# Patient Record
Sex: FEMALE | Race: WHITE | NOT HISPANIC OR LATINO | URBAN - METROPOLITAN AREA
[De-identification: names, ages, dates, MRNs, and addresses within clinical notes are randomized per-mention and may not be internally consistent; named-entity substitution may affect disease eponyms.]

---

## 2018-02-09 ENCOUNTER — CONVERSION ENCOUNTER (OUTPATIENT)
Dept: MAMMOGRAPHY | Facility: HOSPITAL | Age: 63
End: 2018-02-09

## 2018-02-13 ENCOUNTER — CONVERSION ENCOUNTER (OUTPATIENT)
Dept: ULTRASOUND IMAGING | Facility: HOSPITAL | Age: 63
End: 2018-02-13

## 2018-02-22 ENCOUNTER — OFFICE VISIT CONVERTED (OUTPATIENT)
Dept: ONCOLOGY | Facility: HOSPITAL | Age: 63
End: 2018-02-22
Attending: INTERNAL MEDICINE

## 2018-02-22 ENCOUNTER — OFFICE VISIT CONVERTED (OUTPATIENT)
Dept: OTHER | Facility: HOSPITAL | Age: 63
End: 2018-02-22
Attending: SURGERY

## 2018-03-09 ENCOUNTER — OFFICE VISIT CONVERTED (OUTPATIENT)
Dept: ONCOLOGY | Facility: HOSPITAL | Age: 63
End: 2018-03-09
Attending: INTERNAL MEDICINE

## 2018-03-12 ENCOUNTER — OFFICE VISIT CONVERTED (OUTPATIENT)
Dept: UROLOGY | Facility: CLINIC | Age: 63
End: 2018-03-12
Attending: UROLOGY

## 2018-03-12 ENCOUNTER — CONVERSION ENCOUNTER (OUTPATIENT)
Dept: SURGERY | Facility: CLINIC | Age: 63
End: 2018-03-12

## 2018-03-14 ENCOUNTER — OFFICE VISIT CONVERTED (OUTPATIENT)
Dept: ONCOLOGY | Facility: HOSPITAL | Age: 63
End: 2018-03-14
Attending: INTERNAL MEDICINE

## 2018-03-19 ENCOUNTER — OFFICE VISIT CONVERTED (OUTPATIENT)
Dept: SURGERY | Facility: CLINIC | Age: 63
End: 2018-03-19
Attending: SURGERY

## 2018-03-20 ENCOUNTER — OFFICE VISIT CONVERTED (OUTPATIENT)
Dept: ONCOLOGY | Facility: HOSPITAL | Age: 63
End: 2018-03-20
Attending: INTERNAL MEDICINE

## 2018-03-26 ENCOUNTER — TELEPHONE CONVERTED (OUTPATIENT)
Dept: ONCOLOGY | Facility: HOSPITAL | Age: 63
End: 2018-03-26

## 2018-04-03 ENCOUNTER — OFFICE VISIT CONVERTED (OUTPATIENT)
Dept: ONCOLOGY | Facility: HOSPITAL | Age: 63
End: 2018-04-03
Attending: INTERNAL MEDICINE

## 2018-04-10 ENCOUNTER — OFFICE VISIT CONVERTED (OUTPATIENT)
Dept: ONCOLOGY | Facility: HOSPITAL | Age: 63
End: 2018-04-10
Attending: NURSE PRACTITIONER

## 2018-04-13 ENCOUNTER — TELEPHONE CONVERTED (OUTPATIENT)
Dept: ONCOLOGY | Facility: HOSPITAL | Age: 63
End: 2018-04-13

## 2018-04-16 ENCOUNTER — CONVERSION ENCOUNTER (OUTPATIENT)
Dept: SURGERY | Facility: CLINIC | Age: 63
End: 2018-04-16

## 2018-04-16 ENCOUNTER — OFFICE VISIT CONVERTED (OUTPATIENT)
Dept: UROLOGY | Facility: CLINIC | Age: 63
End: 2018-04-16
Attending: UROLOGY

## 2018-04-19 ENCOUNTER — TELEPHONE CONVERTED (OUTPATIENT)
Dept: ONCOLOGY | Facility: HOSPITAL | Age: 63
End: 2018-04-19

## 2018-04-24 ENCOUNTER — OFFICE VISIT CONVERTED (OUTPATIENT)
Dept: ONCOLOGY | Facility: HOSPITAL | Age: 63
End: 2018-04-24
Attending: INTERNAL MEDICINE

## 2018-05-01 ENCOUNTER — OFFICE VISIT CONVERTED (OUTPATIENT)
Dept: ONCOLOGY | Facility: HOSPITAL | Age: 63
End: 2018-05-01
Attending: INTERNAL MEDICINE

## 2018-05-15 ENCOUNTER — OFFICE VISIT CONVERTED (OUTPATIENT)
Dept: ONCOLOGY | Facility: HOSPITAL | Age: 63
End: 2018-05-15
Attending: NURSE PRACTITIONER

## 2018-05-29 ENCOUNTER — TELEPHONE CONVERTED (OUTPATIENT)
Dept: ONCOLOGY | Facility: HOSPITAL | Age: 63
End: 2018-05-29

## 2018-06-07 ENCOUNTER — OFFICE VISIT CONVERTED (OUTPATIENT)
Dept: SURGERY | Facility: CLINIC | Age: 63
End: 2018-06-07
Attending: SURGERY

## 2018-06-22 ENCOUNTER — TELEPHONE CONVERTED (OUTPATIENT)
Dept: ONCOLOGY | Facility: HOSPITAL | Age: 63
End: 2018-06-22

## 2018-07-17 ENCOUNTER — CONVERSION ENCOUNTER (OUTPATIENT)
Dept: PERIOP | Facility: HOSPITAL | Age: 63
End: 2018-07-17

## 2018-07-17 ENCOUNTER — PROCEDURE VISIT CONVERTED (OUTPATIENT)
Dept: OTHER | Facility: HOSPITAL | Age: 63
End: 2018-07-17
Attending: SURGERY

## 2018-07-23 ENCOUNTER — OFFICE VISIT CONVERTED (OUTPATIENT)
Dept: UROLOGY | Facility: CLINIC | Age: 63
End: 2018-07-23
Attending: UROLOGY

## 2018-07-30 ENCOUNTER — OFFICE VISIT CONVERTED (OUTPATIENT)
Dept: SURGERY | Facility: CLINIC | Age: 63
End: 2018-07-30
Attending: SURGERY

## 2018-08-01 ENCOUNTER — OFFICE VISIT CONVERTED (OUTPATIENT)
Dept: ONCOLOGY | Facility: HOSPITAL | Age: 63
End: 2018-08-01
Attending: INTERNAL MEDICINE

## 2018-08-13 ENCOUNTER — OFFICE VISIT CONVERTED (OUTPATIENT)
Dept: SURGERY | Facility: CLINIC | Age: 63
End: 2018-08-13
Attending: SURGERY

## 2018-09-11 ENCOUNTER — OFFICE VISIT CONVERTED (OUTPATIENT)
Dept: ORTHOPEDIC SURGERY | Facility: CLINIC | Age: 63
End: 2018-09-11
Attending: PHYSICIAN ASSISTANT

## 2018-09-13 ENCOUNTER — OFFICE VISIT CONVERTED (OUTPATIENT)
Dept: ONCOLOGY | Facility: HOSPITAL | Age: 63
End: 2018-09-13
Attending: NURSE PRACTITIONER

## 2018-10-02 ENCOUNTER — OFFICE VISIT CONVERTED (OUTPATIENT)
Dept: ORTHOPEDIC SURGERY | Facility: CLINIC | Age: 63
End: 2018-10-02
Attending: PHYSICIAN ASSISTANT

## 2018-10-02 ENCOUNTER — CONVERSION ENCOUNTER (OUTPATIENT)
Dept: ORTHOPEDIC SURGERY | Facility: CLINIC | Age: 63
End: 2018-10-02

## 2018-10-18 ENCOUNTER — OFFICE VISIT CONVERTED (OUTPATIENT)
Dept: ONCOLOGY | Facility: HOSPITAL | Age: 63
End: 2018-10-18
Attending: INTERNAL MEDICINE

## 2018-10-23 ENCOUNTER — OFFICE VISIT CONVERTED (OUTPATIENT)
Dept: ORTHOPEDIC SURGERY | Facility: CLINIC | Age: 63
End: 2018-10-23
Attending: PHYSICIAN ASSISTANT

## 2018-11-07 ENCOUNTER — CONVERSION ENCOUNTER (OUTPATIENT)
Dept: MAMMOGRAPHY | Facility: HOSPITAL | Age: 63
End: 2018-11-07

## 2018-11-21 ENCOUNTER — OFFICE VISIT CONVERTED (OUTPATIENT)
Dept: ONCOLOGY | Facility: HOSPITAL | Age: 63
End: 2018-11-21
Attending: NURSE PRACTITIONER

## 2019-01-03 ENCOUNTER — HOSPITAL ENCOUNTER (OUTPATIENT)
Dept: OTHER | Facility: HOSPITAL | Age: 64
Discharge: HOME | End: 2019-01-03
Attending: INTERNAL MEDICINE

## 2019-01-03 ENCOUNTER — OFFICE VISIT CONVERTED (OUTPATIENT)
Dept: ONCOLOGY | Facility: HOSPITAL | Age: 64
End: 2019-01-03
Attending: INTERNAL MEDICINE

## 2019-01-03 LAB
BASOPHILS # BLD AUTO: 0.01 10*3/UL (ref 0–0.2)
BASOPHILS NFR BLD AUTO: 0.11 % (ref 0–3)
EOSINOPHIL # BLD AUTO: 0.18 10*3/UL (ref 0–0.7)
EOSINOPHIL # BLD AUTO: 2.66 % (ref 0–7)
ERYTHROCYTE [DISTWIDTH] IN BLOOD BY AUTOMATED COUNT: 14.5 % (ref 11.5–14.5)
FERRITIN SERPL-MCNC: 13 NG/ML (ref 10–200)
HBA1C MFR BLD: 11.9 G/DL (ref 12–16)
HCT VFR BLD AUTO: 36.8 % (ref 37–47)
IRON SATN MFR SERPL: 11 % (ref 20–55)
IRON SERPL-MCNC: 46 UG/DL (ref 60–170)
LYMPHOCYTES # BLD AUTO: 0.84 10*3/UL (ref 1–5)
MCH RBC QN AUTO: 26.8 PG (ref 27–31)
MCHC RBC AUTO-ENTMCNC: 32.3 G/DL (ref 33–37)
MCV RBC AUTO: 82.8 FL (ref 81–99)
MONOCYTES # BLD AUTO: 0.72 10*3/UL (ref 0.2–1.2)
MONOCYTES NFR BLD AUTO: 10.6 % (ref 3–10)
NEUTROPHILS # BLD AUTO: 5.01 10*3/UL (ref 2–8)
NEUTROPHILS NFR BLD AUTO: 74.2 % (ref 30–85)
NRBC BLD AUTO-RTO: 0 % (ref 0–0.01)
PLATELET # BLD AUTO: 268 10*3/UL (ref 130–400)
PMV BLD AUTO: 8 FL (ref 7.4–10.4)
RBC # BLD AUTO: 4.45 10*6/UL (ref 4.2–5.4)
TIBC SERPL-MCNC: 416 UG/DL (ref 245–450)
TRANSFERRIN SERPL-MCNC: 291 MG/DL (ref 250–380)
VARIANT LYMPHS NFR BLD MANUAL: 12.5 % (ref 20–45)
WBC # BLD AUTO: 6.75 10*3/UL (ref 4.8–10.8)

## 2019-01-11 ENCOUNTER — HOSPITAL ENCOUNTER (OUTPATIENT)
Dept: RADIATION ONCOLOGY | Facility: HOSPITAL | Age: 64
Discharge: HOME | End: 2019-01-11
Attending: RADIOLOGY

## 2019-01-15 ENCOUNTER — HOSPITAL ENCOUNTER (OUTPATIENT)
Dept: OTHER | Facility: HOSPITAL | Age: 64
Setting detail: RECURRING SERIES
Discharge: HOME | End: 2019-01-31
Attending: INTERNAL MEDICINE

## 2019-02-13 ENCOUNTER — HOSPITAL ENCOUNTER (OUTPATIENT)
Dept: OTHER | Facility: HOSPITAL | Age: 64
Discharge: HOME | End: 2019-02-13
Attending: NURSE PRACTITIONER

## 2019-02-13 ENCOUNTER — OFFICE VISIT CONVERTED (OUTPATIENT)
Dept: ONCOLOGY | Facility: HOSPITAL | Age: 64
End: 2019-02-13
Attending: NURSE PRACTITIONER

## 2019-02-13 LAB
ALBUMIN SERPL-MCNC: 3.8 G/DL (ref 3.5–5)
ALBUMIN/GLOB SERPL: 1.4 {RATIO} (ref 1.4–2.6)
ALP SERPL-CCNC: 60 U/L (ref 43–160)
ALT SERPL-CCNC: 16 U/L (ref 10–40)
ANION GAP SERPL CALC-SCNC: 15 MMOL/L (ref 8–19)
AST SERPL-CCNC: 16 U/L (ref 15–50)
BASOPHILS # BLD AUTO: 0 10*3/UL (ref 0–0.2)
BASOPHILS NFR BLD AUTO: 0.03 % (ref 0–3)
BILIRUB SERPL-MCNC: <0.15 MG/DL (ref 0.2–1.3)
BUN SERPL-MCNC: 17 MG/DL (ref 5–25)
BUN/CREAT SERPL: 20 {RATIO} (ref 6–20)
CALCIUM SERPL-MCNC: 10.8 MG/DL (ref 8.7–10.4)
CHLORIDE SERPL-SCNC: 102 MMOL/L (ref 99–111)
CONV CO2: 26 MMOL/L (ref 22–32)
CONV TOTAL PROTEIN: 6.5 G/DL (ref 6.3–8.2)
CREAT UR-MCNC: 0.87 MG/DL (ref 0.5–0.9)
EOSINOPHIL # BLD AUTO: 0.14 10*3/UL (ref 0–0.7)
EOSINOPHIL # BLD AUTO: 2.26 % (ref 0–7)
ERYTHROCYTE [DISTWIDTH] IN BLOOD BY AUTOMATED COUNT: 15.1 % (ref 11.5–14.5)
EST. AVERAGE GLUCOSE BLD GHB EST-MCNC: 157 MG/DL
FERRITIN SERPL-MCNC: 274 NG/ML (ref 10–200)
GFR SERPLBLD BASED ON 1.73 SQ M-ARVRAT: >60 ML/MIN/{1.73_M2}
GLOBULIN UR ELPH-MCNC: 2.7 G/DL (ref 2–3.5)
GLUCOSE SERPL-MCNC: 119 MG/DL (ref 65–99)
HBA1C MFR BLD: 12.3 G/DL (ref 12–16)
HBA1C MFR BLD: 7.1 % (ref 3.5–5.7)
HCT VFR BLD AUTO: 36.1 % (ref 37–47)
IRON SERPL-MCNC: 81 UG/DL (ref 60–170)
LYMPHOCYTES # BLD AUTO: 0.86 10*3/UL (ref 1–5)
MCH RBC QN AUTO: 28.6 PG (ref 27–31)
MCHC RBC AUTO-ENTMCNC: 34.1 G/DL (ref 33–37)
MCV RBC AUTO: 84 FL (ref 81–99)
MONOCYTES # BLD AUTO: 0.53 10*3/UL (ref 0.2–1.2)
MONOCYTES NFR BLD AUTO: 8.46 % (ref 3–10)
NEUTROPHILS # BLD AUTO: 4.69 10*3/UL (ref 2–8)
NEUTROPHILS NFR BLD AUTO: 75.4 % (ref 30–85)
NRBC BLD AUTO-RTO: 0 % (ref 0–0.01)
OSMOLALITY SERPL CALC.SUM OF ELEC: 289 MOSM/KG (ref 273–304)
PLATELET # BLD AUTO: 265 10*3/UL (ref 130–400)
PMV BLD AUTO: 7.7 FL (ref 7.4–10.4)
POTASSIUM SERPL-SCNC: 4.5 MMOL/L (ref 3.5–5.3)
RBC # BLD AUTO: 4.3 10*6/UL (ref 4.2–5.4)
SODIUM SERPL-SCNC: 138 MMOL/L (ref 135–147)
VARIANT LYMPHS NFR BLD MANUAL: 13.9 % (ref 20–45)
WBC # BLD AUTO: 6.22 10*3/UL (ref 4.8–10.8)

## 2019-03-12 ENCOUNTER — OFFICE VISIT CONVERTED (OUTPATIENT)
Dept: ONCOLOGY | Facility: HOSPITAL | Age: 64
End: 2019-03-12
Attending: INTERNAL MEDICINE

## 2019-03-12 ENCOUNTER — HOSPITAL ENCOUNTER (OUTPATIENT)
Dept: OTHER | Facility: HOSPITAL | Age: 64
Discharge: HOME | End: 2019-03-12
Attending: INTERNAL MEDICINE

## 2019-03-12 LAB
ALBUMIN SERPL-MCNC: 4.1 G/DL (ref 3.5–5)
ALBUMIN/GLOB SERPL: 1.7 {RATIO} (ref 1.4–2.6)
ALP SERPL-CCNC: 53 U/L (ref 43–160)
ALT SERPL-CCNC: 15 U/L (ref 10–40)
ANION GAP SERPL CALC-SCNC: 12 MMOL/L (ref 8–19)
AST SERPL-CCNC: 15 U/L (ref 15–50)
BASOPHILS # BLD AUTO: 0.03 10*3/UL (ref 0–0.2)
BASOPHILS NFR BLD AUTO: 0.5 % (ref 0–3)
BILIRUB SERPL-MCNC: <0.15 MG/DL (ref 0.2–1.3)
BUN SERPL-MCNC: 16 MG/DL (ref 5–25)
BUN/CREAT SERPL: 18 {RATIO} (ref 6–20)
CALCIUM SERPL-MCNC: 9.8 MG/DL (ref 8.7–10.4)
CHLORIDE SERPL-SCNC: 103 MMOL/L (ref 99–111)
CONV ABS IMM GRAN: 0.03 10*3/UL (ref 0–0.2)
CONV CO2: 26 MMOL/L (ref 22–32)
CONV IMMATURE GRAN: 0.5 % (ref 0–1.8)
CONV TOTAL PROTEIN: 6.5 G/DL (ref 6.3–8.2)
CREAT UR-MCNC: 0.88 MG/DL (ref 0.5–0.9)
DEPRECATED RDW RBC AUTO: 50.7 FL (ref 36.4–46.3)
EOSINOPHIL # BLD AUTO: 0.12 10*3/UL (ref 0–0.7)
EOSINOPHIL # BLD AUTO: 2 % (ref 0–7)
ERYTHROCYTE [DISTWIDTH] IN BLOOD BY AUTOMATED COUNT: 15.5 % (ref 11.7–14.4)
FERRITIN SERPL-MCNC: 206 NG/ML (ref 10–200)
GFR SERPLBLD BASED ON 1.73 SQ M-ARVRAT: >60 ML/MIN/{1.73_M2}
GLOBULIN UR ELPH-MCNC: 2.4 G/DL (ref 2–3.5)
GLUCOSE SERPL-MCNC: 190 MG/DL (ref 65–99)
HBA1C MFR BLD: 12 G/DL (ref 12–16)
HCT VFR BLD AUTO: 37.9 % (ref 37–47)
IRON SERPL-MCNC: 74 UG/DL (ref 60–170)
LYMPHOCYTES # BLD AUTO: 0.75 10*3/UL (ref 1–5)
MCH RBC QN AUTO: 28.1 PG (ref 27–31)
MCHC RBC AUTO-ENTMCNC: 31.7 G/DL (ref 33–37)
MCV RBC AUTO: 88.8 FL (ref 81–99)
MONOCYTES # BLD AUTO: 0.58 10*3/UL (ref 0.2–1.2)
MONOCYTES NFR BLD AUTO: 9.6 % (ref 3–10)
NEUTROPHILS # BLD AUTO: 4.53 10*3/UL (ref 2–8)
NEUTROPHILS NFR BLD AUTO: 75 % (ref 30–85)
NRBC CBCN: 0 % (ref 0–0.7)
OSMOLALITY SERPL CALC.SUM OF ELEC: 290 MOSM/KG (ref 273–304)
PLATELET # BLD AUTO: 256 10*3/UL (ref 130–400)
PMV BLD AUTO: 10.6 FL (ref 9.4–12.3)
POTASSIUM SERPL-SCNC: 4.1 MMOL/L (ref 3.5–5.3)
RBC # BLD AUTO: 4.27 10*6/UL (ref 4.2–5.4)
SODIUM SERPL-SCNC: 137 MMOL/L (ref 135–147)
VARIANT LYMPHS NFR BLD MANUAL: 12.4 % (ref 20–45)
WBC # BLD AUTO: 6.04 10*3/UL (ref 4.8–10.8)

## 2019-06-12 ENCOUNTER — OFFICE VISIT CONVERTED (OUTPATIENT)
Dept: ONCOLOGY | Facility: HOSPITAL | Age: 64
End: 2019-06-12
Attending: INTERNAL MEDICINE

## 2019-06-12 ENCOUNTER — HOSPITAL ENCOUNTER (OUTPATIENT)
Dept: RADIATION ONCOLOGY | Facility: HOSPITAL | Age: 64
Discharge: HOME | End: 2019-06-12
Attending: RADIOLOGY

## 2019-06-12 ENCOUNTER — HOSPITAL ENCOUNTER (OUTPATIENT)
Dept: OTHER | Facility: HOSPITAL | Age: 64
Discharge: HOME | End: 2019-06-12
Attending: INTERNAL MEDICINE

## 2019-06-20 ENCOUNTER — HOSPITAL ENCOUNTER (OUTPATIENT)
Dept: MAMMOGRAPHY | Facility: HOSPITAL | Age: 64
Discharge: HOME | End: 2019-06-20
Attending: RADIOLOGY

## 2019-06-24 ENCOUNTER — OFFICE VISIT CONVERTED (OUTPATIENT)
Dept: FAMILY MEDICINE CLINIC | Facility: CLINIC | Age: 64
End: 2019-06-24
Attending: NURSE PRACTITIONER

## 2019-06-26 ENCOUNTER — HOSPITAL ENCOUNTER (OUTPATIENT)
Dept: RADIATION ONCOLOGY | Facility: HOSPITAL | Age: 64
Discharge: HOME | End: 2019-06-26
Attending: RADIOLOGY

## 2019-08-31 ENCOUNTER — HOSPITAL ENCOUNTER (OUTPATIENT)
Dept: URGENT CARE | Facility: CLINIC | Age: 64
Discharge: HOME | End: 2019-08-31
Attending: FAMILY MEDICINE

## 2019-12-06 ENCOUNTER — HOSPITAL ENCOUNTER (OUTPATIENT)
Dept: URGENT CARE | Facility: CLINIC | Age: 64
Discharge: HOME | End: 2019-12-06

## 2019-12-09 ENCOUNTER — OFFICE VISIT CONVERTED (OUTPATIENT)
Dept: FAMILY MEDICINE CLINIC | Facility: CLINIC | Age: 64
End: 2019-12-09
Attending: NURSE PRACTITIONER

## 2019-12-30 ENCOUNTER — HOSPITAL ENCOUNTER (OUTPATIENT)
Dept: URGENT CARE | Facility: CLINIC | Age: 64
Discharge: HOME | End: 2019-12-30
Attending: EMERGENCY MEDICINE

## 2020-01-01 LAB — BACTERIA SPEC AEROBE CULT: NORMAL

## 2020-01-08 ENCOUNTER — HOSPITAL ENCOUNTER (OUTPATIENT)
Dept: RADIATION ONCOLOGY | Facility: HOSPITAL | Age: 65
Discharge: HOME | End: 2020-01-08
Attending: RADIOLOGY

## 2020-02-10 ENCOUNTER — OFFICE VISIT CONVERTED (OUTPATIENT)
Dept: FAMILY MEDICINE CLINIC | Facility: CLINIC | Age: 65
End: 2020-02-10
Attending: NURSE PRACTITIONER

## 2020-02-10 ENCOUNTER — HOSPITAL ENCOUNTER (OUTPATIENT)
Dept: GENERAL RADIOLOGY | Facility: HOSPITAL | Age: 65
Discharge: HOME | End: 2020-02-10
Attending: NURSE PRACTITIONER

## 2020-02-10 LAB
25(OH)D3 SERPL-MCNC: 54.2 NG/ML (ref 30–100)
ALBUMIN SERPL-MCNC: 4.3 G/DL (ref 3.5–5)
ALBUMIN/GLOB SERPL: 1.5 {RATIO} (ref 1.4–2.6)
ALP SERPL-CCNC: 63 U/L (ref 43–160)
ALT SERPL-CCNC: 28 U/L (ref 10–40)
ANION GAP SERPL CALC-SCNC: 20 MMOL/L (ref 8–19)
AST SERPL-CCNC: 23 U/L (ref 15–50)
BASOPHILS # BLD AUTO: 0.05 10*3/UL (ref 0–0.2)
BASOPHILS NFR BLD AUTO: 0.5 % (ref 0–3)
BILIRUB SERPL-MCNC: 0.25 MG/DL (ref 0.2–1.3)
BUN SERPL-MCNC: 16 MG/DL (ref 5–25)
BUN/CREAT SERPL: 16 {RATIO} (ref 6–20)
CALCIUM SERPL-MCNC: 10.4 MG/DL (ref 8.7–10.4)
CHLORIDE SERPL-SCNC: 100 MMOL/L (ref 99–111)
CHOLEST SERPL-MCNC: 184 MG/DL (ref 107–200)
CHOLEST/HDLC SERPL: 3.1 {RATIO} (ref 3–6)
CONV ABS IMM GRAN: 0.06 10*3/UL (ref 0–0.2)
CONV CO2: 24 MMOL/L (ref 22–32)
CONV CREATININE URINE, RANDOM: 38.4 MG/DL (ref 10–300)
CONV IMMATURE GRAN: 0.7 % (ref 0–1.8)
CONV MICROALBUM.,U,RANDOM: <12 MG/L (ref 0–20)
CONV TOTAL PROTEIN: 7.1 G/DL (ref 6.3–8.2)
CREAT UR-MCNC: 0.99 MG/DL (ref 0.5–0.9)
DEPRECATED RDW RBC AUTO: 43.3 FL (ref 36.4–46.3)
EOSINOPHIL # BLD AUTO: 0.16 10*3/UL (ref 0–0.7)
EOSINOPHIL # BLD AUTO: 1.7 % (ref 0–7)
ERYTHROCYTE [DISTWIDTH] IN BLOOD BY AUTOMATED COUNT: 13.4 % (ref 11.7–14.4)
EST. AVERAGE GLUCOSE BLD GHB EST-MCNC: 163 MG/DL
FOLATE SERPL-MCNC: >20 NG/ML (ref 4.8–20)
GFR SERPLBLD BASED ON 1.73 SQ M-ARVRAT: 60 ML/MIN/{1.73_M2}
GLOBULIN UR ELPH-MCNC: 2.8 G/DL (ref 2–3.5)
GLUCOSE SERPL-MCNC: 113 MG/DL (ref 65–99)
HBA1C MFR BLD: 7.3 % (ref 3.5–5.7)
HCT VFR BLD AUTO: 42.6 % (ref 37–47)
HDLC SERPL-MCNC: 59 MG/DL (ref 40–60)
HGB BLD-MCNC: 13.7 G/DL (ref 12–16)
IRON SATN MFR SERPL: 18 % (ref 20–55)
IRON SERPL-MCNC: 66 UG/DL (ref 60–170)
LDLC SERPL CALC-MCNC: 96 MG/DL (ref 70–100)
LYMPHOCYTES # BLD AUTO: 1.39 10*3/UL (ref 1–5)
LYMPHOCYTES NFR BLD AUTO: 15.1 % (ref 20–45)
MCH RBC QN AUTO: 28.2 PG (ref 27–31)
MCHC RBC AUTO-ENTMCNC: 32.2 G/DL (ref 33–37)
MCV RBC AUTO: 87.8 FL (ref 81–99)
MICROALBUMIN/CREAT UR: 31.2 MG/G{CRE} (ref 0–35)
MONOCYTES # BLD AUTO: 0.84 10*3/UL (ref 0.2–1.2)
MONOCYTES NFR BLD AUTO: 9.2 % (ref 3–10)
NEUTROPHILS # BLD AUTO: 6.68 10*3/UL (ref 2–8)
NEUTROPHILS NFR BLD AUTO: 72.8 % (ref 30–85)
NRBC CBCN: 0 % (ref 0–0.7)
OSMOLALITY SERPL CALC.SUM OF ELEC: 290 MOSM/KG (ref 273–304)
PLATELET # BLD AUTO: 326 10*3/UL (ref 130–400)
PMV BLD AUTO: 10.5 FL (ref 9.4–12.3)
POTASSIUM SERPL-SCNC: 4.5 MMOL/L (ref 3.5–5.3)
RBC # BLD AUTO: 4.85 10*6/UL (ref 4.2–5.4)
SODIUM SERPL-SCNC: 139 MMOL/L (ref 135–147)
T4 FREE SERPL-MCNC: 1.2 NG/DL (ref 0.9–1.8)
TIBC SERPL-MCNC: 358 UG/DL (ref 245–450)
TRANSFERRIN SERPL-MCNC: 250 MG/DL (ref 250–380)
TRIGL SERPL-MCNC: 147 MG/DL (ref 40–150)
TSH SERPL-ACNC: 1.35 M[IU]/L (ref 0.27–4.2)
VIT B12 SERPL-MCNC: 663 PG/ML (ref 211–911)
VLDLC SERPL-MCNC: 29 MG/DL (ref 5–37)
WBC # BLD AUTO: 9.18 10*3/UL (ref 4.8–10.8)

## 2020-02-28 ENCOUNTER — HOSPITAL ENCOUNTER (OUTPATIENT)
Dept: GENERAL RADIOLOGY | Facility: HOSPITAL | Age: 65
Discharge: HOME | End: 2020-02-28
Attending: NURSE PRACTITIONER

## 2020-02-28 ENCOUNTER — OFFICE VISIT CONVERTED (OUTPATIENT)
Dept: FAMILY MEDICINE CLINIC | Facility: CLINIC | Age: 65
End: 2020-02-28
Attending: NURSE PRACTITIONER

## 2020-03-04 ENCOUNTER — HOSPITAL ENCOUNTER (OUTPATIENT)
Dept: CT IMAGING | Facility: HOSPITAL | Age: 65
Discharge: HOME | End: 2020-03-04
Attending: NURSE PRACTITIONER

## 2020-07-07 ENCOUNTER — OFFICE VISIT CONVERTED (OUTPATIENT)
Dept: FAMILY MEDICINE CLINIC | Facility: CLINIC | Age: 65
End: 2020-07-07
Attending: NURSE PRACTITIONER

## 2020-08-21 ENCOUNTER — CONVERSION ENCOUNTER (OUTPATIENT)
Dept: FAMILY MEDICINE CLINIC | Facility: CLINIC | Age: 65
End: 2020-08-21

## 2020-08-21 ENCOUNTER — OFFICE VISIT CONVERTED (OUTPATIENT)
Dept: FAMILY MEDICINE CLINIC | Facility: CLINIC | Age: 65
End: 2020-08-21
Attending: NURSE PRACTITIONER

## 2020-08-26 ENCOUNTER — TELEPHONE CONVERTED (OUTPATIENT)
Dept: FAMILY MEDICINE CLINIC | Facility: CLINIC | Age: 65
End: 2020-08-26
Attending: NURSE PRACTITIONER

## 2020-10-07 ENCOUNTER — TELEPHONE CONVERTED (OUTPATIENT)
Dept: FAMILY MEDICINE CLINIC | Facility: CLINIC | Age: 65
End: 2020-10-07
Attending: NURSE PRACTITIONER

## 2020-11-03 ENCOUNTER — HOSPITAL ENCOUNTER (OUTPATIENT)
Dept: GENERAL RADIOLOGY | Facility: HOSPITAL | Age: 65
Discharge: HOME | End: 2020-11-03
Attending: NURSE PRACTITIONER

## 2020-11-03 ENCOUNTER — OFFICE VISIT CONVERTED (OUTPATIENT)
Dept: FAMILY MEDICINE CLINIC | Facility: CLINIC | Age: 65
End: 2020-11-03
Attending: NURSE PRACTITIONER

## 2020-11-03 ENCOUNTER — CONVERSION ENCOUNTER (OUTPATIENT)
Dept: FAMILY MEDICINE CLINIC | Facility: CLINIC | Age: 65
End: 2020-11-03

## 2021-01-22 ENCOUNTER — CONVERSION ENCOUNTER (OUTPATIENT)
Dept: FAMILY MEDICINE CLINIC | Facility: CLINIC | Age: 66
End: 2021-01-22

## 2021-01-22 ENCOUNTER — TELEPHONE CONVERTED (OUTPATIENT)
Dept: FAMILY MEDICINE CLINIC | Facility: CLINIC | Age: 66
End: 2021-01-22
Attending: FAMILY MEDICINE

## 2021-02-04 ENCOUNTER — HOSPITAL ENCOUNTER (OUTPATIENT)
Dept: OTHER | Facility: HOSPITAL | Age: 66
Discharge: HOME | End: 2021-02-04
Attending: INTERNAL MEDICINE

## 2021-02-04 ENCOUNTER — OFFICE VISIT CONVERTED (OUTPATIENT)
Dept: ONCOLOGY | Facility: HOSPITAL | Age: 66
End: 2021-02-04
Attending: INTERNAL MEDICINE

## 2021-02-04 LAB
ALBUMIN SERPL-MCNC: 3.3 G/DL (ref 3.5–5)
ALBUMIN/GLOB SERPL: 1.3 {RATIO} (ref 1.4–2.6)
ALP SERPL-CCNC: 602 U/L (ref 43–160)
ALT SERPL-CCNC: 144 U/L (ref 10–40)
ANION GAP SERPL CALC-SCNC: 12 MMOL/L (ref 8–19)
AST SERPL-CCNC: 186 U/L (ref 15–50)
BASOPHILS # BLD AUTO: 0.03 10*3/UL (ref 0–0.2)
BASOPHILS NFR BLD AUTO: 0.4 % (ref 0–3)
BILIRUB SERPL-MCNC: 6.64 MG/DL (ref 0.2–1.3)
BUN SERPL-MCNC: 10 MG/DL (ref 5–25)
BUN/CREAT SERPL: 14 {RATIO} (ref 6–20)
CALCIUM SERPL-MCNC: 10.6 MG/DL (ref 8.7–10.4)
CHLORIDE SERPL-SCNC: 100 MMOL/L (ref 99–111)
CONV ABS IMM GRAN: 0.01 10*3/UL (ref 0–0.54)
CONV CO2: 22 MMOL/L (ref 22–32)
CONV EOSINOPHILS PERCENT BY MANUAL COUNT: 1 % (ref 0–7)
CONV IMMATURE GRAN: 0.1 % (ref 0–0.4)
CONV TOTAL PROTEIN: 5.9 G/DL (ref 6.3–8.2)
CREAT UR-MCNC: 0.74 MG/DL (ref 0.5–0.9)
EOSINOPHIL # BLD MANUAL: 0.07 10*3/UL (ref 0–0.7)
ERYTHROCYTE [DISTWIDTH] IN BLOOD BY AUTOMATED COUNT: 17.7 % (ref 11.5–14.5)
ERYTHROCYTE [DISTWIDTH] IN BLOOD BY AUTOMATED COUNT: 55.6 FL
GFR SERPLBLD BASED ON 1.73 SQ M-ARVRAT: >60 ML/MIN/{1.73_M2}
GLOBULIN UR ELPH-MCNC: 2.6 G/DL (ref 2–3.5)
GLUCOSE SERPL-MCNC: 262 MG/DL (ref 65–99)
HBA1C MFR BLD: 12.4 G/DL (ref 12–16)
HCT VFR BLD AUTO: 37.8 % (ref 37–47)
LYMPHOCYTES # BLD AUTO: 0.75 10*3/UL (ref 1–5)
LYMPHOCYTES NFR BLD AUTO: 11.1 % (ref 20–45)
MCH RBC QN AUTO: 28.6 PG (ref 27–31)
MCHC RBC AUTO-ENTMCNC: 32.8 G/DL (ref 33–37)
MCV RBC AUTO: 87.3 FL (ref 81–99)
MONOCYTES # BLD AUTO: 0.7 10*3/UL (ref 0.2–1.2)
MONOCYTES NFR BLD MANUAL: 10.4 % (ref 3–10)
NEUTROPHILS # BLD AUTO: 5.2 10*3/UL (ref 2–8)
NEUTROPHILS NFR BLD MANUAL: 77 % (ref 30–85)
OSMOLALITY SERPL CALC.SUM OF ELEC: 278 MOSM/KG (ref 273–304)
PLATELET # BLD AUTO: 206 10*3/UL (ref 130–400)
PMV BLD AUTO: 11.8 FL (ref 7.4–10.4)
POTASSIUM SERPL-SCNC: 4.2 MMOL/L (ref 3.5–5.3)
RBC MORPH BLD: 4.33 10*6/UL (ref 4.2–5.4)
SODIUM SERPL-SCNC: 130 MMOL/L (ref 135–147)
WBC # BLD AUTO: 6.76 10*3/UL (ref 4.8–10.8)

## 2021-03-01 ENCOUNTER — CONVERSION ENCOUNTER (OUTPATIENT)
Dept: FAMILY MEDICINE CLINIC | Facility: CLINIC | Age: 66
End: 2021-03-01

## 2021-03-01 ENCOUNTER — OFFICE VISIT CONVERTED (OUTPATIENT)
Dept: FAMILY MEDICINE CLINIC | Facility: CLINIC | Age: 66
End: 2021-03-01
Attending: NURSE PRACTITIONER

## 2021-03-08 ENCOUNTER — HOSPITAL ENCOUNTER (OUTPATIENT)
Dept: PET IMAGING | Facility: HOSPITAL | Age: 66
Discharge: HOME | End: 2021-03-08
Attending: INTERNAL MEDICINE

## 2021-03-10 ENCOUNTER — OFFICE VISIT CONVERTED (OUTPATIENT)
Dept: ONCOLOGY | Facility: HOSPITAL | Age: 66
End: 2021-03-10
Attending: INTERNAL MEDICINE

## 2021-03-10 ENCOUNTER — HOSPITAL ENCOUNTER (OUTPATIENT)
Dept: OTHER | Facility: HOSPITAL | Age: 66
Discharge: HOME | End: 2021-03-10
Attending: INTERNAL MEDICINE

## 2021-03-10 LAB
ALBUMIN SERPL-MCNC: 3.4 G/DL (ref 3.5–5)
ALBUMIN/GLOB SERPL: 1.3 {RATIO} (ref 1.4–2.6)
ALP SERPL-CCNC: 392 U/L (ref 43–160)
ALT SERPL-CCNC: 69 U/L (ref 10–40)
ANION GAP SERPL CALC-SCNC: 18 MMOL/L (ref 8–19)
AST SERPL-CCNC: 161 U/L (ref 15–50)
BASOPHILS # BLD AUTO: 0.04 10*3/UL (ref 0–0.2)
BASOPHILS NFR BLD AUTO: 0.4 % (ref 0–3)
BILIRUB SERPL-MCNC: 24.78 MG/DL (ref 0.2–1.3)
BUN SERPL-MCNC: 44 MG/DL (ref 5–25)
BUN/CREAT SERPL: 31 {RATIO} (ref 6–20)
CALCIUM SERPL-MCNC: 11.5 MG/DL (ref 8.7–10.4)
CHLORIDE SERPL-SCNC: 92 MMOL/L (ref 99–111)
CONV ABS IMM GRAN: 0.02 10*3/UL (ref 0–0.54)
CONV CO2: 21 MMOL/L (ref 22–32)
CONV EOSINOPHILS PERCENT BY MANUAL COUNT: 0.7 % (ref 0–7)
CONV IMMATURE GRAN: 0.2 % (ref 0–0.4)
CONV TOTAL PROTEIN: 6 G/DL (ref 6.3–8.2)
CREAT UR-MCNC: 1.41 MG/DL (ref 0.5–0.9)
EOSINOPHIL # BLD MANUAL: 0.07 10*3/UL (ref 0–0.7)
ERYTHROCYTE [DISTWIDTH] IN BLOOD BY AUTOMATED COUNT: 22.4 % (ref 11.5–14.5)
ERYTHROCYTE [DISTWIDTH] IN BLOOD BY AUTOMATED COUNT: 72.1 FL
GFR SERPLBLD BASED ON 1.73 SQ M-ARVRAT: 39 ML/MIN/{1.73_M2}
GLOBULIN UR ELPH-MCNC: 2.6 G/DL (ref 2–3.5)
GLUCOSE SERPL-MCNC: 214 MG/DL (ref 65–99)
HBA1C MFR BLD: 13.2 G/DL (ref 12–16)
HCT VFR BLD AUTO: 37.7 % (ref 37–47)
LYMPHOCYTES # BLD AUTO: 0.9 10*3/UL (ref 1–5)
LYMPHOCYTES NFR BLD AUTO: 9.2 % (ref 20–45)
MCH RBC QN AUTO: 30.7 PG (ref 27–31)
MCHC RBC AUTO-ENTMCNC: 35 G/DL (ref 33–37)
MCV RBC AUTO: 87.7 FL (ref 81–99)
MONOCYTES # BLD AUTO: 1.05 10*3/UL (ref 0.2–1.2)
MONOCYTES NFR BLD MANUAL: 10.8 % (ref 3–10)
NEUTROPHILS # BLD AUTO: 7.67 10*3/UL (ref 2–8)
NEUTROPHILS NFR BLD MANUAL: 78.7 % (ref 30–85)
OSMOLALITY SERPL CALC.SUM OF ELEC: 280 MOSM/KG (ref 273–304)
PLATELET # BLD AUTO: 273 10*3/UL (ref 130–400)
PMV BLD AUTO: 11.1 FL (ref 7.4–10.4)
POTASSIUM SERPL-SCNC: 4.6 MMOL/L (ref 3.5–5.3)
RBC MORPH BLD: 4.3 10*6/UL (ref 4.2–5.4)
SODIUM SERPL-SCNC: 126 MMOL/L (ref 135–147)
WBC # BLD AUTO: 9.75 10*3/UL (ref 4.8–10.8)

## 2021-05-13 NOTE — PROGRESS NOTES
"   Progress Note      Patient Name: Monica Patel   Patient ID: [id number]   Sex: Female   YOB: 1955    Primary Care Provider: Regina QUIROZ   Referring Provider: Regina QUIROZ    Visit Date: July 7, 2020    Provider: RICHIE Corral   Location: [facility]   Location Address: [address] [place] [address] [address] [place] [address]  [id number]   Location Phone: [phone]          Chief Complaint  · Middle finger joint pain bilateral.  · Lab work for diabetes.      History Of Present Illness  Monica Patel is a 65 year old /White female who presents for evaluation and treatment of:      Pt c/o bilat middle finger pain x 2 months. Pt has been sewing masks and noticed finger swelling  since beginning sewing work. Denies any injury. Takes tyl for relief.     Pt wishes to DC insulin and start Januvia if possible. States when she took Januvia in the past she felt better and had \"less brain fog\". Pt takes BS reg at home. Last A1c 2/10/20 7.3%.            Past Medical History  Disease Name Date Onset Notes   Allergic rhinitis --  --    Bladder Disorder --  --    Breast cancer in female --  --    Diabetes --  --    Diabetes, unspecified --  --    Interstitial cystitis --  --    Oncocytoma --  --    Overactiver Bladder --  --    Renal mass, right --  --    Thyroid disorder --  --          Past Surgical History  Procedure Name Date Notes   Breast biopsy --  --    Hand surgery, right --  --    Hysterectomy --  --    Lumpectomy of right breast --  --    Metal implants --  --    Robotic-assisted partial right nephrectomy 7-20-18 --          Medication List  Name Date Started Instructions   Armidex 1 mg oral  take one tab once daily   Benadryl 25 mg oral capsule  --    biotin oral  --    citalopram 40 mg oral tablet 05/04/2020 TAKE ONE TABLET BY MOUTH DAILY   ferrous sulfate 325 mg (65 mg iron) oral tablet 02/11/2020 take 1 tablet (325 mg) by oral route 2 times per day for " 30 days   glipizide 5 mg oral tablet 03/11/2020 TAKE THREE TABLETS BY MOUTH TWICE A DAY   Humulin 70/30 U-100 Insulin 100 unit/mL (70-30) subcutaneous suspension  inject by subcutaneous route as per insulin protocol   levothyroxine 50 mcg oral tablet 01/17/2020 take 1 tablet (50 mcg) by oral route once daily for 90 days   lisinopril 5 mg oral tablet 05/04/2020 TAKE ONE TABLET BY MOUTH DAILY   magnesium oral  --    metformin 1,000 mg oral tablet 12/18/2019 take 1 tablet (1,000 mg) by oral route 2 times per day with morning and evening meals for 30 days   potassium 99 mg oral tablet  --    relion  70/30 take 20 units twice daily   Tylenol 325 mg oral capsule  --    Vitamin c  take one tab 500mg once daily   Vitamin D3 oral  --    vitamin E oral  --          Allergy List  Allergen Name Date Reaction Notes   erythromycin --  --  --        Allergies Reconciled  Family Medical History  Disease Name Relative/Age Notes   Heart Disease Father/  Mother/   Father; Mother   Cancer, Unspecified Mother/   Mother   Diabetes, unspecified type Father/  Mother/  Son/   Mother; Father; Son   Hypertension  --    Leukemia  --    Family history of certain chronic disabling diseases; arthritis Father/  Mother/   Mother; Father         Social History  Finding Status Start/Stop Quantity Notes   Alcohol Use Never --/-- --  does not drink   lives with spouse --  --/-- --  --    . --  --/-- --  --    Recreational Drug Use Never --/-- --  no   Retired. --  --/-- --  --    Tobacco Never --/-- --  never smoker         Immunizations  NameDate Admin Mfg Trade Name Lot Number Route Inj VIS Given VIS Publication   InfluenzaRefused 06/24/2019 NE Not Entered  NE NE     Comments: Doesn't ever get vaccine         Review of Systems  · Constitutional  o Denies  o : fever, fatigue, weight loss, weight gain  · HENT  o Denies  o : headaches, sore throat  · Cardiovascular  o Denies  o : lower extremity edema, claudication, chest pressure,  "palpitations  · Respiratory  o Denies  o : shortness of breath, wheezing, cough, hemoptysis, dyspnea on exertion  · Gastrointestinal  o Denies  o : nausea, vomiting, diarrhea, constipation, abdominal pain  · Musculoskeletal  o Admits  o : joint pain, joint swelling, bilat hand pain  · Psychiatric  o Denies  o : anxiety, depression, suicidal ideation, homicidal ideation      Vitals  Date Time BP Position Site L\R Cuff Size HR RR TEMP (F) WT  HT  BMI kg/m2 BSA m2 O2 Sat HC       07/07/2020 01:16 /72 Sitting    80 - R 20 98.8 215lbs 2oz 5'  2\" 39.35 2.07 97 %          Physical Examination  · Constitutional  o Appearance  o : no acute distress, well-nourished  · Head and Face  o Head  o :   § Inspection  § : atraumatic, normocephalic  o Face  o :   § Inspection  § : no facial lesions  · Eyes  o Eyes  o : extraocular movements intact, no scleral icterus, no conjunctival injection  · Ears, Nose, Mouth and Throat  o Ears  o :   § External Ears  § : normal  o Nose  o :   § Intranasal Exam  § : nares patent  o Oral Cavity  o :   § Oral Mucosa  § : moist mucous membranes  · Neck  o Thyroid  o : gland size normal, nontender, no nodules or masses present on palpation, symmetric  · Respiratory  o Respiratory Effort  o : breathing comfortably, symmetric chest rise  o Auscultation of Lungs  o : clear to asculatation bilaterally, no wheezes, rales, or rhonchii  · Cardiovascular  o Heart  o :   § Auscultation of Heart  § : regular rate and rhythm, no murmurs, rubs, or gallops  o Peripheral Vascular System  o :   § Extremities  § : no edema  · Lymphatic  o Neck  o : no lymphadenopathy present  o Supraclavicular Nodes  o : no supraclavicular nodes  · Skin and Subcutaneous Tissue  o General Inspection  o : no lesions present, no areas of discoloration, skin turgor normal  · Neurologic  o Mental Status Examination  o :   § Orientation  § : grossly oriented to person, place and time  o Gait and Station  o :   § Gait Screening  § : " normal gait  · Psychiatric  o General  o : normal mood and affect  · Right Hand  o Inspection  o : no redness, no abrasions, no lacerations, mild knuckle swelling present  o Palpation  o : tender to palpation   o Range of Motion  o : PIP 0-90, DIP 0-90, MCP 0-90  · Left Hand  o Inspection  o : capillary refill less than 5 seconds in all five nailbeds  o Palpation  o : no tenderness to palpation  o Range of Motion  o : PIP 0-90, DIP 0-90, MCP 0-90              Assessment  · Diabetes mellitus, type 2     250.00/E11.9  · Osteoarthritis     715.90/M19.90  · Hand pain     729.5/M79.643       bilat middle finger pain/OA:  Voltaren gel qid  Pt is going to check on medication interaction with Arimidex; she believes she was told not to take NSAIDs while on medication, she is is going to double-check and let us know.  If able, will start on meloxicam 15mg PO qd  Continue tyl UAD prn  Increase rest  Ice 20 min tid PRN  Consider imaging if S/S worsen or persist    T2DM:  Controlled  Start Januvia 100mg PO qd  Monitor closely to DC Humulin  Continue meformin and glipizide as prescribed  T2DM diet  Take blood glucose reg, keep log, bring to next appt  FU 1 month     Problems Reconciled  Plan  · Orders  o ACO-39: Current medications updated and reviewed () - - 07/07/2020  o ACO-14: Influenza immunization was not administered for reasons documented () - - 07/07/2020   Declined  o ACO-13: Fall Risk Screening with no falls in past year or only one fall without injury in the past year (1101F) - - 07/07/2020   No falls.  o ACO - Pt declines to or was not able to provide an Advance Care Plan or name a Surrogate Decision Maker (1124F) - - 07/07/2020   Declined for now.  · Medications  o Voltaren 1 % topical gel   SIG: apply 2 grams to the affected area(s) by topical route 4 times per day   DISP: (1) 100 gm tube with 2 refills  Prescribed on 07/07/2020     o Januvia 100 mg oral tablet   SIG: take 1 tablet (100 mg) by oral route  once daily for 30 days   DISP: (30) tablets with 5 refills  Prescribed on 07/07/2020     o Medications have been Reconciled  o Transition of Care or Provider Policy  · Instructions  o Continue blood sugar monitoring daily and record. Bring your log to office visits. Call the office for readings below 70 and above 250 or any complications.  o Daily foot care. Avoid walking barefoot. Annual Dilated Eye Exam.  o Discussed with patient blood pressure monitoring, hemoglobin A1C levels need to be below 7.0, and LDL (Lipid) goals below 70.  o Tylenol may be used as needed every 4-6 hours for pain.  o Take all medications as prescribed/directed.  o Patient was educated/instructed on their diagnosis, treatment and medications prior to discharge from the clinic today.  o Patient was instructed to exercise regularly.  o Patient instructed to seek medical attention urgently for new or worsening symptoms.  o Call the office with any concerns or questions.  o Bring all medicines with their bottles to each office visit.  o Risks, benefits, and alternatives were discussed with the patient. The patient is aware of risks associated with: all meds and conditions.   o Chronic conditions reviewed and taken into consideration for today's treatment plan.  o Flu vaccine declined.  o Discussed Covid-19 precautions including, but not limited to, social distancing, avoid touching your face, and hand washing.   · Disposition  o Call or Return if symptoms worsen or persist.  o Follow Up PRN.  o Follow Up in 1 month.  o Proceed to ED for all medical emergencies.            Electronically Signed by: RICHIE Corral -Author on July 7, 2020 01:55:16 PM

## 2021-05-13 NOTE — PROGRESS NOTES
Progress Note      Patient Name: Monica Patel   Patient ID: [id number]   Sex: Female   YOB: 1955    Primary Care Provider: Regina QUIROZ   Referring Provider: Regina QUIROZ    Visit Date: November 3, 2020    Provider: RICHIE Corral   Location: [facility]   Location Address: [address] [place] [address]  [id number]   Location Phone: [phone]          Chief Complaint  · Neck pain x3 weeks.      History Of Present Illness  Monica Patel is a 65 year old /White female who presents for evaluation and treatment of:      She is having pain starting mid back, the pain radiates upward and into her right neck. The pain has been going on for 3 weeks, but has became unbearable Sunday this week. She has taken 2 Tylenols and alternating with Motrin and this does not help. She has also applied biofreeze which she says help minimally. She has also used a heating pad a this helps somewhat. The pain is worse in the evening. She denies stiffness. Pain is worsened with movement. She denies any recent injury. She feels worried over the COVID 19 virus.       Past Medical History  Disease Name Date Onset Notes   Allergic rhinitis --  --    Bladder Disorder --  --    Breast cancer in female --  --    Diabetes --  --    Diabetes, unspecified --  --    Interstitial cystitis --  --    Oncocytoma --  --    Overactiver Bladder --  --    Renal mass, right --  --    Thyroid disorder --  --          Past Surgical History  Procedure Name Date Notes   Breast biopsy --  --    Hand surgery, right --  --    Hysterectomy --  --    Lumpectomy of right breast --  --    Metal implants --  --    Robotic-assisted partial right nephrectomy 7-20-18 --          Medication List  Name Date Started Instructions   Armidex 1 mg oral  take one tab once daily   atorvastatin 10 mg oral tablet 12/01/2020 take 1 tablet (10 mg) by oral route once daily at bedtime for 30 days   Benadryl 25  mg oral capsule  --    biotin oral  --    citalopram 40 mg oral tablet 07/31/2020 TAKE ONE TABLET BY MOUTH DAILY   DICLOFENAC SODIUM 1% GEL 11/03/2020 APPLY TWO GRAMS TO THE AFFECTED AREA(S) FOUR TIMES A DAY   ferrous sulfate 325 mg (65 mg iron) oral tablet 02/11/2020 take 1 tablet (325 mg) by oral route 2 times per day for 30 days   glipizide 5 mg oral tablet 12/02/2020 TAKE THREE TABLETS BY MOUTH TWICE A DAY   Humulin 70/30 U-100 Insulin 100 unit/mL (70-30) subcutaneous suspension  inject by subcutaneous route as per insulin protocol   Januvia 100 mg oral tablet 07/07/2020 take 1 tablet (100 mg) by oral route once daily for 30 days   levothyroxine 50 mcg oral tablet 10/12/2020 TAKE ONE TABLET BY MOUTH DAILY   lisinopril 5 mg oral tablet 07/31/2020 TAKE ONE TABLET BY MOUTH DAILY   magnesium oral  --    meloxicam 15 mg oral tablet 11/03/2020 take 1 tablet (15 mg) by oral route once daily for 30 days   metformin 1,000 mg oral tablet 07/24/2020 TAKE ONE TABLET BY MOUTH TWICE A DAY WITH MORNING AND EVENING MEALS   methocarbamol 500 mg oral tablet 11/18/2020 TAKE TWO TABLETS BY MOUTH FOUR TIMES A DAY AS NEEDED   potassium 99 mg oral tablet  --    Protonix 40 mg oral tablet,delayed release (DR/EC) 10/07/2020 take 1 tablet (40 mg) by oral route once daily for 30 days   relion  70/30 take 20 units twice daily   Tylenol 325 mg oral capsule  --    Vitamin c  take one tab 500mg once daily   Vitamin D3 oral  --    vitamin E oral  --          Allergy List  Allergen Name Date Reaction Notes   erythromycin --  --  --        Allergies Reconciled  Family Medical History  Disease Name Relative/Age Notes   Heart Disease Father/  Mother/   Father; Mother   Cancer, Unspecified Mother/   Mother   Diabetes, unspecified type Father/  Mother/  Son/   Mother; Father; Son   Hypertension  --    Leukemia  --    Family history of certain chronic disabling diseases; arthritis Father/  Mother/   Mother; Father         Social History  Finding  "Status Start/Stop Quantity Notes   Alcohol Use Never --/-- --  does not drink   lives with spouse --  --/-- --  --    . --  --/-- --  --    Recreational Drug Use Never --/-- --  no   Retired. --  --/-- --  --    Tobacco Never --/-- --  never smoker         Immunizations  NameDate Admin Mfg Trade Name Lot Number Route Inj VIS Given VIS Publication   InfluenzaRefused 06/24/2019 NE Not Entered  NE NE     Comments: Doesn't ever get vaccine         Review of Systems  · Constitutional  o Denies  o : fever, fatigue, weight loss, weight gain  · Cardiovascular  o Denies  o : lower extremity edema, claudication, chest pressure, palpitations  · Respiratory  o Denies  o : shortness of breath, wheezing, cough, hemoptysis, dyspnea on exertion  · Gastrointestinal  o Denies  o : nausea, vomiting, diarrhea, constipation, abdominal pain  · Musculoskeletal  o Admits  o : muscle pain, limitation of motion, neck pain  · Psychiatric  o Denies  o : anxiety, depression, suicidal ideation      Vitals  Date Time BP Position Site L\R Cuff Size HR RR TEMP (F) WT  HT  BMI kg/m2 BSA m2 O2 Sat FR L/min FiO2 HC       11/03/2020 03:26 /62 Sitting    80 - R 20 97 220lbs 4oz 5'  2\" 40.28 2.09 98 %  21%          Physical Examination  · Constitutional  o Appearance  o : no acute distress, well-nourished  · Head and Face  o Head  o :   § Inspection  § : atraumatic, normocephalic  · Eyes  o Eyes  o : extraocular movements intact, no scleral icterus, no conjunctival injection  · Ears, Nose, Mouth and Throat  o Ears  o :   § External Ears  § : normal  o Nose  o :   § Intranasal Exam  § : nares patent  o Oral Cavity  o :   § Oral Mucosa  § : moist mucous membranes  · Neck  o Range of Motion  o : range of motion reduced, pain with movement and palpation to right side of neck and upper back, bilateral shoulder ROM WNL  · Respiratory  o Respiratory Effort  o : breathing comfortably, symmetric chest rise  o Auscultation of Lungs  o : clear to " asculatation bilaterally, no wheezes, rales, or rhonchii  · Cardiovascular  o Heart  o :   § Auscultation of Heart  § : regular rate and rhythm, no murmurs, rubs, or gallops  o Peripheral Vascular System  o :   § Extremities  § : no edema  · Skin and Subcutaneous Tissue  o General Inspection  o : no lesions present, no areas of discoloration, skin turgor normal, texture normal  · Neurologic  o Mental Status Examination  o :   § Orientation  § : grossly oriented to person, place and time  o Cranial Nerves  o : crainial nerves 2-12 grossly intact  o Gait and Station  o :   § Gait Screening  § : normal gait  · Psychiatric  o General  o : normal mood and affect              Assessment  · Cervical pain (neck)     723.1/M54.2  · Visit for screening mammogram     V76.12/Z12.31  · Neck pain     723.1/M54.2  · Muscle ache     729.1/M79.10  · Muscle strain     848.9/T14.8XXA       neckpain/muscle strain:  start Robaxin 1000mg PO qid PRN   use Volatren gel to be used as directed, a refill was given  refilled Meloxicam 15mg qday; do not take with other NSAIDs   increase rest  use heat application  cervical xray in office today  Consider PT/MRI if needed       Plan  · Orders  o Cervical Spine Complete [facility] (31467) - 723.1/M54.2 - 11/03/2020  o Screening Mammography; Bilateral 3D (44350, 18038, ) - V76.12/Z12.31 - 11/03/2020   Diagnostic US  o ACO-39: Current medications updated and reviewed (, 1159F) - - 11/03/2020  o ACO-14: Influenza immunization was not administered for reasons documented [facility] () - - 11/03/2020   Declined  o ACO-13: Fall Risk Screening with no falls in past year or only one fall without injury in the past year (1101F) - - 11/03/2020   No falls.  · Medications  o methocarbamol 500 mg oral tablet   SIG: take 2 tablet by oral route 4 times a day as needed   DISP: (40) Tablet with 0 refills  Adjusted on 11/03/2020     o meloxicam 15 mg oral tablet   SIG: take 1 tablet (15 mg) by oral route once daily  for 30 days   DISP: (30) Tablet with 5 refills  Refilled on 11/03/2020     o Medications have been Reconciled  o Transition of Care or Provider Policy  · Instructions  o Take all medications as prescribed/directed.  o Rest. Increase Fluids.  o Patient was educated/instructed on their diagnosis, treatment and medications prior to discharge from the clinic today.  o Patient instructed to seek medical attention urgently for new or worsening symptoms.  o Call the office with any concerns or questions.  o Bring all medicines with their bottles to each office visit.  o Risks, benefits, and alternatives were discussed with the patient. The patient is aware of risks associated with: all meds and conditions.   o Chronic conditions reviewed and taken into consideration for today's treatment plan.  o Flu vaccine declined.  o Discussed Covid-19 precautions including, but not limited to, social distancing, avoid touching your face, and hand washing.   · Disposition  o Call or Return if symptoms worsen or persist.  o Follow Up PRN.  o Proceed to ED for all medical emergencies.            Electronically Signed by: RICHIE Corral -Author on December 2, 2020 05:42:44 PM

## 2021-05-13 NOTE — PROGRESS NOTES
Progress Note      Patient Name: Monica Patel   Patient ID: [id number]   Sex: Female   YOB: 1955    Primary Care Provider: Regina QUIROZ   Referring Provider: Regina QUIROZ    Visit Date: August 26, 2020    Provider: RICHIE Corral   Location: [facility]   Location Address: [address] [place] [address] [address]  [place], [place]  [id number]   Location Phone: [phone]          Chief Complaint  · Red stripes in throat      History Of Present Illness  Monica Patel is a 65 year old /White female who presents for evaluation and treatment of:   TELEHEALTH TELEPHONE VISIT  Monica Patel is a 65 year old /White female who is presenting for evaluation via telehealth telephone visit. Verbal consent obtained before beginning visit.   Provider spent 11 minutes with patient during telehealth visit.   The following staff were present during this visit: RICHIE Corral   Past Medical History/Overview of Patient Symptoms     Pt c/o sore throat, throat redness, nasal congestion/congestion, sinus pain/pressure x 3 days. Has taken Tylenol, Benadryl, and Zyrtec to treat without success. States faces is hurting above eyebrows. Denies fever, chills, HA, SOA, cough, sudden loss of taste or smell. Denies any recent travel or sick contacts.       Past Medical History  Disease Name Date Onset Notes   Allergic rhinitis --  --    Bladder Disorder --  --    Breast cancer in female --  --    Diabetes --  --    Diabetes, unspecified --  --    Interstitial cystitis --  --    Oncocytoma --  --    Overactiver Bladder --  --    Renal mass, right --  --    Thyroid disorder --  --          Past Surgical History  Procedure Name Date Notes   Breast biopsy --  --    Hand surgery, right --  --    Hysterectomy --  --    Lumpectomy of right breast --  --    Metal implants --  --    Robotic-assisted partial right nephrectomy 7-20-18 --          Medication List  Name Date  Started Instructions   Armidex 1 mg oral  take one tab once daily   Benadryl 25 mg oral capsule  --    biotin oral  --    citalopram 40 mg oral tablet 07/31/2020 TAKE ONE TABLET BY MOUTH DAILY   ferrous sulfate 325 mg (65 mg iron) oral tablet 02/11/2020 take 1 tablet (325 mg) by oral route 2 times per day for 30 days   glipizide 5 mg oral tablet 07/17/2020 TAKE THREE TABLETS BY MOUTH TWICE A DAY   Humulin 70/30 U-100 Insulin 100 unit/mL (70-30) subcutaneous suspension  inject by subcutaneous route as per insulin protocol   Januvia 100 mg oral tablet 07/07/2020 take 1 tablet (100 mg) by oral route once daily for 30 days   levothyroxine 50 mcg oral tablet 01/17/2020 take 1 tablet (50 mcg) by oral route once daily for 90 days   lisinopril 5 mg oral tablet 07/31/2020 TAKE ONE TABLET BY MOUTH DAILY   magnesium oral  --    meloxicam 15 mg oral tablet 08/21/2020 take 1 tablet (15 mg) by oral route once daily for 30 days   metformin 1,000 mg oral tablet 07/24/2020 TAKE ONE TABLET BY MOUTH TWICE A DAY WITH MORNING AND EVENING MEALS   potassium 99 mg oral tablet  --    relion  70/30 take 20 units twice daily   Tylenol 325 mg oral capsule  --    Vitamin c  take one tab 500mg once daily   Vitamin D3 oral  --    vitamin E oral  --    Voltaren 1 % topical gel 07/07/2020 apply 2 grams to the affected area(s) by topical route 4 times per day         Allergy List  Allergen Name Date Reaction Notes   erythromycin --  --  --          Family Medical History  Disease Name Relative/Age Notes   Heart Disease Father/  Mother/   Father; Mother   Cancer, Unspecified Mother/   Mother   Diabetes, unspecified type Father/  Mother/  Son/   Mother; Father; Son   Hypertension  --    Leukemia  --    Family history of certain chronic disabling diseases; arthritis Father/  Mother/   Mother; Father         Social History  Finding Status Start/Stop Quantity Notes   Alcohol Use Never --/-- --  does not drink   lives with spouse --  --/-- --  --     . --  --/-- --  --    Recreational Drug Use Never --/-- --  no   Retired. --  --/-- --  --    Tobacco Never --/-- --  never smoker         Immunizations  NameDate Admin Mfg Trade Name Lot Number Route Inj VIS Given VIS Publication   InfluenzaRefused 06/24/2019 NE Not Entered  NE NE     Comments: Doesn't ever get vaccine         Review of Systems  · Constitutional  o Denies  o : fever, fatigue, weight loss, weight gain  · HENT  o Admits  o : sinus pain, nasal congestion, nasal discharge, sore throat  o Denies  o : headaches  · Cardiovascular  o Denies  o : lower extremity edema, claudication, chest pressure, palpitations  · Respiratory  o Denies  o : shortness of breath, wheezing, cough, hemoptysis, dyspnea on exertion  · Gastrointestinal  o Denies  o : nausea, vomiting, diarrhea, constipation, abdominal pain  · Musculoskeletal  o Denies  o : muscle pain  · Psychiatric  o Denies  o : anxiety, depression, suicidal ideation, homicidal ideation  · Allergic-Immunologic  o Denies  o : frequent illnesses          Assessment  · Allergic rhinitis due to allergen     477.9/J30.9  · Acute non-recurrent frontal sinusitis     461.1/J01.10  · Sore throat     462/J02.9  · Nasal congestion     478.19/R09.81       Frontal sinusitis:  Augmentin 875mg PO bid x 7 days  Increase rest  Increase clear fluids  Tyl q4-6hrs PRN pain  Freq hand washing  Avoid allergy/sinus triggers         Plan  · Orders  o ACO-39: Current medications updated and reviewed () - - 08/26/2020  o Physician Telephone Evaluation, 11-20 minutes (43905) - - 08/26/2020  · Medications  o Augmentin 875-125 mg oral tablet   SIG: take 1 tablet by oral route every 12 hours for 7 days   DISP: (14) tablets with 0 refills  Prescribed on 08/26/2020     · Instructions  o Take all medications as prescribed/directed.  o Rest. Increase Fluids.  o Patient was educated/instructed on their diagnosis, treatment and medications prior to discharge from the clinic  today.  o Patient instructed to seek medical attention urgently for new or worsening symptoms.  o Call the office with any concerns or questions.  o Bring all medicines with their bottles to each office visit.  o Risks, benefits, and alternatives were discussed with the patient. The patient is aware of risks associated with: all meds and conditions.   o Chronic conditions reviewed and taken into consideration for today's treatment plan.  o Discussed Covid-19 precautions including, but not limited to, social distancing, avoid touching your face, and hand washing.   o Plan Of Care: Sinusitis  · Disposition  o Call or Return if symptoms worsen or persist.  o Follow Up PRN.  o Proceed to ED for all medical emergencies.            Electronically Signed by: RICHIE Corral -Author on August 26, 2020 10:05:28 AM

## 2021-05-13 NOTE — PROGRESS NOTES
Progress Note      Patient Name: Monica Patel   Patient ID: [id number]   Sex: Female   YOB: 1955    Primary Care Provider: Regina QUIROZ   Referring Provider: Regina QUIROZ    Visit Date: October 7, 2020    Provider: RICHIE Corral   Location: [facility]   Location Address: [address]  [place], [place]  [id number]          Chief Complaint  · sinus pain/drainage and acid reflux      History Of Present Illness  TELEHEALTH TELEPHONE VISIT  Monica Patel is a 65 year old /White female who is presenting for evaluation via telehealth telephone visit. Verbal consent obtained before beginning visit.   Provider spent 16 minutes with patient during telehealth visit.   The following staff were present during this visit: RICHIE Corral   Past Medical History/Overview of Patient Symptoms  Monica Patel is a 65 year old /White female who presents for evaluation and treatment of:      Pt presents with nasal congestion, sinus pain/pressure, cough x 1 week. She has not taken anything to treat. Denies fever, chills, SOB, myalgia, sore throat, sudden loss of taste or smell. Denies any sick contacts or recent travel.     Pt c/o acid reflux. States this has been an ongoing issues, but has worsened over the last few weeks. Reflux has gotten so bad it caused her to feel nauseous and vomit. Pain is worse when laying down. She has taken Rolaids to treat which has worked in the past, but now does not.       Past Medical History  Disease Name Date Onset Notes   Allergic rhinitis --  --    Bladder Disorder --  --    Breast cancer in female --  --    Diabetes --  --    Diabetes, unspecified --  --    Interstitial cystitis --  --    Oncocytoma --  --    Overactiver Bladder --  --    Renal mass, right --  --    Thyroid disorder --  --          Past Surgical History  Procedure Name Date Notes   Breast biopsy --  --    Hand surgery, right --  --    Hysterectomy  --  --    Lumpectomy of right breast --  --    Metal implants --  --    Robotic-assisted partial right nephrectomy 7-20-18 --          Medication List  Name Date Started Instructions   Armidex 1 mg oral  take one tab once daily   Augmentin 875-125 mg oral tablet 08/26/2020 take 1 tablet by oral route every 12 hours for 7 days   Benadryl 25 mg oral capsule  --    biotin oral  --    citalopram 40 mg oral tablet 07/31/2020 TAKE ONE TABLET BY MOUTH DAILY   ferrous sulfate 325 mg (65 mg iron) oral tablet 02/11/2020 take 1 tablet (325 mg) by oral route 2 times per day for 30 days   glipizide 5 mg oral tablet 07/17/2020 TAKE THREE TABLETS BY MOUTH TWICE A DAY   Humulin 70/30 U-100 Insulin 100 unit/mL (70-30) subcutaneous suspension  inject by subcutaneous route as per insulin protocol   Januvia 100 mg oral tablet 07/07/2020 take 1 tablet (100 mg) by oral route once daily for 30 days   levothyroxine 50 mcg oral tablet 01/17/2020 take 1 tablet (50 mcg) by oral route once daily for 90 days   lisinopril 5 mg oral tablet 07/31/2020 TAKE ONE TABLET BY MOUTH DAILY   magnesium oral  --    meloxicam 15 mg oral tablet 08/21/2020 take 1 tablet (15 mg) by oral route once daily for 30 days   metformin 1,000 mg oral tablet 07/24/2020 TAKE ONE TABLET BY MOUTH TWICE A DAY WITH MORNING AND EVENING MEALS   potassium 99 mg oral tablet  --    relion  70/30 take 20 units twice daily   Tylenol 325 mg oral capsule  --    Vitamin c  take one tab 500mg once daily   Vitamin D3 oral  --    vitamin E oral  --    Voltaren 1 % topical gel 07/07/2020 apply 2 grams to the affected area(s) by topical route 4 times per day         Allergy List  Allergen Name Date Reaction Notes   erythromycin --  --  --          Family Medical History  Disease Name Relative/Age Notes   Heart Disease Father/  Mother/   Father; Mother   Cancer, Unspecified Mother/   Mother   Diabetes, unspecified type Father/  Mother/  Son/   Mother; Father; Son   Hypertension  --     Leukemia  --    Family history of certain chronic disabling diseases; arthritis Father/  Mother/   Mother; Father         Social History  Finding Status Start/Stop Quantity Notes   Alcohol Use Never --/-- --  does not drink   lives with spouse --  --/-- --  --    . --  --/-- --  --    Recreational Drug Use Never --/-- --  no   Retired. --  --/-- --  --    Tobacco Never --/-- --  never smoker         Immunizations  NameDate Admin Mfg Trade Name Lot Number Route Inj VIS Given VIS Publication   InfluenzaRefused 06/24/2019 NE Not Entered  NE NE     Comments: Doesn't ever get vaccine         Review of Systems  · Constitutional  o Denies  o : fever, fatigue, weight loss, weight gain  · HENT  o Admits  o : headaches, sinus pain, nasal congestion  · Cardiovascular  o Denies  o : lower extremity edema, claudication, chest pressure, palpitations  · Respiratory  o Denies  o : shortness of breath, wheezing, cough, hemoptysis, dyspnea on exertion  · Gastrointestinal  o Admits  o : nausea, vomiting, GERD  o Denies  o : diarrhea, constipation, abdominal pain  · Psychiatric  o Denies  o : anxiety, depression, suicidal ideation, homicidal ideation          Assessment  · Allergic rhinitis due to allergen     477.9/J30.9  · Cough     786.2/R05  · Diabetes mellitus, type 2     250.00/E11.9  · GERD (gastroesophageal reflux disease)     530.81/K21.9  · Headache     784.0/R51  · Sinusitis, acute     461.9/J01.90  · Nausea & vomiting     787.01/R11.2       Sinusitis:  Augmentin 875mg PO bid x 7 day  Increase rest  increase clear fluids  Tyl q4-6hrs PRN pain/fever  Freq hand washing  Avoid allergy/sinus triggers    GERD:  Protonix 40mg PO qd  Discussed lifestyle modifications   Referral to gastroenterology  Proceed directly to ED if S/S worsen or if patient has difficulty swallowing, dizziness, syncope, chest pain, N/V, fever/chills, SOB.     Problems Reconciled  Plan  · Orders  o Gastroenterology Consultation (GASTR) -  530.81/K21.9 - 10/07/2020  o ACO-39: Current medications updated and reviewed (, 1159F) - - 10/07/2020  o Physician Telephone Evaluation, 11-20 minutes (06561) - - 10/07/2020  · Medications  o Protonix 40 mg oral tablet,delayed release (DR/EC)   SIG: take 1 tablet (40 mg) by oral route once daily for 30 days   DISP: (30) Tablet with 2 refills  Prescribed on 10/07/2020     o Augmentin 875-125 mg oral tablet   SIG: take 1 tablet by oral route every 12 hours for 7 days   DISP: (14) Tablet with 0 refills  Refilled on 10/07/2020     o Medications have been Reconciled  o Transition of Care or Provider Policy  · Instructions  o Continue blood sugar monitoring daily and record. Bring your log to office visits. Call the office for readings below 70 and above 250 or any complications.  o Daily foot care. Avoid walking barefoot. Annual Dilated Eye Exam.  o Discussed with patient blood pressure monitoring, hemoglobin A1C levels need to be below 7.0, and LDL (Lipid) goals below 70.  o Maintain a healthy weight. Avoid tight fitting clothes. Avoid fried, fatty foods, tomato sauce, chocolate, mint, garlic, onion, alcohol. caffeine. Eat smaller meals, dont lie down after a meal, dont smoke. Elevate the head of your bed 6-9 inches.  o Plan Of Care: GERD  o Chronic conditions reviewed and taken into consideration for today's treatment plan.  o Patient instructed to seek medical attention urgently for new or worsening symptoms.  o Patient was educated/instructed on their diagnosis, treatment and medications prior to discharge from the clinic today.  o Take all medications as prescribed/directed.  o Rest. Increase Fluids.  o Patient was instructed to exercise regularly.  o Call the office with any concerns or questions.  o Risks, benefits, and alternatives were discussed with the patient. The patient is aware of risks associated with: all meds and conditions.   o Discussed Covid-19 precautions including, but not limited to, social  distancing, avoid touching your face, and hand washing.   o Bring all medicines with their bottles to each office visit.  · Disposition  o Call or Return if symptoms worsen or persist.  o Follow Up PRN.  o Proceed to ED for all medical emergencies.            Electronically Signed by: RICHIE Corral -Author on October 7, 2020 03:19:59 PM

## 2021-05-13 NOTE — PROGRESS NOTES
Progress Note      Patient Name: Monica Patel   Patient ID: [id number]   Sex: Female   YOB: 1955    Primary Care Provider: Regina QUIROZ   Referring Provider: Regina QUIROZ    Visit Date: August 21, 2020    Provider: RICHIE Corral   Location: [facility]   Location Address: [address] [place] [address]  [address]  [place], [place]  [id number]   Location Phone: [phone]          Chief Complaint  · Annual Wellness Exam      History Of Present Illness  The patient is a 65 year old /White female who has come to this office for her Annual Wellness Visit.   Her Primary Care Provider is Regina QUIROZ. Her comprehensive Care Team list, including suppliers, has been updated on the Facesheet. Her medical/family history, height, weight, BMI, and blood pressure have been reviewed and are in the chart. The Health Risk Assessment has been completed and scanned in the chart.   Medications are listed in the medication list.   The active problem list includes: Allergic rhinitis, Breast cancer in female, Diabetes, unspecified, Interstitial cystitis, Oncocytoma, Overactiver Bladder, and Renal mass, right   The patient does not have a history of substance use.   Patient reports her diet is adequate.   The Mini-Cog has been administered and is scanned in chart. The results are negative. Her cognitive function is without limitation.   A hearing loss screen was completed today and the result is negative.   Patient has the following risk factors for depression: past experience with depression and family history of depression. Patient completed the PHQ-9 today and it has been scanned in the chart. The total score is 5-9.   The Timed Up and Go screen was administered today and the result is negative.   The Escalera Index of [place] in ADLs indicated full function (score of 6).   A Falls Risk Assessment has been completed, including a review of home fall hazards and  medication review.   Overall, the patient's functional ability is noted by this provider to be within normal limits. Her level of safety is noted to be within normal limits. Her balance/gait is within normal limits. There have been no falls in the past year. Patient-specific home safety recommendations have been reviewed and a copy has been given to patient.   She denies issues with leaking urine.   There are no additional risk factors identified.   Living Will/Advanced Directive has not previously been completed.   Personalized health advice was given to the patient and a written health screening schedule was established; see Plan for details.       Past Medical History  Disease Name Date Onset Notes   Allergic rhinitis --  --    Bladder Disorder --  --    Breast cancer in female --  --    Diabetes --  --    Diabetes, unspecified --  --    Interstitial cystitis --  --    Oncocytoma --  --    Overactiver Bladder --  --    Renal mass, right --  --    Thyroid disorder --  --          Past Surgical History  Procedure Name Date Notes   Breast biopsy --  --    Hand surgery, right --  --    Hysterectomy --  --    Lumpectomy of right breast --  --    Metal implants --  --    Robotic-assisted partial right nephrectomy 7-20-18 --          Medication List  Name Date Started Instructions   Armidex 1 mg oral  take one tab once daily   Augmentin 875-125 mg oral tablet 08/26/2020 take 1 tablet by oral route every 12 hours for 7 days   Benadryl 25 mg oral capsule  --    biotin oral  --    citalopram 40 mg oral tablet 07/31/2020 TAKE ONE TABLET BY MOUTH DAILY   ferrous sulfate 325 mg (65 mg iron) oral tablet 02/11/2020 take 1 tablet (325 mg) by oral route 2 times per day for 30 days   glipizide 5 mg oral tablet 07/17/2020 TAKE THREE TABLETS BY MOUTH TWICE A DAY   Humulin 70/30 U-100 Insulin 100 unit/mL (70-30) subcutaneous suspension  inject by subcutaneous route as per insulin protocol   Januvia 100 mg oral tablet 07/07/2020 take  "1 tablet (100 mg) by oral route once daily for 30 days   levothyroxine 50 mcg oral tablet 01/17/2020 take 1 tablet (50 mcg) by oral route once daily for 90 days   lisinopril 5 mg oral tablet 07/31/2020 TAKE ONE TABLET BY MOUTH DAILY   magnesium oral  --    meloxicam 15 mg oral tablet 08/21/2020 take 1 tablet (15 mg) by oral route once daily for 30 days   metformin 1,000 mg oral tablet 07/24/2020 TAKE ONE TABLET BY MOUTH TWICE A DAY WITH MORNING AND EVENING MEALS   potassium 99 mg oral tablet  --    relion  70/30 take 20 units twice daily   Tylenol 325 mg oral capsule  --    Vitamin c  take one tab 500mg once daily   Vitamin D3 oral  --    vitamin E oral  --    Voltaren 1 % topical gel 07/07/2020 apply 2 grams to the affected area(s) by topical route 4 times per day         Allergy List  Allergen Name Date Reaction Notes   erythromycin --  --  --          Family Medical History  Disease Name Relative/Age Notes   Heart Disease Father/  Mother/   Father; Mother   Cancer, Unspecified Mother/   Mother   Diabetes, unspecified type Father/  Mother/  Son/   Mother; Father; Son   Hypertension  --    Leukemia  --    Family history of certain chronic disabling diseases; arthritis Father/  Mother/   Mother; Father         Social History  Finding Status Start/Stop Quantity Notes   Alcohol Use Never --/-- --  does not drink   lives with spouse --  --/-- --  --    . --  --/-- --  --    Recreational Drug Use Never --/-- --  no   Retired. --  --/-- --  --    Tobacco Never --/-- --  never smoker         Immunizations  NameDate Admin Mfg Trade Name Lot Number Route Inj VIS Given VIS Publication   InfluenzaRefused 06/24/2019 NE Not Entered  NE NE     Comments: Doesn't ever get vaccine         Vitals  Date Time BP Position Site L\R Cuff Size HR RR TEMP (F) WT  HT  BMI kg/m2 BSA m2 O2 Sat HC       08/21/2020 03:27 /72 Sitting    80 - R 20 98 215lbs 9oz 5'  2\" 39.43 2.07 98 %          Physical " Examination  · Constitutional  o Appearance  o : well-nourished, well developed  · Head and Face  o Head  o :   § Inspection  § : atraumatic, normocephalic  o Face  o :   § Inspection  § : no facial lesions  · Eyes  o Eyes  o : extraocular movements intact, no scleral icterus, no conjunctival injection  · Ears, Nose, Mouth and Throat  o Ears  o :   § External Ears  § : normal  o Nose  o :   § Intranasal Exam  § : nares patent  o Oral Cavity  o :   § Oral Mucosa  § : moist mucous membranes  · Respiratory  o Respiratory Effort  o : breathing comfortably, symmetric chest rise  o Auscultation of Lungs  o : clear to asculatation bilaterally, no wheezes, rales, or rhonchii  · Cardiovascular  o Heart  o :   § Auscultation of Heart  § : regular rate and rhythm, no murmurs, rubs, or gallops  o Peripheral Vascular System  o :   § Extremities  § : no edema  · Lymphatic  o Neck  o : no lymphadenopathy present  o Supraclavicular Nodes  o : no supraclavicular nodes  · Skin and Subcutaneous Tissue  o General Inspection  o : no lesions present, no areas of discoloration, skin turgor normal  · Neurologic  o Mental Status Examination  o :   § Orientation  § : grossly oriented to person, place and time  o Gait and Station  o :   § Gait Screening  § : normal gait  · Psychiatric  o General  o : normal mood and affect              Assessment  · Encounter for Medicare annual wellness exam     V70.0/Z00.00  · Screening for depression     V79.0/Z13.89  · Screening for alcoholism     V79.1/Z13.39  · Advanced care planning/counseling discussion     V65.49/Z71.89  · Need for hepatitis C screening test     V73.89/Z11.59  · Sedentary lifestyle     V15.89/Z91.89  · Screening for HIV without presence of risk factors     V73.89/Z11.4      Plan  · Orders  o Falls Risk Assessment Completed (3288F) - V70.0/Z00.00 - 08/21/2020  o Brief hearing screening (written) [facility] () - V70.0/Z00.00 - 08/21/2020  o Annual wellness visit; includes a personalized  prevention plan of service (pps), initial visit () - V70.0/Z00.00 - 09/28/2020  o ACO-13: Fall Risk Screening with no falls in past year or only one fall without injury in the past year (1101F) - V70.0/Z00.00 - 09/28/2020  o Presence or absence of urinary incontinence assessed (KHOI) (1090F) - V70.0/Z00.00 - 08/21/2020  o ACO-18: Positive screen for clinical depression using a standardized tool and a follow-up plan documented () - V79.0/Z13.89 - 08/21/2020  o Negative alcohol screening () - V79.1/Z13.39 - 09/28/2020  o ACO - Pt declines to or was not able to provide an Advance Care Plan or name a Surrogate Decision Maker (1124F) - V65.49/Z71.89 - 09/28/2020  o Hepatitis C antibody MEDICARE screening [facility] (, [id number]) - V73.89/Z11.59 - 08/21/2020  o HIV Screening MEDICARE [facility] (, [address]) - V73.89/Z11.59 - 08/21/2020  o ACO-39: Current medications updated and reviewed () - - 08/21/2020  · Medications  o Medications have been Reconciled  o Transition of Care or Provider Policy  · Instructions  o Health Risk Assessment has been reviewed with the patient.  o Written health screening schedule for next 5-10 years was established with patient; information scanned in chart and given/mailed to patient.  o Fall prevention methods discussed and a copy of recommendations given/mailed to patient.  o Today's PHQ-9 score is: _8__  o Audit-C score of 0-4 - Negative Screen - Brief Discussion  o Benefits of exercise discussed and patient instructed to exercise regularly.  · Disposition  o Call or Return if symptoms worsen or persist.  o Follow Up PRN.  o Follow Up in 1 year.  o Proceed to ED for all medical emergencies.            Electronically Signed by: Seb Painter -Author on September 28, 2020 02:06:17 PM  Electronically Co-signed by: RICHIE Corral -Reviewer on September 28, 2020 04:14:54 PM

## 2021-05-14 VITALS
HEART RATE: 80 BPM | OXYGEN SATURATION: 100 % | HEIGHT: 62 IN | WEIGHT: 205 LBS | TEMPERATURE: 98 F | DIASTOLIC BLOOD PRESSURE: 72 MMHG | SYSTOLIC BLOOD PRESSURE: 121 MMHG | RESPIRATION RATE: 20 BRPM | BODY MASS INDEX: 37.73 KG/M2

## 2021-05-14 VITALS — WEIGHT: 220 LBS | HEIGHT: 62 IN | BODY MASS INDEX: 40.48 KG/M2

## 2021-05-14 VITALS
HEART RATE: 80 BPM | SYSTOLIC BLOOD PRESSURE: 128 MMHG | BODY MASS INDEX: 39.67 KG/M2 | WEIGHT: 215.56 LBS | DIASTOLIC BLOOD PRESSURE: 72 MMHG | TEMPERATURE: 98 F | HEIGHT: 62 IN | RESPIRATION RATE: 20 BRPM | OXYGEN SATURATION: 98 %

## 2021-05-14 VITALS
HEIGHT: 62 IN | RESPIRATION RATE: 20 BRPM | OXYGEN SATURATION: 98 % | HEART RATE: 80 BPM | TEMPERATURE: 97 F | SYSTOLIC BLOOD PRESSURE: 125 MMHG | WEIGHT: 220.25 LBS | BODY MASS INDEX: 40.53 KG/M2 | DIASTOLIC BLOOD PRESSURE: 62 MMHG

## 2021-05-14 NOTE — PROGRESS NOTES
Progress Note      Patient Name: Monica Patel   Patient ID: [id number]   Sex: Female   YOB: 1955    Primary Care Provider: Regina QUIROZ   Referring Provider: Regina QUIROZ    Visit Date: January 22, 2021    Provider: Aakash Francisco DO   Location: [facility]   Location Address: [address] [place] [address]  [id number]   Location Phone: [phone]          Chief Complaint  · Thinks she has a sinus infection, having pain in her face around her eyes that has gotten worse in the past week.   · Stometimes has a runny nose and then other times nose is stopped up.  · Has been taking Benadryl.       History Of Present Illness  TELEHEALTH TELEPHONE VISIT  Monica Patel is a 65 year old /White female who is presenting for evaluation via telehealth telephone visit. Verbal consent obtained before beginning visit.   Provider spent 12 minutes with patient during telehealth visit.   The following staff were present during this visit: Phyllis Ayala MA & Dr. Aakash Francisco   Past Medical History/Overview of Patient Symptoms  Monica Patel is a 65 year old /White female who presents for evaluation and treatment of:        Patient calls c/o sinus pain pressure for a month, no better with over the counter medicines, doesn't really want to take an antibiotic, pain pressure in head and ears though she is okay with it. No fever, sick contacts loss of smell or taste.     Last seen PCP 11/2020, PMH significant for T2DM last a1c was 2/2020 controlled fairly well at 7.3, on Januvia metformin needs dilated eye exam, foot and repeat labs upcoming.     Last AWV was 8/2020 can repeat in 6-7 months or per PCP           Past Medical History  Disease Name Date Onset Notes   Allergic rhinitis --  --    Bladder Disorder --  --    Breast cancer in female --  --    Diabetes --  --    Diabetes, unspecified --  --    Interstitial cystitis --  --    Oncocytoma --  --     Overactiver Bladder --  --    Renal mass, right --  --    Thyroid disorder --  --          Past Surgical History  Procedure Name Date Notes   Breast biopsy --  --    Hand surgery, right --  --    Hysterectomy --  --    Lumpectomy of right breast --  --    Metal implants --  --    Robotic-assisted partial right nephrectomy 7-20-18 --          Medication List  Name Date Started Instructions   Armidex 1 mg oral  take one tab once daily   atorvastatin 10 mg oral tablet 12/01/2020 take 1 tablet (10 mg) by oral route once daily at bedtime for 30 days   Benadryl 25 mg oral capsule  --    biotin oral  --    citalopram 40 mg oral tablet 07/31/2020 TAKE ONE TABLET BY MOUTH DAILY   DICLOFENAC SODIUM 1% GEL 11/03/2020 APPLY TWO GRAMS TO THE AFFECTED AREA(S) FOUR TIMES A DAY   ferrous sulfate 325 mg (65 mg iron) oral tablet 02/11/2020 take 1 tablet (325 mg) by oral route 2 times per day for 30 days   glipizide 5 mg oral tablet 12/02/2020 TAKE THREE TABLETS BY MOUTH TWICE A DAY   Humulin 70/30 U-100 Insulin 100 unit/mL (70-30) subcutaneous suspension  inject by subcutaneous route as per insulin protocol   Januvia 100 mg oral tablet 07/07/2020 take 1 tablet (100 mg) by oral route once daily for 30 days   levothyroxine 50 mcg oral tablet 10/12/2020 TAKE ONE TABLET BY MOUTH DAILY   lisinopril 5 mg oral tablet 07/31/2020 TAKE ONE TABLET BY MOUTH DAILY   magnesium oral  --    meloxicam 15 mg oral tablet 11/03/2020 take 1 tablet (15 mg) by oral route once daily for 30 days   metformin 1,000 mg oral tablet 01/08/2021 TAKE ONE TABLET BY MOUTH TWICE A DAY WITH MORNING AND EVENING MEALS   methocarbamol 500 mg oral tablet 12/09/2020 TAKE TWO TABLETS BY MOUTH FOUR TIMES A DAY AS NEEDED   PANTOPRAZOLE SOD DR 40 MG TAB 01/04/2021 TAKE ONE TABLET BY MOUTH DAILY   potassium 99 mg oral tablet  --    relion  70/30 take 20 units twice daily   Tylenol 325 mg oral capsule  --    Vitamin c  take one tab 500mg once daily   Vitamin D3 oral  --   "  vitamin E oral  --          Allergy List  Allergen Name Date Reaction Notes   erythromycin --  --  --        Allergies Reconciled  Family Medical History  Disease Name Relative/Age Notes   Heart Disease Father/  Mother/   Father; Mother   Cancer, Unspecified Mother/   Mother   Diabetes, unspecified type Father/  Mother/  Son/   Mother; Father; Son   Hypertension  --    Leukemia  --    Family history of certain chronic disabling diseases; arthritis Father/  Mother/   Mother; Father         Social History  Finding Status Start/Stop Quantity Notes   Alcohol Use Never --/-- --  does not drink   lives with spouse --  --/-- --  --    . --  --/-- --  --    Recreational Drug Use Never --/-- --  no   Retired. --  --/-- --  --    Tobacco Never --/-- --  never smoker         Immunizations  NameDate Admin Mfg Trade Name Lot Number Route Inj VIS Given VIS Publication   InfluenzaRefused 06/24/2019 NE Not Entered  NE NE     Comments: Doesn't ever get vaccine         Review of Systems  · Constitutional  o Denies  o : fever, fatigue, weight loss, weight gain  · HENT  o Admits  o : sinus pain, nasal congestion, nasal discharge, postnasal drip  o Denies  o : headaches, vertigo, lightheadedness, hearing loss, tinnitus  · Cardiovascular  o Denies  o : lower extremity edema, claudication, chest pressure, palpitations  · Respiratory  o Denies  o : shortness of breath, wheezing, cough, hemoptysis, dyspnea on exertion  · Gastrointestinal  o Denies  o : nausea, vomiting, diarrhea, constipation, abdominal pain  · Integument  o Denies  o : rash, itching, pigmentation changes, hair growth change, new skin lesions  · Neurologic  o Denies  o : altered mental status, muscular weakness, incoordination  · Psychiatric  o Denies  o : anxiety, depression      Vitals  Date Time BP Position Site L\R Cuff Size HR RR TEMP (F) WT  HT  BMI kg/m2 BSA m2 O2 Sat FR L/min FiO2 HC       01/22/2021 05:00 PM         220lbs 0oz 5'  2\" 40.24 2.09     "             Assessment  · Depression     311/F32.9  · Allergic rhinitis     477.9/J30.9  · Sinusitis     473.9/J32.9  · T2DM (type 2 diabetes mellitus)     250.00/E11.9  · HLD (hyperlipidemia)     272.4/E78.5         Sinusitis  allergic rhinitis  *acute on chronic  Will send cefdinir to treat sinusitis twice daily 10 days  precautions given    HLD  *CONTROLLED  can continue Lipitor 10 mg   cholesterol check last year stable controlled    T2DM, borderline control  *continue with Januvia and glipizide Metformin for diabetes   last A1c was 7.3 controlled essentially at goal at least 7-8   A1c for 59+-year-old with comorbidities    depression   *stable controlled   CONTINUE on Celexa 40 mg     f/u as above, labs before next f/u as last were 2/2020 and awv around 8/2021 due    advised her to follow-up with PCP for chronic conditions recheck labs will order them today for her PCP including A1c CMP lipid urine microalbumin to follow-up and discuss before next routine chronic care appointment     Problems Reconciled  Plan  · Orders  o Physician Telephone Evaluation, 11-20 minutes ([id number]) - 477.9/J30.9, 473.9/J32.9, 250.00/E11.9, 272.4/E78.5 - 01/22/2021  o ACO-39: Current medications updated and reviewed (, 1159F) - 477.9/J30.9, 473.9/J32.9 - 01/22/2021  o Diabetes 2 Panel (Urine Microalbumin, CMP, Lipid, A1c, ) [facility] ([id number], 38001, 75022, [address]7412) - 477.9/J30.9, 250.00/E11.9, 272.4/E78.5 - 01/22/2021   these labs need to go to kiara pcp can change to her and do before routine fu  · Medications  o cefdinir 300 mg oral capsule   SIG: take 1 capsule (300 mg) by oral route every 12 hours for 10 days   DISP: (20) Capsule with 0 refills  Prescribed on 01/22/2021     o Medications have been Reconciled  o Transition of Care or Provider Policy  · Instructions  o Chronic conditions reviewed and taken into consideration for today's treatment plan.  o Patient instructed to seek medical attention urgently for new or worsening  symptoms.  o Call the office with any concerns or questions.  o Risks, benefits, and alternatives were discussed with the patient. The patient is aware of risks associated with:  o Trusted Web sites were provided.  o Bring all medicines with their bottles to each office visit.  o Electronically Identified Patient Education Materials Provided Electronically  · Disposition  o Call or Return if symptoms worsen or persist.  o Follow up with PCP as scheduled or make as needed  o Labs before follow up ordered  o Appointment Requested ([id number]) and Recall created ([id number])  Careprovider : Regina QUIROZ ([id number])  [facility]  Appointment : Established / Office Visit  Date : 2 months +/- 2 days  Override : No  Comments/Instructions : labs before ordered to cc to regina carranza hld            Electronically Signed by: Aakash Francisco DO -Author on January 24, 2021 11:54:40 PM

## 2021-05-14 NOTE — PROGRESS NOTES
Progress Note      Patient Name: Monica Patel   Patient ID: [id number]   Sex: Female   YOB: 1955    Primary Care Provider: Regina QUIROZ   Referring Provider: Regina QUIROZ    Visit Date: March 1, 2021    Provider: RICHIE Corral   Location: [facility]   Location Address: [address] [place] [address]  [id number]   Location Phone: [phone]          Chief Complaint  · Follow up office visit within 14 calendar days of discharge from inpatient status (moderate complexity).      History Of Present Illness  FOLLOW UP OFFICE VISIT WITHIN 14 CALENDAR DAYS OF INPATIENT STATUS (MODERATE COMPLEXITY)  Monica Patel presents to office for follow up post discharge from inpatient status within 14 calendar days. Patient was contacted within 2 business days via phone conversation. Documentation of that phone contact is present in the patient's electronic chart. Patient was admitted to inpatient facility on 02/15/2021 and discharged 03/18/2021 due to: New liver metastasis..   Admitting MD: Ruben Mcallister MD   Her discharge summary has been reviewed and placed in the patient's electronic chart.   Patient's problem list is: Allergic rhinitis, Breast cancer in female, Diabetes, unspecified, Interstitial cystitis, Oncocytoma, Overactiver Bladder, and Renal mass, right   Patient's outpatient medication list has been reconcilled with the medication list from the discharge summary and has been reviewed with the patient. Current medication list is: Benadryl 25 mg oral capsule, biotin oral, cetirizine 10 mg oral tablet, citalopram 40 mg oral tablet, Culturelle 15 billion cell oral capsule, sprinkle, DICLOFENAC SODIUM 1% GEL, ferrous sulfate 325 mg (65 mg iron) oral tablet, furosemide 20 mg oral tablet, glipizide 5 mg oral tablet, Humulin 70/30 U-100 Insulin 100 unit/mL (70-30) subcutaneous suspension, Januvia 100 mg oral tablet, lactulose 10 gram/15 mL oral solution,  levothyroxine 50 mcg oral tablet, magnesium oral, metformin 1,000 mg oral tablet, methocarbamol 500 mg oral tablet, miconazole (bulk) miscellaneous powder, midodrine 10 mg oral tablet, PANTOPRAZOLE SOD DR 40 MG TAB, potassium 99 mg oral tablet, relion, sulfamethoxazole-trimethoprim 800-160 mg oral tablet, Tylenol 325 mg oral capsule, Vitamin c, Vitamin D3 oral, vitamin E oral, Xifaxan 550 mg oral tablet, zinc gluconate 50 mg oral tablet, and Zofran 4 mg oral tablet   Monica Patel is a 65 year old /White female who presents for evaluation and treatment of:      Pt with R breast cancer 2019, lumpectomy, chem/radiation, now with liver mets, cirrohosis dx on outpatient CT scan. Needs appt with Dr. Gustavo BURRIS.  states he has not been able to get a hold of office. Has appt with Dr. Bashir 3/24/21.     Needs FMLA pwk filled out for intermittent leave for daughter.       Past Medical History  Disease Name Date Onset Notes   Allergic rhinitis --  --    Bladder disorder --  --    Breast cancer in female --  --    Diabetes --  --    Diabetes, unspecified --  --    Interstitial cystitis --  --    Oncocytoma --  --    Overactiver Bladder --  --    Renal mass, right --  --    Thyroid disorder --  --          Past Surgical History  Procedure Name Date Notes   Breast biopsy --  --    Hand surgery, right --  --    Hysterectomy --  --    Lumpectomy of right breast --  --    Metal implants --  --    Robotic-assisted partial right nephrectomy 7-20-18 --          Medication List  Name Date Started Instructions   Benadryl 25 mg oral capsule  --    biotin oral  --    cetirizine 10 mg oral tablet  take 1 tablet (10 mg) by oral route once daily for 30 days   citalopram 40 mg oral tablet 07/31/2020 TAKE ONE TABLET BY MOUTH DAILY   Culturelle 15 billion cell oral capsule, sprinkle  take 1 capsule by oral route or sprinkle on food 2 times a day for 30 days   DICLOFENAC SODIUM 1% GEL 11/03/2020 APPLY TWO GRAMS TO THE  AFFECTED AREA(S) FOUR TIMES A DAY   ferrous sulfate 325 mg (65 mg iron) oral tablet 02/11/2020 take 1 tablet (325 mg) by oral route 2 times per day for 30 days   furosemide 20 mg oral tablet 03/01/2021 take 2 tablets (40 mg) by oral route once daily for 30 days   glipizide 5 mg oral tablet 12/02/2020 TAKE THREE TABLETS BY MOUTH TWICE A DAY   Humulin 70/30 U-100 Insulin 100 unit/mL (70-30) subcutaneous suspension  inject by subcutaneous route as per insulin protocol   Januvia 100 mg oral tablet 07/07/2020 take 1 tablet (100 mg) by oral route once daily for 30 days   lactulose 10 gram/15 mL oral solution 03/01/2021 take 30 milliliters (20 gram) by oral route 3 times per day for 30 days   levothyroxine 50 mcg oral tablet 10/12/2020 TAKE ONE TABLET BY MOUTH DAILY   magnesium oral  --    metformin 1,000 mg oral tablet 01/08/2021 TAKE ONE TABLET BY MOUTH TWICE A DAY WITH MORNING AND EVENING MEALS   methocarbamol 500 mg oral tablet 12/09/2020 TAKE TWO TABLETS BY MOUTH FOUR TIMES A DAY AS NEEDED   miconazole (bulk) miscellaneous powder  use as directed for 30 days   midodrine 10 mg oral tablet  take 1 tablet (10 mg) by oral route 3 times per day for 30 days   PANTOPRAZOLE SOD DR 40 MG TAB 01/04/2021 TAKE ONE TABLET BY MOUTH DAILY   potassium 99 mg oral tablet  --    relion  70/30 take 20 units twice daily   sulfamethoxazole-trimethoprim 800-160 mg oral tablet  take 1 tablet by oral route every 12 hours for 30 days   Tylenol 325 mg oral capsule  --    Vitamin c  take one tab 500mg once daily   Vitamin D3 oral  --    vitamin E oral  --    Xifaxan 550 mg oral tablet  take 1 tablet (550 mg) by oral route 2 times per day for 30 days   zinc gluconate 50 mg oral tablet  take 1 tablet by oral route daily for 30 days   Zofran 4 mg oral tablet  take 1 tablet by oral route every 6 hours for 30 days         Allergy List  Allergen Name Date Reaction Notes   erythromycin --  --  --        Allergies Reconciled  Family [facility]  "History  Disease Name Relative/Age Notes   Heart Disease Father/  Mother/   Father; Mother   Cancer, Unspecified Mother/   Mother   Diabetes, unspecified type Father/  Mother/  Son/   Mother; Father; Son   Hypertension  --    Leukemia  --    Family history of certain chronic disabling diseases; arthritis Father/  Mother/   Mother; Father         Social History  Finding Status Start/Stop Quantity Notes   Alcohol Use Never --/-- --  does not drink   lives with spouse --  --/-- --  --    . --  --/-- --  --    Recreational Drug Use Never --/-- --  no   Retired. --  --/-- --  --    Tobacco Never --/-- --  never smoker         Immunizations  NameDate Admin Mfg Trade Name Lot Number Route Inj VIS Given VIS Publication   InfluenzaRefused 06/24/2019 NE Not Entered  NE NE     Comments: Doesn't ever get vaccine         Review of Systems  · Constitutional  o Denies  o : fever, weight gain, weight loss, fatigue  · HENT  o Denies  o : headaches, nasal congestion, sore throat  · Cardiovascular  o Denies  o : palpitation, chest pain, claudication, lower extremity edema  · Respiratory  o Denies  o : shortness of breath, hemoptysis, wheezing, dry cough, productive cough  · Gastrointestinal  o Admits  o : abdominal pain  o Denies  o : nausea, vomiting, diarrhea, constipation  · Neurologic  o Denies  o : unsteady gait, weakness, dizziness, H/A  · Psychiatric  o Denies  o : anxiety, depression, suicidal ideation, homicidal ideation      Vitals  Date Time BP Position Site L\R Cuff Size HR RR TEMP (F) WT  HT  BMI kg/m2 BSA m2 O2 Sat FR L/min FiO2        03/01/2021 04:04 /72 Sitting    80 - R 20 98 204lbs 16oz 5'  2\" 37.49 2.02 100 %  21%          Physical Examination  · Constitutional  o Appearance  o : no acute distress, well-nourished  · Head and Face  o Head  o :   § Inspection  § : atraumatic, normocephalic  o Face  o :   § Inspection  § : no facial lesions  · Eyes  o Eyes  o : extraocular movements intact, scleral " "icterus  · Ears, Nose, Mouth and Throat  o Ears  o :   § External Ears  § : normal  o Nose  o :   § Intranasal Exam  § : nares patent  o Oral Cavity  o :   § Oral Mucosa  § : moist mucous membranes  · Respiratory  o Respiratory Effort  o : breathing comfortably, symmetric chest rise  o Auscultation of Lungs  o : clear to asculatation bilaterally, no wheezes, rales, or rhonchii  · Cardiovascular  o Heart  o :   § Auscultation of Heart  § : regular rate and rhythm, no murmurs, rubs, or gallops  o Peripheral Vascular System  o :   § Extremities  § : no edema  · Gastrointestinal  o Abdominal Examination  o :   § Abdomen  § : bowel sounds present, distended, non-tender  · Lymphatic  o Neck  o : no lymphadenopathy present  · Skin and Subcutaneous Tissue  o General Inspection  o : jaundice  · Neurologic  o Mental Status Examination  o :   § Orientation  § : grossly oriented to person, place and time  o Gait and Station  o :   § Gait Screening  § : normal gait  · Psychiatric  o General  o : normal mood and affect              Assessment  · Liver metastasis     197.7/C78.7  · Abdominal pain     789.00/R10.9  · Diabetes mellitus, type 2     250.00/E11.9  · Essential hypertension     401.9/I10  · Hyperlipidemia     272.4/E78.5  · Breast cancer     174.9/C50.919  · Jaundice     782.4/R17  · Ascites     789.59/R18.8       Liver mets:  Referral to Dr. Gustavo BURRIS (needed appt within 4 days of discharge)  LOW NA diet discussed with patient, check labels closely     Hyperlipidemia:  DC atorvastatin     Hypertension:  States has been \"dropping low\"  Continue to take BP at home, keep log, bring to next appt  May consider DC/decrease Lisinopril  Continue Lasix and Spironalactone as prescribed         Plan  · Orders  o Discharge medications reconciled with the current medication list (1111F) - - 03/01/2021  o ACO-39: Current medications updated and reviewed (1159F, ) - - 03/01/2021  o ONCOLOGY / HEMATOLOGY CONSULTATION (HEMOC) - " 197.7/C78.7, 789.00/R10.9, 789.59/R18.8, 174.9/C50.919 - 03/01/2021   Dr. Chen (pt was to be seen 4 days after DC by Dr. Chen)  · Medications  o Medications have been Reconciled  o Transition of Care or Provider Policy  · Instructions  o Instructed to seek medical attention urgently for new or worsening symptoms.  o Recommended BRAT diet.  o Clear fluids and avoid dairy for the next 24 hours.  o Continue blood sugar monitoring daily and record. Bring your log to office visits. Call the office for readings below 70 and above 250 or any complications.  o Discussed with patient blood pressure monitoring, hemoglobin A1C levels need to be below 7.0, and LDL (Lipid) goals below 70.  o Patient advised to monitor blood pressure (B/P) at home and journal readings. Patient informed that a B/P reading at home of more than 130/80 is considered hypertension. For readings greater [id number]/90 or higher patient is advised to follow up in the office with readings for management. Patient advised to limit sodium intake.  o Advised that cheeses and other sources of dairy fats, animal fats, fast food, and the extras (candy, pastries, pies, doughnuts and cookies) all contain LDL raising nutrients. Advised to increase fruits, vegetables, whole grains, and to monitor portion sizes.   o Patient discharge summary has been reviewed and placed in patient's electronic medical record.  o Patient received a phone call from my office within 2 business days of discharge from inpatient status, and documented within the patient's chart.  o Also patient was seen (face to face) for follow up evaluation within 14 calendar days of discharge from inpatient status for a severe complexity issue.  o Patient was educated on their diagnosis, treatment and any medication changes while being evaluated today.  o Take all medications as prescribed/directed.  o Patient was educated/instructed on their diagnosis, treatment and medications prior to discharge from  the clinic today.  o Patient instructed to seek medical attention urgently for new or worsening symptoms.  o Call the office with any concerns or questions.  o Bring all medicines with their bottles to each office visit.  o Risks, benefits, and alternatives were discussed with the patient. The patient is aware of risks associated with:  o Chronic conditions reviewed and taken into consideration for today's treatment plan.  o Discussed Covid-19 precautions including, but not limited to, social distancing, avoid touching your face, and hand washing.   · Disposition  o Call or Return if symptoms worsen or persist.  o Follow Up PRN.  o Follow Up in 3 months.  o Proceed to ED for all medical emergencies.            Electronically Signed by: RICHIE Corral -Author on March 1, 2021 04:55:08 PM

## 2021-05-15 VITALS
HEART RATE: 94 BPM | WEIGHT: 215 LBS | OXYGEN SATURATION: 99 % | DIASTOLIC BLOOD PRESSURE: 69 MMHG | HEIGHT: 62 IN | RESPIRATION RATE: 20 BRPM | SYSTOLIC BLOOD PRESSURE: 118 MMHG | TEMPERATURE: 97 F | BODY MASS INDEX: 39.56 KG/M2

## 2021-05-15 VITALS
HEIGHT: 62 IN | OXYGEN SATURATION: 100 % | HEART RATE: 79 BPM | BODY MASS INDEX: 38.9 KG/M2 | WEIGHT: 211.37 LBS | RESPIRATION RATE: 16 BRPM | TEMPERATURE: 97.4 F | DIASTOLIC BLOOD PRESSURE: 63 MMHG | SYSTOLIC BLOOD PRESSURE: 131 MMHG

## 2021-05-15 VITALS
SYSTOLIC BLOOD PRESSURE: 119 MMHG | RESPIRATION RATE: 20 BRPM | HEIGHT: 62 IN | TEMPERATURE: 98.6 F | DIASTOLIC BLOOD PRESSURE: 66 MMHG | OXYGEN SATURATION: 98 % | WEIGHT: 219 LBS | BODY MASS INDEX: 40.3 KG/M2 | HEART RATE: 88 BPM

## 2021-05-15 VITALS
HEART RATE: 80 BPM | TEMPERATURE: 98.8 F | DIASTOLIC BLOOD PRESSURE: 72 MMHG | WEIGHT: 215.12 LBS | HEIGHT: 62 IN | BODY MASS INDEX: 39.59 KG/M2 | SYSTOLIC BLOOD PRESSURE: 132 MMHG | RESPIRATION RATE: 20 BRPM | OXYGEN SATURATION: 97 %

## 2021-05-15 VITALS
SYSTOLIC BLOOD PRESSURE: 115 MMHG | RESPIRATION RATE: 20 BRPM | DIASTOLIC BLOOD PRESSURE: 62 MMHG | WEIGHT: 219 LBS | TEMPERATURE: 97 F | BODY MASS INDEX: 40.3 KG/M2 | HEART RATE: 88 BPM | HEIGHT: 62 IN | OXYGEN SATURATION: 100 %

## 2021-05-16 VITALS — HEIGHT: 62 IN | BODY MASS INDEX: 39.93 KG/M2 | RESPIRATION RATE: 16 BRPM | WEIGHT: 217 LBS

## 2021-05-16 VITALS — HEIGHT: 62 IN | OXYGEN SATURATION: 98 % | HEART RATE: 115 BPM

## 2021-05-16 VITALS — RESPIRATION RATE: 16 BRPM | WEIGHT: 214 LBS | BODY MASS INDEX: 39.38 KG/M2 | HEIGHT: 62 IN

## 2021-05-16 VITALS — WEIGHT: 210.5 LBS | HEIGHT: 62 IN | OXYGEN SATURATION: 98 % | HEART RATE: 118 BPM | BODY MASS INDEX: 38.74 KG/M2

## 2021-05-16 VITALS
DIASTOLIC BLOOD PRESSURE: 72 MMHG | WEIGHT: 217.25 LBS | SYSTOLIC BLOOD PRESSURE: 138 MMHG | HEIGHT: 62 IN | BODY MASS INDEX: 39.98 KG/M2

## 2021-05-16 VITALS — RESPIRATION RATE: 16 BRPM | HEIGHT: 62 IN | BODY MASS INDEX: 39.2 KG/M2 | WEIGHT: 213 LBS

## 2021-05-16 VITALS
SYSTOLIC BLOOD PRESSURE: 126 MMHG | DIASTOLIC BLOOD PRESSURE: 80 MMHG | BODY MASS INDEX: 39.2 KG/M2 | HEIGHT: 62 IN | WEIGHT: 213 LBS

## 2021-05-16 VITALS — OXYGEN SATURATION: 97 % | HEIGHT: 62 IN | HEART RATE: 68 BPM | WEIGHT: 209 LBS | BODY MASS INDEX: 38.46 KG/M2

## 2021-05-16 VITALS — BODY MASS INDEX: 39.2 KG/M2 | RESPIRATION RATE: 16 BRPM | WEIGHT: 213 LBS | HEIGHT: 62 IN

## 2021-05-16 VITALS
SYSTOLIC BLOOD PRESSURE: 138 MMHG | HEIGHT: 62 IN | DIASTOLIC BLOOD PRESSURE: 82 MMHG | WEIGHT: 214.12 LBS | BODY MASS INDEX: 39.4 KG/M2

## 2021-05-16 VITALS — WEIGHT: 211 LBS | RESPIRATION RATE: 16 BRPM | BODY MASS INDEX: 38.83 KG/M2 | HEIGHT: 62 IN

## 2021-05-28 VITALS
TEMPERATURE: 98.1 F | OXYGEN SATURATION: 98 % | TEMPERATURE: 97.8 F | WEIGHT: 210.1 LBS | OXYGEN SATURATION: 100 % | DIASTOLIC BLOOD PRESSURE: 64 MMHG | WEIGHT: 218.03 LBS | BODY MASS INDEX: 38.63 KG/M2 | HEIGHT: 63 IN | DIASTOLIC BLOOD PRESSURE: 52 MMHG | HEIGHT: 63 IN | OXYGEN SATURATION: 100 % | BODY MASS INDEX: 37.23 KG/M2 | DIASTOLIC BLOOD PRESSURE: 69 MMHG | SYSTOLIC BLOOD PRESSURE: 145 MMHG | HEIGHT: 63 IN | HEART RATE: 98 BPM | HEART RATE: 97 BPM | HEIGHT: 63 IN | HEART RATE: 102 BPM | BODY MASS INDEX: 38.44 KG/M2 | SYSTOLIC BLOOD PRESSURE: 116 MMHG | SYSTOLIC BLOOD PRESSURE: 122 MMHG | TEMPERATURE: 97.3 F | SYSTOLIC BLOOD PRESSURE: 127 MMHG | WEIGHT: 206.13 LBS | WEIGHT: 216.93 LBS | DIASTOLIC BLOOD PRESSURE: 67 MMHG | HEART RATE: 93 BPM | TEMPERATURE: 98.3 F | BODY MASS INDEX: 36.52 KG/M2 | OXYGEN SATURATION: 100 %

## 2021-05-28 VITALS
DIASTOLIC BLOOD PRESSURE: 70 MMHG | HEART RATE: 96 BPM | WEIGHT: 204.59 LBS | HEART RATE: 103 BPM | TEMPERATURE: 98.1 F | OXYGEN SATURATION: 99 % | HEART RATE: 88 BPM | OXYGEN SATURATION: 100 % | OXYGEN SATURATION: 100 % | OXYGEN SATURATION: 100 % | TEMPERATURE: 97.9 F | RESPIRATION RATE: 20 BRPM | DIASTOLIC BLOOD PRESSURE: 71 MMHG | HEART RATE: 117 BPM | OXYGEN SATURATION: 99 % | BODY MASS INDEX: 36.72 KG/M2 | TEMPERATURE: 98 F | HEIGHT: 63 IN | HEART RATE: 107 BPM | HEIGHT: 63 IN | HEART RATE: 100 BPM | WEIGHT: 211.42 LBS | HEART RATE: 110 BPM | BODY MASS INDEX: 37.81 KG/M2 | SYSTOLIC BLOOD PRESSURE: 110 MMHG | HEIGHT: 63 IN | RESPIRATION RATE: 16 BRPM | DIASTOLIC BLOOD PRESSURE: 69 MMHG | HEIGHT: 63 IN | HEART RATE: 103 BPM | BODY MASS INDEX: 37.77 KG/M2 | WEIGHT: 214.51 LBS | BODY MASS INDEX: 38.01 KG/M2 | TEMPERATURE: 96.8 F | OXYGEN SATURATION: 99 % | HEART RATE: 89 BPM | BODY MASS INDEX: 37.46 KG/M2 | SYSTOLIC BLOOD PRESSURE: 127 MMHG | DIASTOLIC BLOOD PRESSURE: 74 MMHG | OXYGEN SATURATION: 98 % | TEMPERATURE: 97.5 F | RESPIRATION RATE: 16 BRPM | HEART RATE: 106 BPM | DIASTOLIC BLOOD PRESSURE: 62 MMHG | SYSTOLIC BLOOD PRESSURE: 122 MMHG | BODY MASS INDEX: 38.32 KG/M2 | DIASTOLIC BLOOD PRESSURE: 67 MMHG | HEIGHT: 63 IN | WEIGHT: 215.61 LBS | SYSTOLIC BLOOD PRESSURE: 122 MMHG | OXYGEN SATURATION: 97 % | TEMPERATURE: 98.7 F | TEMPERATURE: 98.1 F | HEIGHT: 63 IN | TEMPERATURE: 97.1 F | BODY MASS INDEX: 38.2 KG/M2 | HEIGHT: 63 IN | WEIGHT: 213.41 LBS | RESPIRATION RATE: 20 BRPM | OXYGEN SATURATION: 100 % | HEIGHT: 63 IN | DIASTOLIC BLOOD PRESSURE: 68 MMHG | SYSTOLIC BLOOD PRESSURE: 116 MMHG | DIASTOLIC BLOOD PRESSURE: 61 MMHG | TEMPERATURE: 97.7 F | SYSTOLIC BLOOD PRESSURE: 134 MMHG | WEIGHT: 213.19 LBS | WEIGHT: 207.23 LBS | SYSTOLIC BLOOD PRESSURE: 142 MMHG | HEIGHT: 63 IN | SYSTOLIC BLOOD PRESSURE: 138 MMHG | DIASTOLIC BLOOD PRESSURE: 69 MMHG | BODY MASS INDEX: 37.42 KG/M2 | BODY MASS INDEX: 37.42 KG/M2 | HEIGHT: 63 IN | BODY MASS INDEX: 36.25 KG/M2 | OXYGEN SATURATION: 100 % | TEMPERATURE: 97.7 F | WEIGHT: 216.27 LBS | SYSTOLIC BLOOD PRESSURE: 136 MMHG | WEIGHT: 211.2 LBS | WEIGHT: 211.2 LBS

## 2021-05-28 VITALS
RESPIRATION RATE: 18 BRPM | DIASTOLIC BLOOD PRESSURE: 66 MMHG | OXYGEN SATURATION: 95 % | BODY MASS INDEX: 36.29 KG/M2 | WEIGHT: 198.41 LBS | RESPIRATION RATE: 20 BRPM | WEIGHT: 209.44 LBS | TEMPERATURE: 98 F | HEART RATE: 102 BPM | DIASTOLIC BLOOD PRESSURE: 62 MMHG | HEART RATE: 96 BPM | SYSTOLIC BLOOD PRESSURE: 151 MMHG | SYSTOLIC BLOOD PRESSURE: 157 MMHG | OXYGEN SATURATION: 100 % | TEMPERATURE: 97 F | BODY MASS INDEX: 38.31 KG/M2

## 2021-05-28 VITALS
SYSTOLIC BLOOD PRESSURE: 120 MMHG | TEMPERATURE: 97.6 F | OXYGEN SATURATION: 99 % | SYSTOLIC BLOOD PRESSURE: 115 MMHG | DIASTOLIC BLOOD PRESSURE: 67 MMHG | WEIGHT: 211.42 LBS | HEIGHT: 63 IN | HEART RATE: 98 BPM | WEIGHT: 210.1 LBS | TEMPERATURE: 97.3 F | BODY MASS INDEX: 37.46 KG/M2 | HEIGHT: 63 IN | DIASTOLIC BLOOD PRESSURE: 60 MMHG | OXYGEN SATURATION: 100 % | BODY MASS INDEX: 37.23 KG/M2 | HEART RATE: 100 BPM

## 2021-05-28 VITALS
HEIGHT: 63 IN | RESPIRATION RATE: 16 BRPM | TEMPERATURE: 98 F | HEART RATE: 94 BPM | WEIGHT: 217.15 LBS | BODY MASS INDEX: 38.48 KG/M2 | OXYGEN SATURATION: 100 %

## 2021-05-28 NOTE — PROGRESS NOTES
Patient: KONG FORBES     Acct: [id number]     Report: #[id number]  UNIT #: [id number]     : 1955    Encounter Date:10/18/2018  PRIMARY CARE: VILMA ZIEGLER DO  ***Signed***  --------------------------------------------------------------------------------------------------------------------  ADDENDUM: 10/22/18 1054          Addendum: Herbert Moody on 10/22/18 @ 10:54      Addendum      Chart Notes      HPI interim:  Patient presents for worsening fatigue.  She states that her     fatigue has worsened since completing surgery and chemotherapy.  We discussed     increasing exercise.            Physical Exam      General Appearance:  Alert, Cooperative, Oriented X3      Eyes:  Anicteric Sclerae, Moist Conjunctiva, PERRLA      HEENT:  Orophraynx clear, No Erythema, No Exudates      Respiratory:  CTAB, No Diminished Breath, No Rales      Breast\Chest:  Symmetrical, No Nipple Discharge, No Masses      Gastrointestinal:  Soft, No NABS, No Masses      Cardio:  RRR, No Murmur, No, Peripheral Edema      Skin:  Normal Temperature, Normal Tone, Normal Texture and Turgor      Psychiatric:  Appropriate Effect, Intact Judgement, AAO x3      Neuro:  Cranial Nerve II-XII Inta, No Focal Sensory Deficit      Muscularskeletal:  Normal Gait and Station, Full ROM of extremeties, Full muscle    strength\tone      Extremities:  No Digital Cyanosis, No Digital Ischemia, Pedal Pulses Intact      Lymphatic:  No Axillary, No Cervical, No Infraclavicular, No Supraclavicular            Assessment and plan      -Lobular breast cancer      -[id number].  Stage IIB. Right, upper, outer quadrant with 2 axillary nodes.        -Post lumpectomy and sentinel lymph node biopsy showed 3 cm residual cancer and     2 positive lymph nodes.       -Completed radiation treatment with Dr. Aguiar for radiation treatment.  Ki-67     is 2%.  ER 60% and VT 80%.      -Continue Arimidex.             -Oncocytoma      -There is a 1.8 x 2 cm renal mass  noted on CT abdomen pelvis on March 7-2018.      -Orders placed to follow-up with Dr. Mcintosh for further evaluation.        -negative margins.       -Follow up with Dr. Mcintosh.             -Notes      -Patient's imaging exams, blood tests, physicians' notes, and any new findings     since our last visit were reviewed today to reassess patient's medical treatment    plan. .       -Old medical records were reviewed and summarized in chronological order in the     HPI today to maintain an updated medical record.       -Patient's radiology imaging tests from our last visit were reviewed     independently by me by direct visualization of the images.        -Patients current lab tests and medications were carefully reviewed to evaluate     patient's current treatment plan today.       -Patient was advised to call us right away if there are any new symptoms for an     urgent visit for further evaluation because this may be due to cancer     recurrence. Patient voiced understanding and agreed to do so.      -Plan today.  Continue Arimidex.            Visit Type      Established Patient Visit            Chief Complaint      BREAST CANCER            Referring Provider/Copies To      Referring Provider:  Pita Mcintosh      Provider Not Found in Lookup:  ANTOINETTE ARCE            Allergies      Coded Allergies:             ERYTHROMYCIN BASE (Verified  Adverse Reaction, Unknown, Severe Headache,     10/18/18)            Medications      Last Reconciled on 9/13/18 12:35 by ERIC PARIS      ARIPiprazole (ABILIFY) 15 Mg Tablet      15 MG PO QDAY for 30 Days, #30 TAB 2 Refills         Prov: ERIC PARIS onc         9/13/18       Insulin Asp/Prt/Insulin Aspart (NovoLOG Mix 70/30 FLEXPEN*) 100 Unit/1 Ml     Insuln.pen      25 UNITS SUBQ BID INSULIN for 30 Days, #1 BOX 2 Refills         Prov: ERIC PARIS onc         9/13/18       glipiZIDE (glipiZIDE*) 5 Mg Tablet      15 MG PO BID, #180 TAB 2 Refills         Prov:  MIMISCERIC PACHECO onc         9/13/18       Citalopram HBr (CeleXA) 40 Mg Tablet      40 MG PO QDAY, #30 TAB 2 Refills         Prov: ERIC PARIS onc         9/13/18       Lisinopril* (Lisinopril*) 5 Mg Tablet      5 MG PO QDAY, #30 TAB 2 Refills         Prov: ERIC PARIS onc         9/13/18       Levothyroxine (Levothyroxine) 0.25 Mg Tablet      50 MCG PO QDAY@07, #30 TAB 2 Refills         Prov: ERIC PARIS onc         9/13/18       Metformin HCl (Glucophage) 1,000 Mg Tablet      1 TAB PO BID for 30 Days, #60 TAB 2 Refills         Prov: JULIET PARISYN onc         9/13/18       ARIPiprazole (ABILIFY) Unknown Strength Tab      PO QDAY, #30 TAB         Reported         9/7/18       Acetaminophen Es (Tylenol Extra Strength) 500 Mg Tablet      500 MG PO Q4-6H PRN for PAIN OR FEVER, #100 TAB 0 Refills         Reported         4/3/18       Cholecalciferol (Vitamin D3*) 2,000 U Tablet      2000 UNITS PO QDAY, #30 TAB 0 Refills         Reported         3/14/18       Biotin (Biotin) 2,500 Mcg Cap      1000 MCG PO QDAY, #30 CAP         Reported         3/14/18       diphenhydrAMINE HCl (BENADRYL) 25 Mg Capsule      25 MG PO Q4-6H PRN for ALLERGIES, #100 TAB         Reported         3/12/18       Potassium (Potassium) 99 Mg Tablet      99 MG PO TID, TAB         Reported         3/9/18       Magnesium Amino Acid Chelate (Magnesium*) 100 Mg Tablet      250 MCG PO QDAY, TAB         Reported         3/9/18       Insulin Asp/Prt/Insulin Aspart (NovoLOG Mix 70/30 VIAL*) 100 Units/Ml Vial      25 UNITS SUBQ BID INSULIN, #1 VIAL 0 Refills         Reported         3/30/17       Cetirizine Hcl (ZyrTEC) 10 Mg Tablet      10 MG PO QDAY, TAB         Reported         5/6/14      Medications Reviewed:  No Changes made to meds            History and Present Illness      Past Oncology Illness History      This is a very pleasant 62-year-old female with newly diagnosed breast cancer     and a kidney mass.             -February 9-2018.  Mammogram shows a suspicious 1.5 cm spiculated mass at 10:00     in the right breast.  Irregular abnormal right axillary lymph node.      -February .  Ultrasound core needle biopsy returned as invasive lobular     carcinoma.  [place] histologic score 1.  Size of largest focus 13 mm.      Ancillary studies ER +80%.  MN +70%.  Ki-67 40% HER-2/rhea negative.  Metastatic     lobular carcinoma involves lymph node.      -March 7-2018.  CT chest, abdomen, and pelvis showed a 1.8 cm x 2 sent a meter     heterogeneously enhancing exophytic lower pole of the right kidney.  There is     also a 0.35 cm noncalcified nodule in the left upper lobe.      -March 7-2018.  Bone scan showed no abnormal skeletal uptake.  Multifocal focal     degenerative uptake      -March 8-2018.  There is a 2.6 x 4 x 3.4 cm enhancing mass was spiculated     worsened compatible with no invasive lobular carcinoma.  There are 2 right     axillary lymph nodes are enlarged.      -March -May 2018.  Completed 4 cycles of TC      -July .  Patient had a right sentinel node and right lumpectomy.      Pathology revealed invasive lobular carcinoma.  Negative margins.  Number of     lymph nodes with macro metastases 2.  ER 70%.  MN 80%.  Ki-67 2%.  Tumor size 3     cm.      -September 13, 2018. Presents for follow up for breast cancer. Patient has had 2    radiation consults and has not been to either consult  secondary to poor     transportation to see Dr. Aguiar. Discussed with patient regarding the     importance of possible radiation to the breast. Breast exam completed and did     palpate a lump in the right upper breast about the 10:00 to 12:00 position.     Patient reports she has felt an area to same location. She is looking for new     PCP since hers has left [facility].Requests home meds be filled until she     finds new PCP. Did have surgery for right tumor to the right kidney which she     states was  benign. Has follow up appointment with Dr. Mcintosh today.            HPI - Oncology Interim      Presents for follow up for breast cancer.  Patient currently taking radiation     treatments, currently #10 of 30.  Tolerating well, but patient reports increased    heart rate and decreased appetite since starting radiation.            ECOG Performance Status      1            PAST, FAMILY   Past Medical History      Past Medical History:  No Diabetes Type 1; Diabetes Type 2, Thyroid Disease; No     COPD, No Emphysema, No Hypertension, No Stroke, No High Cholesterol, No Heart     Attack, No Bleeding Condition, No Low or High RBC Count, No Low or High WBC     Count, No Low or High Platelet Coun, No Hepatitis, No Kidney Disease, No     Depression, No Alzheimer's Disease, No Mental Disease, No Seizures, No     Arthritis, No Osteoporosis, No Osteopenia, No Short of Air, No Sleep apnea, No     Liver Disease, No STD, No Enlarged Prostate; Other (INTERSISTAL CYSTITIS/HIATAL     HERNIA)      Hematology/oncology:  DENIES HX OF: Previous Treatment for CA, Anemia, Bladder C    ancer, Blood cancer, Brain cancer, Breast cancer, Cervical cancer, Coagulopathy,    Colorectal cancer, Endocrine cancer, Eye cancer, GI cancer,  cancer, Kidney     cancer, Leukemia, Leukocytosis, Leukopenia, Liver cancer, Lung cancer, Lymphoma,    Musculoskeletal cancer, Myeloma, Neurologic cancer, Oral cancer, Ovarian cancer,    Skin cancer, Stomach cancer, Thrombocytopenia, Thyroid cancer, Uterine cancer,     Other cancer history, Other hematologic history      Genetic/metabolic:  DENIES HX OF: Cystic fibrosis, Down syndrome, Other genetic     history, Other metabolic history            Past Surgical History      DENIES HX OF: Cataract extraction, Thyroid surgery, Lung biopsy, CABG surgery,     Coronary stent, Valve replacement, Appendectomy, Cholecystectomy, Splenectomy,     Bladder surgery, Nephrectomy, Joint replacement, Frature repair, Skin cancer      removal, Melanoma excision, Spinal surgery, Breast biopsy, Lumpectomy,     Mastectomy, bilateral, Mastectomy, right, Mastectomy, left, Hysterectomy, Peg     Tube Placement, VAD Placement, Biopsy, Other Past Surgical Hx            Family History      REPORTS HX OF: Colorectal cancer (MATERNAL GRANDMOTHER), Leukemia (MOTHER);     DENIES HX OF: Anemia, Blood disorders, Blood Cancer, Breast cancer, Cervical     cancer, Coagulopathy, Endocrine Cancer, Eye Cancer, GI Cancer,  Cancer, Kidney    Cancer, Leukocytosis, Leukopenia, Liver Cancer, Lung cancer, Lymphoma, Melanoma,    Musculoskeletal Cancer, Myeloma, Neurologic Cancer, Oral Cancer, Ovarian cancer,    Prostate cancer, Skin Cancer, Stomach Cancer, Testicular Cancer,     Thrombocytopenia, Thyroid cancer, Uterine cancer, Other Cancer History, Other     Hematology History            Social History      Lives independently:  Yes            Tobacco Use      Tobacco status:  Never smoker            Alcohol Use      Alcohol intake:  None            Substance Use      Substance use:  Denies use            REVIEW OF SYSTEMS      General:  Denies: Appetite change, Excessive sweating, Fatigue, Fever, Night     sweats, Weight gain, Weight loss, Other      Eyes:  Denies: Blurred vision, Corrective lenses, Diplopia, Eye irritation, Eye     pain, Eye redness, Spots in vision, Vision loss, Other      Ears, nose, mouth, throat:  Denies: Headache, Seizures, Visual Changes, Hearing     loss, Sinus Congestion, Hoarseness, Sore throat, Other      Cardiovascular:  Denies: Chest pain, Irregular heartbeat, Palpitations, Swollen     ankles/legs, Other      Respiratory:  Denies: Chest pain, Shortness of Air, Productive cough, Coughing     blood, Other      Gastrointestinal:  Denies: Nausea, Vomiting, Problem swallowing, Frequent     heartburn, Constipation, Diarrhea, Tarry stools, Bloody stools, Unable to     control bowels, Other      Kidney/Bladder:  Denies: Painful Urination, Change  in urinary stream, Blood in     urine, Incontinence, Frequent Urination, Decreased urine stream, Other      Musculoskeletal:  Denies: New Back pain, Leg Cramps, Painful Joints, Swollen     Joints, Muscle Pain, Muscle weakness, Other      Skin:  DENIES: Jaundice, Easy Bleeding, Lesions/changes in moles, Nail changes,     Skin Discoloration, Rash, Other      Neurological:  Denies: Dizziness, Fainting, Numbness\Tingling, Paralysis,     Seizures, Other      Psychiatric:  Complains of: AAO X 3; Denies: Anxiety, Panic attacks, Depression,    Memory loss, Other      Endocrine:  DiabetesThyroid DisorderOsteoporosisEndocrine Other      Hematologic/lymphatic:  Denies: Bruising, Bleeding, Enlarged Lymph Nodes,     Recurrent infections, Other      Reproductive:  Denies Pregnant, Denies Menopause, Denies Still Menstruating,     Denies Heavy Periods, Denies Other            VITAL SIGNS,PAIN/FATIGUE SCORE      Vitals      Height 5 ft 2.56 in / 158.9 cm      Weight 204 lbs 9.390 oz / 92.8 kg      BSA 1.94 m2      BMI 36.8 kg/m2      Temperature 98.1 F / 36.72 C - Temporal      Pulse 110      Blood Pressure 127/67 Sitting, Left Arm      Pulse Oximetry 100%, ROOM AIR            Pain Score      Experiencing any pain?:  No      Pain Scale Used:  Numerical      Pain Intensity:  0            Fatigue Score      Experiencing any fatigue?:  Yes      Fatigue (0-10 scale):  6            PREVENTION      Hx Influenza Vaccination:  No      Influenza Vaccine Declined:  Yes      2 or More Falls Past Year?:  No      Fall Past Year with Injury?:  No      Hx Pneumococcal Vaccination:  No      Encouraged to follow-up with:  PCP regarding preventative exams.      Chart initiated by      KIZZY NORMAN CMA            IMPRESSION/PLAN      Diagnosis      Breast cancer - C50.919            Notes      New Diagnostics      * Bone Densitometry DEXA, Month         Dx: Breast cancer - C50.919      * Hemoglobin A1c, Routine         Dx: Breast cancer - C50.919       * CBC, Routine         Dx: Breast cancer - C50.919            Plan      Patient to continue radiation treatments.  Encouraged to increase fluid intake,     and high protein diet.  Will follow up in 1 month.  Plan to start Arimidex after    completion of radiation.            Patient Education      Patient Education Provided:  Yes                 Disclaimer: Converted document may not contain table formatting or lab diagrams. Please see [employer] for the authenticated document.

## 2021-05-28 NOTE — PROGRESS NOTES
Patient: KONG FORBES     Acct: [id number]     Report: #[id number]  UNIT #: [id number]     : 1955    Encounter Date:2018  PRIMARY CARE: VILMA ZIEGLER DO  ***Signed***  --------------------------------------------------------------------------------------------------------------------  Chief Complaint      May 1, 2018      Breast Cancer      Intent of Therapy:  Curative            Vitals      Height 5 ft 2.56 in / 158.9 cm      Weight 216 lbs 4.340 oz / 98.1 kg      BSA 2.13 m2      BMI 38.9 kg/m2      Temperature 98.1 F / 36.72 C - Temporal      Pulse 100      Respirations 16      Blood Pressure 116/71 Sitting, Left Arm      Pulse Oximetry 98%, RM AIR            General Information      Provider Not Found in Lookup:  ANTOINETTE ARCE      [facility] Provider 2:  Antoinette Baig      [facility] Provider 3:  Pita Mcintosh            Allergies      Coded Allergies:             ERYTHROMYCIN BASE (Verified  Adverse Reaction, Unknown, Severe Headache, 18)           SULFA (SULFONAMIDE ANTIBIOTICS) (Verified  Adverse Reaction, Unknown, )       it does not work on her            Medications      Last Reconciled on 18 19:22 by ANNE NARVAEZ MD      Phenazopyridine Hcl (Azo Standard*) 97.5 Mg Tablet      97.5 MG PO TID, #90 TAB         Reported         18       Meth/Meblue/Sod Phos/Psal/Hyos (Uribel Capsule) 1 Each Capsule      1 EACH PO, CAP         Reported         18       OLANZapine (ZyPREXA*) 10 Mg Tablet      10 MG PO QDAY for 30 Days, #30 TAB         Prov: Anne Narvaez         18       Cranberry Ext/C/L. Sporogenes (Azo Cranberry) 1 Each Tablet      100 TAB PO Q4-6H Y for PAIN/ABDOMINAL CRAMPS, #30 TAB         Reported         18       Meth/Meblue/Sod Phos/Psal/Hyos (Uribel Capsule) 1 Each Capsule      1 EACH PO BID, CAP         Reported         18       Citalopram HBr (CeleXA) 40 Mg Tablet      40 MG PO QDAY, #30 TAB 0 Refills         Prov: Anne Narvaez          4/3/18       Lisinopril* (Lisinopril*) 5 Mg Tablet      5 MG PO QDAY, #30 TAB 0 Refills         Prov: Herbert Moody         4/3/18       Levothyroxine (Levothyroxine) 0.25 Mg Tablet      50 MCG PO QDAY@07, #30 TAB 0 Refills         Prov: Herbert Moody         4/3/18       glipiZIDE (glipiZIDE*) 5 Mg Tablet      15 MG PO BID, #90 TAB 0 Refills         Prov: Herbert Moody         4/3/18       metFORMIN HCl (Glucophage*) 1,000 Mg Tablet      1 TAB PO BID for 30 Days, #60 TAB 0 Refills         Prov: Herbert Moody         4/3/18       Mouthwash (Magic Mouthwash) 1 Each Bottle      15 ML PO Q4H Y for SORENESS, ML         Reported         4/3/18       Acetaminophen* (Tylenol Extra Strength*) 500 Mg Tablet      500 MG PO Q4-6H Y for PAIN OR FEVER, #100 TAB 0 Refills         Reported         4/3/18       Miconazole Nitrate (Miconazole 7 Vag Cream) 45 Gm Cream.appl      1 APL VAG HS, #1 TUBE         Prov: ERIC PARIS onc         3/27/18       Cholecalciferol (Vitamin D3*) 2,000 U Tablet      2000 UNITS PO QDAY, #30 TAB 0 Refills         Reported         3/14/18       Biotin (Biotin) 2,500 Mcg Cap      1000 MCG PO QDAY, #30 CAP         Reported         3/14/18       Prochlorperazine (Compazine*) 10 Mg/2 Ml Vial      10 MG IM Q6H Y for NAUSEA AND/OR VOMITING, VIAL         Reported         3/14/18       Dexamethasone (Dexamethasone) 2 Mg Tab      4 MG PO BID, TAB         Reported         3/14/18       Hydrocodone/Acetaminophen 5/325 MG (Norco 5/325 Mg) 1 Tab Tab      2 TAB PO Q4-6H Y for pain, #15 TAB 0 Refills         Prov: Melissa Baig         3/13/18       Ondansetron (Ondansetron ODT*) 8 Mg Tab.rapdis      8 MG PO Q8H Y for NAUSEA, #24 TAB.RAPDIS 3 Refills         Prov: Herbert Moody         3/13/18       diphenhydrAMINE HCl (Benadryl*) 25 Mg Capsule      25 MG PO Q4-6H Y for ALLERGIES, #100 TAB         Reported         3/12/18       Potassium (Potassium) 99 Mg Tablet      3 PO TID, TAB         Reported          3/9/18       Magnesium Amino Acid Chelate (Magnesium*) 100 Mg Tablet      250 MCG PO QDAY, TAB         Reported         3/9/18       Insulin Asp/Prt/Insulin Aspart (NovoLOG Mix 70/30 VIAL*) 100 Units/Ml Vial      20 UNITS SUBQ BID INSULIN, #1 VIAL 0 Refills         Reported         3/30/17       Cetirizine Hcl (ZyrTEC*) 10 Mg Tablet      1 TAB PO QDAY, TAB         Reported         5/6/14      Current Medications      Current Medications Reviewed 5/1/18            Pain and Fatigue Scales      Pain Assessment:           Experiencing any pain?:  Yes         Pain Scale Used:  Numerical         Pain Intensity:  3      Fatigue:           Experiencing any fatigue?:  Yes         Fatigue (0-10 scale):  4            Chemo Status      On Oral Chemotherapy?:  No            PAST, FAMILY   Past Medical History      No Diabetes Type 1, Yes Diabetes Type 2, Yes Thyroid Disease, No COPD, No     Emphysema, No Hypertension, No Stroke, No High Cholesterol, No Heart Attack, No     Bleeding Condition, No Low or High RBC Count, No Low or High WBC Count, No Low     or High Platelet Coun, No Hepatitis, No Kidney Disease, No Depression, No     Alzheimer's Disease, No Mental Disease, No Seizures, No Arthritis, No     Osteoporosis, No Osteopenia, No Short of Air, No Sleep apnea, No Liver Disease,     No STD, No Enlarged Prostate, Yes Other (INTERSISTAL CYSTITIS/HIATAL HERNIA)      Hematology/oncology:  DENIES HX OF: Previous Treatment for CA, Anemia, Bladder     Cancer, Blood cancer, Brain cancer, Breast cancer, Cervical cancer, Coagulopathy    , Colorectal cancer, Endocrine cancer, Eye cancer, GI cancer,  cancer, Kidney     cancer, Leukemia, Leukocytosis, Leukopenia, Liver cancer, Lung cancer, Lymphoma    , Musculoskeletal cancer, Myeloma, Neurologic cancer, Oral cancer, Ovarian     cancer, Skin cancer, Stomach cancer, Thrombocytopenia, Thyroid cancer, Uterine     cancer, Other cancer history, Other hematologic history       Genetic/metabolic:  DENIES HX OF: Cystic fibrosis, Down syndrome, Other genetic     history, Other metabolic history            Past Surgical History      DENIES HX OF: Cataract extraction, Thyroid surgery, Lung biopsy, CABG surgery,     Coronary stent, Valve replacement, Appendectomy, Cholecystectomy, Splenectomy,     Bladder surgery, Nephrectomy, Joint replacement, Frature repair, Skin cancer     removal, Melanoma excision, Spinal surgery, Breast biopsy, Lumpectomy,     Mastectomy, bilateral, Mastectomy, right, Mastectomy, left, Hysterectomy, Peg     Tube Placement, VAD Placement, Biopsy, Other Past Surgical Hx            Family History      REPORTS HX OF: Colorectal cancer (MATERNAL GRANDMOTHER), Leukemia (MOTHER),     DENIES HX OF: Anemia, Blood disorders, Blood Cancer, Breast cancer, Cervical     cancer, Coagulopathy, Endocrine Cancer, Eye Cancer, GI Cancer,  Cancer,     Kidney Cancer, Leukocytosis, Leukopenia, Liver Cancer, Lung cancer, Lymphoma,     Melanoma, Musculoskeletal Cancer, Myeloma, Neurologic Cancer, Oral Cancer,     Ovarian cancer, Prostate cancer, Skin Cancer, Stomach Cancer, Testicular Cancer    , Thrombocytopenia, Thyroid cancer, Uterine cancer, Other Cancer History, Other     Hematology History            Social History      Yes            Tobacco Use      Tobacco status:  Never smoker            Alcohol Use      Alcohol intake:  None            Substance Use      Substance use:  Denies use            Past Oncology Illness History      This is a very pleasant 62-year-old female with newly diagnosed breast cancer     and a kidney mass.            -February 9-2018.  Mammogram shows a suspicious 1.57 m spiculated mass at 10:00     in the right breast.  Irregular abnormal right axillary lymph node.      -February .  Ultrasound core needle biopsy returned as invasive lobular     carcinoma.  [place] histologic score 1.  Size of largest focus 13 mm.      Ancillary studies ER +80%.  OR  "+70%.  Ki-67 40% HER-2/rhea negative.  Metastatic     lobular carcinoma involves lymph node.      -March 7-2018.  CT chest, abdomen, and pelvis showed a 1.8 cm x 2 sent a meter     heterogeneously enhancing exophytic lower pole of the right kidney.  There is     also a 0.35 cm noncalcified nodule in the left upper lobe.      -March 7-2018.  Bone scan showed no abnormal skeletal uptake.  Multifocal focal     degenerative uptake      -March 8-2018.  There is a 2.6 x 4 x 3.4 cm enhancing mass was spiculated     worsened compatible with no invasive lobular carcinoma.  There are 2 right     axillary lymph nodes are enlarged.      -March .  Cycle 1 day 1 Docetaxel Cytoxan      -April 10, 2018-Cycle 2 day 1 Docetaxel         REVIEW OF SYSTEMS      General:  Denies: Appetite change, Excessive sweating, Fatigue, Fever, Night     sweats, Weight gain, Weight loss, Other      Eyes:  Denies: Blurred vision, Corrective lenses, Diplopia, Eye irritation, Eye     pain, Eye redness, Spots in vision, Vision loss, Other      Ears, nose, mouth, throat:  Denies: Headache, Seizures, Visual Changes, Hearing     loss, Sinus Congestion, Hoarseness, Sore throat, Other      Cardiovascular:  Denies: Chest pain, Irregular heartbeat, Palpitations, Swollen     ankles/legs, Other      Respiratory:  Denies: Chest pain, Shortness of Air, Productive cough, Coughing     blood, Other      Gastrointestinal:  Denies: Nausea, Vomiting, Problem swallowing, Frequent     heartburn, Constipation, Diarrhea, Tarry stools, Bloody stools, Unable to     control bowels, Other      Kidney/Bladder:  Denies: Painful Urination, Change in urinary stream, Blood in     urine, Incontinence, Frequent Urination, Decreased urine stream, Other      Musculoskeletal:  Denies: New Back pain, Leg Cramps, Painful Joints, Swollen     Joints, Muscle Pain, Muscle weakness, Other      Skin:  COMPLAINS OF: Other (\"DRY SKIN\"), DENIES: Jaundice, Easy Bleeding,     Lesions/changes in " moles, Nail changes, Skin Discoloration, Rash      Neurological:  Denies: Dizziness, Fainting, Numbness\Tingling, Paralysis,     Seizures, Other      Psychiatric:  Complains of: AAO X 3, Denies: Anxiety, Panic attacks, Depression    , Memory loss, Other      Endocrine:  DiabetesThyroid DisorderOsteoporosisEndocrine Other      Hematologic/lymphatic:  Denies: Bruising, Bleeding, Enlarged Lymph Nodes,     Recurrent infections, Other      Reproductive:  Denies Pregnant, Denies Menopause, Denies Still Menstruating,     Denies Heavy Periods, Denies Other            EXAM      Vitals            Vital Signs              Date Time  Temp Pulse Resp B/P (MAP) Pulse Ox O2 Delivery O2 Flow Rate FiO2             4/24/18 10:29 98.7 103  110/68 99 ROOM AIR               3/20/18 09:15   20                 Lab Results      Laboratory Tests      4/24/18 11:11            Notes      New Medications      * Meth/Meblue/Sod Phos/Psal/Hyos (Uribel Capsule) 1 EACH CAPSULE: 1 EACH PO      * PHENAZOPYRIDINE HCL (Azo Standard*) 97.5 MG TABLET: 97.5 MG PO TID #90      Intensive Monitor for Toxicity:  Labs Reviewed, Chemo Orders Reviewd, Meds\    Narcotics Reviewed      Labs Reviewed During Visit?:  Yes      Time Spent:  > 50% /[facility]      Patient Education Provided:  Yes      ECOG Score:  0            PREVENTION      Hx Influenza Vaccination:  No      Influenza Vaccine Declined:  Yes      2 or More Falls Past Year?:  No      Fall Past Year with Injury?:  No      Hx Pneumococcal Vaccination:  No      Encouraged to follow-up with:  PCP regarding preventative exams.      Chart initiated by      SYDNEE CHANDLER MA                 Disclaimer: Converted document may not contain table formatting or lab diagrams. Please see [employer] for the authenticated document.

## 2021-05-28 NOTE — PROGRESS NOTES
Patient: KONG FORBES     Acct: [id number]     Report: #[id number]  UNIT #: [id number]     : 1955    Encounter Date:05/15/2018  PRIMARY CARE: VILMA ZIEGLER DO  ***Signed***  --------------------------------------------------------------------------------------------------------------------  Chief Complaint      May 15, 2018      BREAST CANCER      Breast Cancer      Intent of Therapy?:  Curative            Current Plan      -Lobular breast cancer      -T2 N1 M0.  Stage IIB. Right, upper, outer quadrant with 2 axillary nodes.        -CT chest, abdomen, and pelvis and bone scan was done.  No metastatic disease.      As an incidental finding of a 1.8 x 2 cm mass in the lower right renal pole.      It is also a 0.35 cm noncalcified nodule.  This is too small to characterize.      Radiologist recommended a CT follow-up in 12 months      -Patient was given a copy of the national cancer center network guidelines.  If     patient remains a locally advanced stage we discussed treatment options     including neoadjuvant versus adjuvant chemotherapy.  After discussion of risk     and benefits we have agreed for neoadjuvant chemotherapy.      -Patient will proceed with neoadjuvant chemotherapy.  Afterwards patient will     have surgery with Dr. Melissa Baig.            -Renal mass      -There is a 1.8 x 2 cm renal mass noted on CT abdomen pelvis on .      -Orders placed to follow-up with Dr. Mcintosh for further evaluation.  Appreciate     recommendations      -Patient will have surgery after chemotherapy.            -Today's Interim Plan      -Orders placed to see a genetic counselor due to likely to cancers.  Renal     cancer and breast cancer      -Proceed with chemotherapy for May 22.       -MRI scheduled for .       -Surgical consult placed for mastectomy with Dr. Baig after MRI complete.       Patient will need to have surgery for renal mass and breast mass consideration     same day  of surgery with Dr. Mcintosh for renal mass.       -Physical therapy for masssage therapy if approved by insurance.       -Miralax, Senokot S and Maxzide sent to pharmacy.       -Patient will follow up next week for last cycle.             Medical Evaluation of Acute and Chronic Medical Conditions today      -Hyperglycemia      -Glucose 189 prior to chemotherapy      -Significant history of insulin-dependent diabetes      -Patient reports blood sugars will spike to 300-400 with pre-treatment steroids     and chemotherapy treatment.        -Patient encouraged to discuss insulin coverage during treatment to manage     elevated blood sugar.      -Encouraged to exercise to promote lower blood sugar; as well as toussaint ongoing     fatigue.            Medical Evaluation of Chemotherapy Associated Toxicities Today      -Chemotherapy associated fatigue      -Patient's weakness has remained stable.      -Recommended Exercise.       -Continue close observation.      -Chemotherapy associated nausea      -Patient's nausea has remained stable.      -Currently on Anti-Nausea medications.       -Continue close observation.      -Chemotherapy associated anemia      -Does not require blood transfusion on today's visit.       -Transfuse blood when hemoglobin is less than 7.      -Continue close observation.      -Chemotherapy associated thrombocytopenia      -Does not require platelet transfusion on today's visit.       -Transfuse platelet when platelet count < 20,000.       -Continue close observation.      -Anxiety due to cancer      -Patient's anxiety is well controlled today.       -Continue current management.       -Today's Assessment      -ECOG 1.       -Patient's imaging exams, blood tests, physicians' notes, and any new findings     since our last visit were reviewed today to reassess patient's medical     treatment plan. .       -Old medical records were reviewed and summarized in chronological order in the     HPI today to  maintain an updated medical record.       -Patient's radiology imaging tests from our last visit were reviewed     independently by me by direct visualization of the images.        -Patients current lab tests and medications were carefully reviewed to evaluate     patient's current treatment plan today. Proceed with chemotherapy plan.       -Patient was advised to call us right away if there are any new symptoms for an     urgent visit for further evaluation. Patient voiced understanding and agreed to     do so.      Notes      New Medications      * Docusate Sod/Senna (Senokot S) 1 EACH TABLET: 1 EA PO BID 30 Days #60      * POLYETHYLENE GLYCOL (Miralax*) 17 GM PACKET: 17 GM PO HS 30 Days #30      * Triamterene/HCTZ (Maxzide 75/50 Mg*) 1 EACH TABLET: 1 TAB PO QDAY PRN     SWELLING 30 Days #30      New Diagnostics      * CBC, As Soon As Possible       Dx: Breast cancer - C50.919      * CMP Comp Metabolic Panel, Stat       Dx: Breast cancer - C50.919      * TIEN Breast w Contrast MRI, 06/05/18       Dx: Breast cancer - C50.919      Intensive Monitor for Toxicity:  Labs Reviewed, Chemo Orders Reviewd, Meds\    Narcotics Reviewed      Labs Reviewed During Visit?:  Yes      Time Spent:  > 50% /Coord Care      Patient Education Provided:  Yes      ECOG Score:  1            ECOG      ECOG Performance Status:  0            History and Present Illness      This is a very pleasant 62-year-old female with newly diagnosed breast cancer     and a kidney mass.            -February 9-2018.  Mammogram shows a suspicious 1.57 m spiculated mass at 10:00     in the right breast.  Irregular abnormal right axillary lymph node.      -February .  Ultrasound core needle biopsy returned as invasive lobular     carcinoma.  Yosvany histologic score 1.  Size of largest focus 13 mm.      Ancillary studies ER +80%.  NE +70%.  Ki-67 40% HER-2/rhea negative.  Metastatic     lobular carcinoma involves lymph node.      -March 7-2018.   CT chest, abdomen, and pelvis showed a 1.8 cm x 2 sent a meter     heterogeneously enhancing exophytic lower pole of the right kidney.  There is     also a 0.35 cm noncalcified nodule in the left upper lobe.      -March 7-2018.  Bone scan showed no abnormal skeletal uptake.  Multifocal focal     degenerative uptake      -March 8-2018.  There is a 2.6 x 4 x 3.4 cm enhancing mass was spiculated     worsened compatible with no invasive lobular carcinoma.  There are 2 right     axillary lymph nodes are enlarged.      -March .  Cycle 1 day 1 Docetaxel Cytoxan      -April 10, 2018-Cycle 2 day 1 Docetaxel   -May 1-2018. Cycle 3 Day 1 Docetaxel   -May 15, 2018. Presents for follow up for toxicity check. Complaints of     bilateral leg swelling. Decreased salt and elevated legs last week. Having     increased constipation.Taking Senokot S. Will add Miralax. Inquiring about     massage. Next treatment scheduled for May 22 with last cycle.            Vitals      Height 5 ft 2.56 in / 158.9 cm      Weight 218 lbs 0.559 oz / 98.9 kg      BSA 2.14 m2      BMI 39.2 kg/m2      Temperature 98.3 F / 36.83 C - Temporal      Pulse 93      Blood Pressure 122/67 Sitting, Left Arm      Pulse Oximetry 98%, ROOM AIR            Allergies      Coded Allergies:             ERYTHROMYCIN BASE (Verified  Adverse Reaction, Unknown, Severe Headache, 5/    15/18)           SULFA (SULFONAMIDE ANTIBIOTICS) (Verified  Adverse Reaction, Unknown, 5/15/    18)       it does not work on her            Medications      Last Reconciled on 5/2/18 18:57 by NANE NARVAEZ MD      Triamterene/HCTZ (Maxzide 75/50 Mg*) 1 Each Tablet      1 TAB PO QDAY Y for SWELLING for 30 Days, #30 TAB 0 Refills         Prov: ERIC PARIS onc         5/15/18       Polyethylene Glycol (Miralax*) 17 Gm Packet      17 GM PO HS for 30 Days, #30 PKT 0 Refills         Prov: ERIC PARIS onc         5/15/18       Docusate Sod/Senna (Senokot S) 1 Each Tablet      1  EA PO BID for 30 Days, #60 TAB 2 Refills         Prov: ERIC PARIS onc         5/15/18       glipiZIDE (glipiZIDE*) 5 Mg Tablet      15 MG PO BID, #180 TAB 1 Refill         Prov: Herbert Moody         5/9/18       Phenazopyridine Hcl (Azo Standard*) 97.5 Mg Tablet      97.5 MG PO TID, #90 TAB         Reported         5/1/18       Meth/Meblue/Sod Phos/Psal/Hyos (Uribel Capsule) 1 Each Capsule      1 EACH PO, CAP         Reported         5/1/18       OLANZapine (ZyPREXA*) 10 Mg Tablet      10 MG PO QDAY for 30 Days, #30 TAB         Prov: Herbert Moody         4/24/18       Cranberry Ext/C/L. Sporogenes (Azo Cranberry) 1 Each Tablet      100 TAB PO Q4-6H Y for PAIN/ABDOMINAL CRAMPS, #30 TAB         Reported         4/24/18       Meth/Meblue/Sod Phos/Psal/Hyos (Uribel Capsule) 1 Each Capsule      1 EACH PO BID, CAP         Reported         4/24/18       Citalopram HBr (CeleXA) 40 Mg Tablet      40 MG PO QDAY, #30 TAB 0 Refills         Prov: Herbert Moody         4/3/18       Lisinopril* (Lisinopril*) 5 Mg Tablet      5 MG PO QDAY, #30 TAB 0 Refills         Prov: Herbert Moody         4/3/18       Levothyroxine (Levothyroxine) 0.25 Mg Tablet      50 MCG PO QDAY@07, #30 TAB 0 Refills         Prov: Herbert Moody         4/3/18       glipiZIDE (glipiZIDE*) 5 Mg Tablet      15 MG PO BID, #90 TAB 0 Refills         Prov: Herbert Moody         4/3/18       metFORMIN HCl (Glucophage*) 1,000 Mg Tablet      1 TAB PO BID for 30 Days, #60 TAB 0 Refills         Prov: Herbert Moody         4/3/18       Mouthwash (Magic Mouthwash) 1 Each Bottle      15 ML PO Q4H Y for SORENESS, ML         Reported         4/3/18       Acetaminophen* (Tylenol Extra Strength*) 500 Mg Tablet      500 MG PO Q4-6H Y for PAIN OR FEVER, #100 TAB 0 Refills         Reported         4/3/18       Miconazole Nitrate (Miconazole 7 Vag Cream) 45 Gm Cream.appl      1 APL VAG HS, #1 TUBE         Prov: ERIC PARIS onc         3/27/18       Cholecalciferol  (Vitamin D3*) 2,000 U Tablet      2000 UNITS PO QDAY, #30 TAB 0 Refills         Reported         3/14/18       Biotin (Biotin) 2,500 Mcg Cap      1000 MCG PO QDAY, #30 CAP         Reported         3/14/18       Prochlorperazine (Compazine*) 10 Mg/2 Ml Vial      10 MG IM Q6H Y for NAUSEA AND/OR VOMITING, VIAL         Reported         3/14/18       Dexamethasone (Dexamethasone) 2 Mg Tab      4 MG PO BID, TAB         Reported         3/14/18       Hydrocodone/Acetaminophen 5/325 MG (Norco 5/325 Mg) 1 Tab Tab      2 TAB PO Q4-6H Y for pain, #15 TAB 0 Refills         Prov: Antoinette Baig         3/13/18       Ondansetron (Ondansetron ODT*) 8 Mg Tab.rapdis      8 MG PO Q8H Y for NAUSEA, #24 TAB.RAPDIS 3 Refills         Prov: Herbert Moody         3/13/18       diphenhydrAMINE HCl (Benadryl*) 25 Mg Capsule      25 MG PO Q4-6H Y for ALLERGIES, #100 TAB         Reported         3/12/18       Potassium (Potassium) 99 Mg Tablet      3 PO TID, TAB         Reported         3/9/18       Magnesium Amino Acid Chelate (Magnesium*) 100 Mg Tablet      250 MCG PO QDAY, TAB         Reported         3/9/18       Insulin Asp/Prt/Insulin Aspart (NovoLOG Mix 70/30 VIAL*) 100 Units/Ml Vial      20 UNITS SUBQ BID INSULIN, #1 VIAL 0 Refills         Reported         3/30/17       Cetirizine Hcl (ZyrTEC*) 10 Mg Tablet      1 TAB PO QDAY, TAB         Reported         5/6/14            General Information      Provider Not Found in Lookup:  ANTOINETTE ARCE      [facility] Provider 2:  Antoinette Baig      [facility] Provider 3:  Pita Mcintosh            Pain and Fatigue Scales      Pain Assessment:           Experiencing any pain?:  Yes         Pain Scale Used:  Numerical         Pain Intensity:  6         Pain Location:  Unknown (ALL OVER)         Description:  Aching         Duration:  Continuous            Chemo Status      On Oral Chemotherapy?:  No            PAST, FAMILY   Past Medical History      Past Medical History:  No Diabetes Type 1, Yes Diabetes Type  2, Yes Thyroid     Disease, No COPD, No Emphysema, No Hypertension, No Stroke, No High Cholesterol    , No Heart Attack, No Bleeding Condition, No Low or High RBC Count, No Low or     High WBC Count, No Low or High Platelet Coun, No Hepatitis, No Kidney Disease,     No Depression, No Alzheimer's Disease, No Mental Disease, No Seizures, No     Arthritis, No Osteoporosis, No Osteopenia, No Short of Air, No Sleep apnea, No     Liver Disease, No STD, No Enlarged Prostate, Yes Other (INTERSISTAL CYSTITIS/    HIATAL HERNIA)      Hematology/oncology:  DENIES HX OF: Previous Treatment for CA, Anemia, Bladder     Cancer, Blood cancer, Brain cancer, Breast cancer, Cervical cancer, Coagulopathy    , Colorectal cancer, Endocrine cancer, Eye cancer, GI cancer,  cancer, Kidney     cancer, Leukemia, Leukocytosis, Leukopenia, Liver cancer, Lung cancer, Lymphoma    , Musculoskeletal cancer, Myeloma, Neurologic cancer, Oral cancer, Ovarian     cancer, Skin cancer, Stomach cancer, Thrombocytopenia, Thyroid cancer, Uterine     cancer, Other cancer history, Other hematologic history      Genetic/metabolic:  DENIES HX OF: Cystic fibrosis, Down syndrome, Other genetic     history, Other metabolic history            Past Surgical History      DENIES HX OF: Cataract extraction, Thyroid surgery, Lung biopsy, CABG surgery,     Coronary stent, Valve replacement, Appendectomy, Cholecystectomy, Splenectomy,     Bladder surgery, Nephrectomy, Joint replacement, Frature repair, Skin cancer     removal, Melanoma excision, Spinal surgery, Breast biopsy, Lumpectomy,     Mastectomy, bilateral, Mastectomy, right, Mastectomy, left, Hysterectomy, Peg     Tube Placement, VAD Placement, Biopsy, Other Past Surgical Hx            Family History      REPORTS HX OF: Colorectal cancer (MATERNAL GRANDMOTHER), Leukemia (MOTHER),     DENIES HX OF: Anemia, Blood disorders, Blood Cancer, Breast cancer, Cervical     cancer, Coagulopathy, Endocrine Cancer, Eye  Cancer, GI Cancer,  Cancer,     Kidney Cancer, Leukocytosis, Leukopenia, Liver Cancer, Lung cancer, Lymphoma,     Melanoma, Musculoskeletal Cancer, Myeloma, Neurologic Cancer, Oral Cancer,     Ovarian cancer, Prostate cancer, Skin Cancer, Stomach Cancer, Testicular Cancer    , Thrombocytopenia, Thyroid cancer, Uterine cancer, Other Cancer History, Other     Hematology History            Social History      Lives independently:  Yes            Tobacco Use      Tobacco status:  Never smoker            Alcohol Use      Alcohol intake:  None            Substance Use      Substance use:  Denies use            REVIEW OF SYSTEMS      General:  Complains of: Fatigue, Denies: Appetite change, Excessive sweating,     Fever, Night sweats, Weight gain, Weight loss, Other      Eyes:  Denies: Blurred vision, Corrective lenses, Diplopia, Eye irritation, Eye     pain, Eye redness, Spots in vision, Vision loss, Other      Ears, nose, mouth, throat:  Denies: Headache, Seizures, Visual Changes, Hearing     loss, Sinus Congestion, Hoarseness, Sore throat, Other      Cardiovascular:  Complains of: Swollen ankles/legs, Denies: Chest pain,     Irregular heartbeat, Palpitations, Other      Respiratory:  Denies: Chest pain, Shortness of Air, Productive cough, Coughing     blood, Other      Gastrointestinal:  Denies: Nausea, Vomiting, Problem swallowing, Frequent     heartburn, Constipation, Diarrhea, Tarry stools, Bloody stools, Unable to     control bowels, Other      Kidney/Bladder:  Denies: Painful Urination, Change in urinary stream, Blood in     urine, Incontinence, Frequent Urination, Decreased urine stream, Other      Musculoskeletal:  Denies: New Back pain, Leg Cramps, Painful Joints, Swollen     Joints, Muscle Pain, Muscle weakness, Other      Skin:  COMPLAINS OF: Nail changes, DENIES: Jaundice, Easy Bleeding, Lesions/    changes in moles, Skin Discoloration, Rash, Other      Neurological:  Complains of: Numbness\Tingling,  Denies: Dizziness, Fainting,     Paralysis, Seizures, Other      Psychiatric:  Complains of: AAO X 3, Denies: Anxiety, Panic attacks, Depression    , Memory loss, Other      Endocrine:  DiabetesThyroid DisorderOsteoporosisEndocrine Other      Hematologic/lymphatic:  Denies: Bruising, Bleeding, Enlarged Lymph Nodes,     Recurrent infections, Other      Reproductive:  Complains of Menopause, Denies Pregnant, Denies Still     Menstruating, Denies Heavy Periods, Denies Other            Vitals            Vital Signs              Date Time  Temp Pulse Resp B/P (MAP) Pulse Ox O2 Delivery O2 Flow Rate FiO2             5/1/18 09:24 98.1 100 16 116/71 98 RM AIR              General Appearance:  Alert, Oriented X3, Cooperative, No acute distress      Eyes:  Anicteric Sclerae, Moist Conjunctiva      HEENT:  Orophraynx clear, No Erythema, No Pallor, No Thrush      Neck:  Supple, Full ROM, No Masses or JVD      Respiratory:  CTAB, No Diminished Breath, No Rales, No Crackels, No Rhonchi      Breast\Chest:  Symmetrical      Abdomen\Gastro:  Soft, No Masses, No Hepatosplenomegaly      Cardio:  RRR, No Murmur, No, Peripheral Edema      Skin:  Normal Temperature, Normal Tone, Normal Texture and Turgor, No Rash,     lesions, ulcers      Psychiatric:  Appropriate Affect, Intact Judgement, AAO x3      Neuro:  Cranial Nerve II-XII Inta, No Focal Sensory Deficit      Genitourinary:  No Mazariegos Catheter      Muscularskeletal:  Normal Gait and Station, Full ROM of extremeties, Full     muscle strength\tone      Extremities:  No Digital Cyanosis, No Digital Ischemia      Lymphatic:  No Cervical, No Supraclavicular, No Infraclavicular, No Axillary,     No Inguinal            Lab Results      Laboratory Tests      5/1/18 09:00            PREVENTION      Hx Influenza Vaccination:  No      Influenza Vaccine Declined:  Yes      2 or More Falls Past Year?:  No      Fall Past Year with Injury?:  No      Hx Pneumococcal Vaccination:  No      Encouraged  to follow-up with:  PCP regarding preventative exams.      Chart initiated by      KIZZY NORMAN CMA                 Disclaimer: Converted document may not contain table formatting or lab diagrams. Please see [employer] for the authenticated document.

## 2021-05-28 NOTE — PROGRESS NOTES
Patient: KONG FORBES     Acct: [id number]     Report: #[id number]  UNIT #: [id number]     : 1955    Encounter Date:03/10/2021  PRIMARY CARE: VILMA ZIEGLER DO  ***Signed***  --------------------------------------------------------------------------------------------------------------------  NURSE INTAKE      Visit Type      Established Patient Visit            Chief Complaint      hospital f/u      Intent of Therapy:  Palliative            History and Present Illness      Past Oncology Illness History      Ms. Forbes is a very pleasant 65-year-old WF with newly diagnosed breast     cancer and a kidney mass.  .  Ultrasound core needle biopsy     returned as invasive lobular carcinoma.  [place] histologic score 1.  Size of    largest focus 13 mm.  Ancillary studies ER +80%.  TN +70%.  Ki-67 40% HER-2/rhea     negative.  Metastatic lobular carcinoma involves lymph node.  .  CT     chest, abdomen, and pelvis showed a 1.8 cm x 2 sent a meter heterogeneously     enhancing exophytic lower pole of the right kidney.  There is also a 0.35 cm     noncalcified nodule in the left upper lobe.  .  There is a 2.6 x 4 x    3.4 cm enhancing mass was spiculated worsened compatible with no invasive     lobular carcinoma.  There are 2 right axillary lymph nodes are enlarged.       2018. Presents for follow up for breast cancer. Patient has had 2     radiation consults and has not been to either consult  secondary to poor     transportation to see Dr. Aguiar. Discussed with patient regarding the     importance of possible radiation to the breast. Breast exam completed and did     palpate a lump in the right upper breast about the 10:00 to 12:00 position.     Patient reports she has felt an area to same location. Did have surgery for     right tumor to the right kidney which she states was benign. 3/10/21 Patient     presents to the clinic in a wheelchair  accompanied by her . She reports     that she was in [facility] and had a biopsy and fluid removed. Liver biopsy    from 2/23/21 revealed metasasis from a breast primary, ER+, AL+, Her2 -, Ki-67     positive in 30% of tumor cells            HPI - Oncology Interim      Patient presents today for follow-up of recent hospitalization here locally as     well as at [facility] in [place].  She is noted to have increasing ascites.      Paracentesis performed showed negative cytology at least initially.  She     continued to have jaundice and increased abdominal swelling.  The liver was     abnormal in appearance on CT and MRI.  Discussion with radiology was unclear     whether this represented nodular cirrhosis versus miliary metastatic disease.      Plan on discharge locally was to perform a PET scan and then biopsy of abnormal.     Before the PET scan happened, she was rehospitalized again at [facility].  She was     seen by the liver service there.  She did undergo biopsy of the liver which     showed adenocarcinoma consistent with breast primary, ER positive, AL positive,     HER-2 negative.  She was then discharged and follows up today.      She states that she has become more jaundiced over the last several days.  Her     abdomen is also more distended.  It feels tight and she has trouble eating as     she fills up quickly.  She denies any other masses or adenopathy.  She denies     pain other than the tight abdomen.  She stopped taking her anastrozole in the     hospital.            Cancer Details            Right breast invasive lobular carcinoma ER/AL+ HER2- Ki 6740%      Right kidney Oncocytoma      2/23/21  Liver biopsy revealed metasasis from a breast primary,ER+ AL+,      Her2      -, Ki-67 positive in 30% of tumor cells            Clinical Staging      Stage IIB ([id number])            Treatments      Chemotherapy      3/20/18 thru 5/2018 completed 4C TC     2/14/19 Arimidex initiated      Radiation  Therapy      4/18 thru 11/16/18 received [id number] XRT rt breast/subclavian nodes      Surgeries       July .  P right sentinel node and right lumpectomy.  Pathology revealed     invasive lobular carcinoma.  Negative margins.  Number of lymph nodes with macro    metastases 2.  ER 70%.  SC 80%.  Ki-67 2%.  Tumor size 3 cm. right robotic     partial nephrectomy per Dr. 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            Clinical Trial Participant      No            ECOG Performance Status      3            Most Recent Lab Findings            Item Value  Date Time             White Blood Count 9.75 10*3/uL 3/10/21 0926             Red Blood Count 4.30 10*6/uL 3/10/21 0926             Hemoglobin 13.2 g/dL 3/10/21 0926             Hematocrit 37.7 % 3/10/21 0926             Mean Corpuscular Volume 87.7 fL 3/10/21 0926             Mean Corpuscular Hemoglobin 30.7 pg 3/10/21 0926             Mean Corpuscular Hemoglobin Concent 35.0 3/10/21 0926             Red Cell Distribution Width 22.4 % H 3/10/21 0926             RDW Standard Deviation 72.1 fL 3/10/21 0926             Platelet Count 273 10*3/uL 3/10/21 0926             Mean Platelet Volume 11.1 fL H 3/10/21 0926             Granulocytes (%) 78.7 % 3/10/21 0926             Sodium Level 126 mmol/L L 3/10/21 0917             Potassium Level 4.6 mmol/L 3/10/21 0917             Chloride Level 92 mmol/L L 3/10/21 0917             Carbon Dioxide Level 21 mmol/L L 3/10/21 0917             Anion Gap 18 mmol/L 3/10/21 0917             Blood Urea Nitrogen 44 mg/dL H 3/10/21 0917             Creatinine 1.41 mg/dL H 3/10/21 0917             Glomerular Filtration Rate Calc 39 mL/min/{1.73_m2} L 3/10/21 0917             BUN/Creatinine Ratio 31 {ratio} H 3/10/21 0917             Glucose Level 214 mg/dL H 3/10/21 0917             Calculated Osmolality 280 3/10/21 0917             Calcium Level 11.5 mg/dL H 3/10/21 0917             Total Bilirubin 24.78 mg/dL H 3/10/21 0917              Aspartate Amino Transf (AST/SGOT) 161 U/L H 3/10/21 0917             Alanine Aminotransferase 69 U/L H 3/10/21 0917             Alkaline Phosphatase 392 U/L H 3/10/21 0917             Total Protein 6.0 g/dL L 3/10/21 0917             Albumin 3.4 g/dL L 3/10/21 0917             Globulin 2.6 g/dL 3/10/21 09             Albumin/Globulin Ratio 1.3 {ratio} L 3/10/21 09            Most Recent Imaging Findings      Patient: KONG FORBES   Acct: #[id number]   Report: #[id number]            UNIT #: [id number]    DOS: 21 1030      INSURANCE:[employer] O   ORDER #:[id number]      LOCATION:PET     : 1955            PROVIDERS      ADMITTING:     ATTENDING: CESILIA GREWAL      FAMILY:  OTHER PHYSICIAN   ORDERING:  CESILIA GREWAL         OTHER: OTHER PHYSICIAN   DICTATING:  Garth Weems MD            REQ #:[id number]   EXAM:ISKBSMIDTH - SKULL BASE-MIDTHIGH INIT fdg      REASON FOR EXAM:  C50.611      REASON FOR VISIT:  C50.611            *******Signed******         PROCEDURE:   PET CT SKULL BASE TO MID THIGH INITIAL             COMPARISON:   [facility], MR, MRI ABD W/WO MRCP WO CONTRAST,     2/15/2021, 9:53.             INDICATIONS:   C50.611             TECHNIQUE:   After obtaining the patient's consent, F-18 FDG was administered     intravenously.  A PET       scan with concurrent CT imaging was performed from the skull to the thigh with     multiplanar imaging.             RADIONUCLIDE:     10.21 MCI   F18 FDG- I.V.      LABS:                          Blood Glucose 104 mg/dl      UPTAKE TIME:        62 mins      BACKGROUND:        Mediastinum SUV maximum 2.40, liver SUV maximum 4.50             FINDINGS:         The evaluation of the head and neck soft tissues demonstrates evidence for     abnormal       radiopharmaceutical accumulation corresponding to the anterior medial aspect of     the left posterior       cranial fossa.  The SUV maximum at this location is  7.22, there appears to be an    underlying area of       abnormal decreased attenuation corresponding to the left cerebellum.  These     findings suggest the       presence of an intracranial metastatic focus.  The lesion measures approximately    1.4 cm.              The evaluation of the thoracic soft tissues demonstrates no significant focal     abnormal       radiopharmaceutical accumulation to indicate malignancy or metastatic disease.             The evaluation of the abdominal and pelvic soft tissues demonstrates evidence     for heterogeneous       radiopharmaceutical accumulation throughout the liver.  The ill-defined     scattered activity       demonstrates an SUV maximum up to 8.18.  The activity corresponds to the     appearance on prior       imaging which demonstrate heterogeneous signal throughout the liver.  The     expected physiologic       activity throughout the spleen, GI tract, and collecting system is noted.             The evaluation of the proximal appendicular and axial skeleton demonstrates no     significant focal       abnormal radiopharmaceutical accumulation to indicate osseous malignancy or     metastatic disease.       Likewise, the evaluation of the peripheral soft tissues demonstrates no     significant focal abnormal       radiopharmaceutical accumulation to indicate peripheral soft tissue malignancy     or metastatic       disease.              The review of the CT images demonstrates a markedly heterogeneous appearance     throughout the liver       which is also small in size.  The findings are consistent with hepatic     cirrhosis.  There is large       abdominal ascites.  A small right pleural effusion is seen which may be reactive    in nature.        Scarring is noted within the right upper lobe likely related to post therapeutic    changes.  A large       hiatal hernia is noted.             CONCLUSION:         1. Evidence for focal abnormal radiopharmaceutical  accumulation corresponding to    the left       cerebellum within the left posterior cranial fossa.  The findings suggest     intracranial malignancy       and may be related to metastatic disease.  Recommend correlation with MRI of the    head with       gadolinium contrast for further characterization.      2. Ill-defined heterogeneous abnormal radiopharmaceutical activity throughout     the liver.  The       activity corresponds to the markedly heterogeneous appearance throughout the abundio    er which may be       related to hepatic cirrhosis and the presence of heterogeneous fibrosis/fatty     infiltration with       areas of normal liver parenchyma and regenerating nodules.  There is an area of     somewhat increased       radiopharmaceutical accumulation in the right lobe of the liver with SUV maximum    of up to 8.18.        Changes secondary to superimposed malignancy may still be considered.  The more     focally increased       radiopharmaceutical accumulation appears to correspond to an area of decreased     attenuation at the       peripheral aspect of the right lobe of the liver seen on images 135 through 144     of the fused       images.  The corresponding area of decreased attenuation is seen roughly on     image number 125 of the       CT images.  Soft tissue sampling is recommended for further pathologic     characterization.        Percutaneous CT guided biopsy may be the most feasible modality for soft tissue     sampling for this       patient.      3. Changes of severe hepatic cirrhosis are noted with associated large abdominal    ascites.              CARMEN WEBER MD             Electronically Signed and Approved By: CARMEN WEBER MD on 3/08/2021 at 14:13                                  Until signed, this is an unconfirmed preliminary report that may contain      errors and is subject to change.                                              JONNATHAN:      D:03/08/21 1413            PAST, FAMILY    Past Medical History      Past Medical History:  Alzheimer's Disease, Depression, Diabetes Type 2,     Hypertension, Thyroid Disease      Hematology/Oncology (F):  Anemia, Breast Cancer            Past Surgical History      Lumpectomy            Family History      Family History:  Colorectal Cancer, Leukemia            Social History      Lives independently:  Yes      Number of Children:  2      Occupation:  RETIRED TEACHER            Tobacco Use      Tobacco status:  Never smoker            Substance Use      Substance use:  Denies use            REVIEW OF SYSTEMS      General:  Admits: Fatigue      Eye:  Admits Corrective Lenses      ENT:  Denies Headache      Cardiovascular:  Denies Chest Pain      Gastrointestinal:  Admits Diarrhea, Admits Nausea/Vomiting      Musculoskeletal:  Admits Muscle Pain, Admits Painful Joints      Neurologic:  Denies Dizziness      Psychiatric:  Admits Depression            VITAL SIGNS AND SCORES      Vitals      Weight 198 lbs 6.624 oz / 90 kg      Temperature 97 F / 36.11 C - Temporal      Pulse 102      Respirations 18      Blood Pressure 157/62 Sitting      Pulse Oximetry 95%, rm air            Pain Score      Pain Scale Used:  Numerical      Pain Intensity:  9            Fatigue Score      Fatigue (0-10 scale):  9            EXAM      General Appearance:  Positive for: Alert, Cooperative;          Negative for: Acute Distress      Eye:  Positive for: Moist Conjunctiva;          Negative for: Anicteric Sclerae (Marked scleral icterus)      Neck:  Positive for: Supple;          Negative for: JVD, Masses      Respiratory:  Positive for: CTAB, Normal Respiratory Effort      Abdomen/Gastro:  Positive for: Normal Active Bowel Sounds, Distention, Fluid     Wave, Soft;          Negative for: Hepatosplenomegaly, Tenderness      Cardiovascular:  Positive for: RRR;          Negative for: Gallop, Murmur, Peripheral Edema, Rub      Psychiatric:  Positive for: Appropriate Affect, Intact  Judgement      Lymphatic:  Negative for: Cervical, Infraclavicular, Supraclavicular            PREVENTION      Hx Influenza Vaccination:  No      Influenza Vaccine Declined:  Yes      2 or More Falls in Past Year?:  No      Fall Past Year with Injury?:  No      Encouraged to follow-up with:  PCP regarding preventative exams.      Chart initiated by      nika monson ma            ALLERGY/MEDS      Allergies      Coded Allergies:             ALBUTEROL (Verified  Allergy, Unknown, 3/10/21)           AZITHROMYCIN (Verified  Allergy, Unknown, 3/10/21)           ERYTHROMYCIN BASE (Verified  Adverse Reaction, Unknown, Severe Headache,     3/10/21)            Medications      Last Reconciled on 3/11/21 07:53 by CESILIA GREWAL      Exemestane (Aromasin) 25 Mg Tab      25 MG PO QDAY, #30 TAB 5 Refills         Prov: CESILIA GREWAL         3/10/21       Lactulose (LACTULOSE) 20 Gm/30 Ml Solution      15 ML PO QDAY PRN for CONSTIPATION for 14 Days, #210 ML         Prov: DARRIAN MATAMOROS         2/18/21       Furosemide (Furosemide) 20 Mg Tablet      20 MG PO QDAY for 30 Days, #30 TAB         Prov: DARRIAN MATAMOROS         2/18/21       Spironolactone (Aldactone) 25 Mg Tablet      25 MG PO QDAY for 30 Days, #30 TAB         Prov: DARRIAN MATAMOROS         2/18/21       Metformin HCl (Metformin HCl) 1,000 Mg Tablet      1000 MG PO BID         Reported         2/15/21       Levothyroxine (Levothyroxine) 0.05 Mg Tablet      0.05 MG PO QAM         Reported         2/15/21       Pantoprazole (Protonix) 40 Mg Tablet.dr      40 MG PO QDAY@07, #30 TAB 0 Refills         Reported         2/15/21       Meloxicam (Meloxicam*) 15 Mg Tablet      15 MG PO QDAY, #30 TAB 0 Refills         Reported         2/15/21       Methocarbamol (Methocarbamol*) 500 Mg Tablet      1000 MG PO QID PRN for ABDOMINAL CRAMPING, TAB         Reported         2/4/21       Insulin NPH Hum/Reg Insulin Hm (novoLIN 70-30 Vial) 100 Unit/1 Ml Vial      20 UNITS SUBQ BID, #1 VIAL          Reported         6/12/19       glipiZIDE (glipiZIDE) 5 Mg Tablet      15 MG PO BID, #180 TAB 2 Refills         Prov: ANNE NARVAEZ         11/21/18       Citalopram HBr (CeleXA) 40 Mg Tablet      40 MG PO QDAY, #30 TAB 2 Refills         Prov: ANNE NARVAEZ         11/21/18       Lisinopril* (Lisinopril*) 5 Mg Tablet      5 MG PO QDAY, #30 TAB 2 Refills         Prov: ANNE NARVAEZ         11/21/18       diphenhydrAMINE HCl (BENADRYL) 25 Mg Capsule      25 MG PO Q4-6H PRN for ALLERGIES, #100 TAB         Reported         3/12/18       Cetirizine Hcl (zyrTEC) 10 Mg Tablet      10 MG PO QDAY, TAB         Reported         5/6/14      Medications Reviewed:  No Changes made to meds            IMPRESSION/PLAN      Diagnosis      Breast cancer         Malignant neoplasm of right breast in female, estrogen receptor positive,        unspecified site of breast         Breast location: unspecified site of breast         Estrogen receptor status: positive         Patient sex: female         Laterality: right      Patient has biopsy-proven metastatic disease in the liver.  I reviewed her PET     scan which also shows possible involvement in the brain.  I discussed the case     with Dr. Callahan, hepatologist at [facility].  The liver is     diffusely involved with cancer.  She is markedly jaundiced and her bilirubin is     25 today.  Dr. Callahan did not feel that there were stenting options to help as     this is a diffuse obstructive issue not a focal one.  ECOG PS 3.  Given the very    abnormal lab work, I do not feel that she would tolerate systemic chemotherapy.     She is hormone receptor positive.  We discussed options at this point which     would include trying hormone based therapy versus hospice/palliative care     focusing on quality of life.  Clinical trials would potentially be an option but    given the elevated bilirubin, she is likely not a candidate.  She is not yet     ready for hospice.  She will need  MRI of the brain to further assess the     abnormalities noted on PET scan.  She does understand that she has very sign    ificant disease.  I will start her on exemestane 25 mg daily.  She will have     repeat lab work in 1 week or based on symptoms.            Ascites - R18.8      Likely malignant although the initial cytology was negative.  She again has     tense ascites today and is symptomatic.  Paracentesis will be arranged for     comfort            Notes      New Medications      * EXEMESTANE (Aromasin) 25 MG TAB: 25 MG PO QDAY #30         Instructions: Take after a meal.      Discontinued Medications      * Anastrozole 1 MG TABLET: 1 MG PO QDAY 90 Days #90      New Diagnostics      * Brain W/WO Cont MRI, As Soon As Possible         Dx: Breast cancer - C50.919      * CCC CBC With Auto Diff, 03/10/21         Dx: Breast cancer - C50.919      * CCC Comp Metabolic Panel, 03/10/21         Dx: Breast cancer - C50.919      * US Guided Paracentesis, As Soon As Possible         Dx: Jaundice - R17      * CCC CBC With Auto Diff, Week         Dx: Jaundice - R17      * CCC Comp Metabolic Panel, Week         Dx: Jaundice - R17            Patient Education      Patient Education Provided:  Yes            Electronically signed by CESILIA GREWAL  03/11/2021 07:54       Disclaimer: Converted document may not contain table formatting or lab diagrams. Please see [employer] for the authenticated document.

## 2021-05-28 NOTE — PROGRESS NOTES
Patient: KONG PATEL     Acct: [id number]     Report: #[id number]  UNIT #: [id number]     : 1955    Encounter Date:2019  PRIMARY CARE: VILMA ZIEGLER DO  ***Signed***  --------------------------------------------------------------------------------------------------------------------  NURSE INTAKE      Visit Type      Established Patient Visit            Chief Complaint      (R) BREAST CA      Intent of Therapy:  Curative            Referring Provider/Copies To      Referring Provider:  Pita Mcintosh      Provider Not Found in Lookup:  ANTOINETTE ARCE      PCP Not Found in Lookup:  ANTOINETTE [facility]            History and Present Illness      Past Oncology Illness History      Ms. Patel is a very pleasant 62-year-old WF with newly diagnosed breast     cancer and a kidney mass.  .  Mammogram shows a suspicious 1.5 cm    spiculated mass at 10:00 in the right breast.  Irregular abnormal right axillary    lymph node.  .  Ultrasound core needle biopsy returned as     invasive lobular carcinoma.  Soper histologic score 1.  Size of largest     focus 13 mm.  Ancillary studies ER +80%.  RI +70%.  Ki-67 40% HER-2/rhea     negative.  Metastatic lobular carcinoma involves lymph node.  .  CT     chest, abdomen, and pelvis showed a 1.8 cm x 2 sent a meter heterogeneously     enhancing exophytic lower pole of the right kidney.  There is also a 0.35 cm     noncalcified nodule in the left upper lobe.  .  Bone scan showed no     abnormal skeletal uptake.  Multifocal focal degenerative uptake. .      There is a 2.6 x 4 x 3.4 cm enhancing mass was spiculated worsened compatible     with no invasive lobular carcinoma.  There are 2 right axillary lymph nodes are     enlarged.  .  Patient had a right sentinel node and right     lumpectomy.  Pathology revealed invasive lobular carcinoma.  Negative margins.      Number  of lymph nodes with macro metastases 2.  ER 70%.  OH 80%.  Ki-67 2%.      Tumor size 3 cm. The patient also underwent right robotic partial nephrectomy     per Dr. 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  September 13, 2018. Presents for follow     up for breast cancer. Patient has had 2 radiation consults and has not been to     either consult  secondary to poor transportation to see Dr. Aguiar. Discussed     with patient regarding the importance of possible radiation to the breast.     Breast exam completed and did palpate a lump in the right upper breast about the    10:00 to 12:00 position. Patient reports she has felt an area to same location.     She is looking for new PCP since hers has left [facility].Requests home     meds be filled until she finds new PCP. Did have surgery for right tumor to the     right kidney which she states was benign. Has follow up appointment with Dr. Mcintosh today. November 7-2018 DEXA scan was normal.            HPI - Oncology Interim      F/u rt breast ca-tolerating Anastrazole daily w/o issue-denies pain rt     breast-only with reaching for high or far objects with rt arm does she get     tightening of axillary and breast area.  Reports Dr. Aguiar has ordered a     diagnostic rt mammogram.  Bone density completed 11/2018.  Appetite is good; wt     stable.  She is not exercising as she should.  Denies changes to left breast at     this time.            Cancer Details            Right breast invasive lobular carcinoma ER/OH+ HER2- Ki 6740%      Right kidney Oncocytoma            Clinical Staging      Stage IIB ([id number])            Treatments      Chemotherapy      3/20/18 thru 5/2018 completed 4C TC     2/14/19 Arimidex initiated      Radiation Therapy      4/18 thru 11/16/18 received [id number] XRT rt breast/subclavian nodes            Clinical Trial Participant      No            ECOG Performance Status      0            PAST, FAMILY   Past Medical History      Past  Medical History:  Alzheimer's Disease, Depression, Diabetes Type 2,     Hypertension, Thyroid Disease      Hematology/Oncology (F):  Anemia, Breast Cancer            Past Surgical History      Lumpectomy            Family History      Family History:  Colorectal Cancer (maternal grandmother, ), Leukemia (mother)            Social History      Marital Status:        Lives independently:  Yes      Number of Children:  2      Occupation:  RETIRED TEACHER            Tobacco Use      Tobacco status:  Never smoker            Alcohol Use      Alcohol intake:  None            Substance Use      Substance use:  Denies use            REVIEW OF SYSTEMS      General:  Admits: Fatigue;          Denies: Appetite Change, Fever, Night Sweats, Weight Gain, Weight Loss      Eye:  Denies: Blurred Vision, Corrective Lenses, Diplopia, Eye Irritation, Eye     Pain, Eye Redness, Spots in Vision, Vision Loss      ENT:  Admits: Sinus Congestion;          Denies: Headache, Hearing Loss, Hoarseness, Seizures, Sore Throat      Cardiovascular:  Denies: Chest Pain, Edema Ankles, Edema Legs, Irregular     Heartbeat, Palpitations      Respiratory:  Denies: Coughing Blood, Productive Cough, Shortness of Air,     Wheezing      Gastrointestinal:  Denies: Bloody Stools, Constipation, Diarrhea, Frequent     Heartburn, Nausea, Problem Swallowing, Tarry Stools, Unable to Control Bowels,     Vomiting      Genitourinary (female):  Denies: Blood in Urine, Decrease Urine Stream, Frequent    Urination, Incontinence, Painful Urination      Musculoskeletal:  Denies: Back Pain, Leg Cramps, Muscle Pain, Muscle Weakness,     Painful Joints, Swollen Joints      Integumentary:  Denies: Bleeds Easily, Bruises Easily, Hair Changes, Jaundice,     Lesions, Mole Changes, Nail Changes, Pigment Changes, Rash, Skin Discoloration      Neurologic:  Denies: Dizziness, Fainting, Numbness\Tingling, Paralysis, Seizures      Psychiatric:  Admits: Depression;           Denies: Anxiety, Confused, Disoriented, Memory Loss      Endocrine:  Admits: Diabetes;          Denies: Cold Intolerance, Excessive Sweating, Excessive Thirst, Excessive     Urination, Heat Intolerance, Flushing, Hyperthyroidism, Hypothyroidism      Hematologic/Lymphatic:  Denies: Bruising, Bleeding, Enlarged Lymph Nodes,     Recurrent Infections, Transfusions            VITAL SIGNS AND SCORES      Vitals      Height 5 ft 2.56 in / 158.9 cm      Weight 210 lbs 1.574 oz / 95.3 kg      BSA 1.96 m2      BMI 37.7 kg/m2      Temperature 97.3 F / 36.28 C - Temporal      Pulse 100      Blood Pressure 115/67 Sitting, Left Arm      Pulse Oximetry 100%, RM AIR            Pain Score      Pain Scale Used:  Numerical      Pain Intensity:  0            Fatigue Score      Fatigue (0-10 scale):  3            EXAM      General Appearance:  Positive for: Alert, Oriented x3, Cooperative;          Negative for: Acute Distress      Neck:  Positive for: Supple;          Negative for: JVD, Masses      Respiratory:  Positive for: CTAB, Normal Respiratory Effort      Breast - Left:  Positive for: Appearance (Normal-appearing female breast);          Negative for: Adenopathy, Discharge, Mass, Skin Changes      Breast - Right:  Positive for: Appearance (Well-healed surgical incision on the     upper outer breast and axilla);          Negative for: Adenopathy, Discharge, Mass, Skin Changes      Chest:  Port in Place      Abdomen/Gastro:  Positive for: Normal Active Bowel Sounds, Soft;          Negative for: Distention, Hepatosplenomegaly, Tenderness      Cardiovascular:  Positive for: RRR;          Negative for: Gallop, Murmur, Peripheral Edema, Rub      Psychiatric:  Positive for: Appropriate Affect, Intact Judgement      Lymphatic:  Negative for: Axillary, Cervical, Infraclavicular, Supraclavicular            PREVENTION      Hx Influenza Vaccination:  No      Influenza Vaccine Declined:  Yes      2 or More Falls Past Year?:  No      Fall  Past Year with Injury?:  No      Encouraged to follow-up with:  PCP regarding preventative exams.      Chart initiated by      LARA VALLES [facility]            ALLERGY/MEDS      Allergies      Coded Allergies:             ERYTHROMYCIN BASE (Verified  Adverse Reaction, Unknown, Severe Headache,     6/12/19)            Medications      Last Reconciled on 6/12/19 15:36 by RICHIE COLON      Anastrozole (Anastrozole) 1 Mg Tablet      1 MG PO QDAY, #90 TAB 3 Refills         Prov: NICK LEDESMA         6/12/19       Insulin NPH Hum/Reg Insulin Hm (Relion Novolin 70-30 Vial) 100 Unit/1 Ml Vial      5 UNITS SUBQ BK INSULIN, #1 VIAL         Reported         6/12/19       Aspirin EC (Asprin EC) 325 Mg Tablet.dr      325 MG PO QDAY, #100 TAB 0 Refills         Reported         3/12/19       Levothyroxine (Levothyroxine) 0.25 Mg Tablet      50 MCG PO QDAY@07, #30 TAB 2 Refills         Prov: ERIC PARIS onc         1/2/19       glipiZIDE (glipiZIDE*) 5 Mg Tablet      15 MG PO BID, #180 TAB 2 Refills         Prov: Herbert Moody         11/21/18       Citalopram HBr (CeleXA) 40 Mg Tablet      40 MG PO QDAY, #30 TAB 2 Refills         Prov: Herbert Moody         11/21/18       Lisinopril* (Lisinopril*) 5 Mg Tablet      5 MG PO QDAY, #30 TAB 2 Refills         Prov: Herbert Moody         11/21/18       Metformin HCl (Glucophage) 1,000 Mg Tablet      1 TAB PO BID for 30 Days, #60 TAB 2 Refills         Prov: Herbert Moody         11/21/18       Acetaminophen Es (Tylenol Extra Strength) 500 Mg Tablet      500 MG PO Q4-6H PRN for PAIN OR FEVER, #100 TAB 0 Refills         Reported         4/3/18       Cholecalciferol (Vitamin D3*) 2,000 U Tablet      2000 UNITS PO QDAY, #30 TAB 0 Refills         Reported         3/14/18       Biotin (Biotin) 2,500 Mcg Cap      1000 MCG PO QDAY, #30 CAP         Reported         3/14/18       diphenhydrAMINE HCl (BENADRYL) 25 Mg Capsule      25 MG PO Q4-6H PRN for ALLERGIES, #100 TAB          Reported         3/12/18       Potassium (Potassium) 99 Mg Tablet      99 MG PO TID, TAB         Reported         3/9/18       Magnesium Amino Acid Chelate (Magnesium*) 100 Mg Tablet      250 MCG PO QDAY, TAB         Reported         3/9/18       Cetirizine Hcl (ZyrTEC) 10 Mg Tablet      10 MG PO QDAY, TAB         Reported         5/6/14      Medications Reviewed:  No Changes made to meds            IMPRESSION/PLAN      Impression      Breast cancer.            Diagnosis      Breast cancer         Malignant neoplasm of upper-outer quadrant of right breast in female,        estrogen receptor positive - C50.411, Z17.0         Breast location: upper outer quadrant of breast         Estrogen receptor status: positive         Patient sex: female         Laterality: right            Notes      Patient is on adjuvant hormone therapy with anastrozole 1 mg daily.  Tolerating     well.  I see no evidence of disease progression or recurrence by her history,     physical examination.  She is up-to-date on mammogram.  She is doing a little     bit of range of motion issues from the arm but not enough that she feels the     need for therapy at this point.  We discussed low-fat low-cholesterol diet and     routine aerobic exercise as lifestyle modifications to decrease the risk of     breast cancer recurrence.  Continue anastrozole for a minimum of 5 years.  RTC 4    months for ongoing surveillance.      New Medications      * Insulin NPH Hum/Reg Insulin Hm (Relion Novolin 70-30 Vial) 100 UNIT/1 ML VIAL:      5 UNITS SUBQ BK INSULIN #1      Renewed Medications      * Anastrozole 1 MG TABLET: 1 MG PO QDAY #90      Discontinued Medications      * Insulin Asp/Prt/Insulin Aspart (NovoLOG Mix 70/30 FLEXPEN*) 100 UNIT/1 ML       INSULN.PEN: 25 UNITS SUBQ BID INSULIN 30 Days #1         Instructions: Dispense 1 box of 100 pen needles also.      New Office Procedures      * Port Flush, Routine         Dx: Breast cancer - C50.919             Patient Education            DI for Lymphedema      Patient Education Provided:  Yes            Topics Patient Counseled on      Lymphedema therapy                 Disclaimer: Converted document may not contain table formatting or lab diagrams. Please see [facility] for the authenticated document.

## 2021-05-28 NOTE — PROGRESS NOTES
Patient: KONG FORBES     Acct: [id number]     Report: #[id number]  UNIT #: [id number]     : 1955    Encounter Date:2019  PRIMARY CARE: VILMA ZIEGLER DO  ***Signed***  --------------------------------------------------------------------------------------------------------------------  NURSE INTAKE      Referring Provider/Copies To      Referring Provider:  Pita Mcintosh      Provider Not Found in Lookup:  ANTOINETTE ARCE      PCP Not Found in Lookup:  [facility]            History and Present Illness      Past Oncology Illness History      This is a very pleasant 62-year-old female with newly diagnosed breast cancer     and a kidney mass.            -.  Mammogram shows a suspicious 1.5 cm spiculated mass at 10:00     in the right breast.  Irregular abnormal right axillary lymph node.      -.  Ultrasound core needle biopsy returned as invasive lobular     carcinoma.  [place] histologic score 1.  Size of largest focus 13 mm.      Ancillary studies ER +80%.  VA +70%.  Ki-67 40% HER-2/rhea negative.  Metastatic     lobular carcinoma involves lymph node.      -.  CT chest, abdomen, and pelvis showed a 1.8 cm x 2 sent a meter     heterogeneously enhancing exophytic lower pole of the right kidney.  There is     also a 0.35 cm noncalcified nodule in the left upper lobe.      -.  Bone scan showed no abnormal skeletal uptake.  Multifocal focal     degenerative uptake      -.  There is a 2.6 x 4 x 3.4 cm enhancing mass was spiculated     worsened compatible with no invasive lobular carcinoma.  There are 2 right     axillary lymph nodes are enlarged.      --May 2018.  Completed 4 cycles of TC      -.  Patient had a right sentinel node and right lumpectomy.      Pathology revealed invasive lobular carcinoma.  Negative margins.  Number of     lymph nodes with macro metastases 2.  ER 70%.  VA  80%.  Ki-67 2%.  Tumor size 3     cm.      -September 13, 2018. Presents for follow up for breast cancer. Patient has had 2    radiation consults and has not been to either consult  secondary to poor     transportation to see Dr. Aguiar. Discussed with patient regarding the     importance of possible radiation to the breast. Breast exam completed and did     palpate a lump in the right upper breast about the 10:00 to 12:00 position.     Patient reports she has felt an area to same location. She is looking for new     PCP since hers has left [facility].Requests home meds be filled until she     finds new PCP. Did have surgery for right tumor to the right kidney which she     states was benign. Has follow up appointment with Dr. Mcintosh today.       -November 7-2018 DEXA scan was normal      -January 3-2019.  WBC 6.75.  Hemoglobin 11.9.  Platelet count 268,000            HPI - Oncology Interim      Patient presents today with left big toe fungal infection.  No fever or chills            ECOG Performance Status      1            PAST, FAMILY   Past Medical History      Past Medical History:  Alzheimer's Disease, Depression, Diabetes Type 2,     Hypertension, Thyroid Disease      Hematology/Oncology (F):  Anemia, Breast Cancer            Past Surgical History      Lumpectomy      BROKE FOOT            Family History      Family History:  Colorectal Cancer (maternal grandmother, ), Leukemia (mother)            Social History      Marital Status:        Lives independently:  Yes            Tobacco Use      Tobacco status:  Never smoker            Alcohol Use      Alcohol intake:  None            Substance Use      Substance use:  Denies use            REVIEW OF SYSTEMS      General:  Denies: Appetite Change, Fatigue, Fever, Night Sweats, Weight Gain,     Weight Loss      Eye:  Denies: Blurred Vision, Corrective Lenses, Diplopia, Eye Irritation, Eye     Pain, Eye Redness, Spots in Vision, Vision Loss      ENT:   Denies: Headache, Hearing Loss, Hoarseness, Seizures, Sinus Congestion,     Sore Throat      Cardiovascular:  Denies: Chest Pain, Edema Ankles, Edema Legs, Irregular     Heartbeat, Palpitations      Respiratory:  Denies: Coughing Blood, Productive Cough, Shortness of Air,     Wheezing      Gastrointestinal:  Denies: Bloody Stools, Constipation, Diarrhea, Frequent     Heartburn, Nausea, Problem Swallowing, Tarry Stools, Unable to Control Bowels,     Vomiting      Genitourinary (female):  Denies: Blood in Urine, Decrease Urine Stream, Frequent    Urination, Incontinence, Painful Urination      Musculoskeletal:  Denies: Back Pain, Leg Cramps, Muscle Pain, Muscle Weakness,     Painful Joints, Swollen Joints      Integumentary:  Denies: Bleeds Easily, Bruises Easily, Hair Changes, Jaundice,     Lesions, Mole Changes, Nail Changes, Pigment Changes, Rash, Skin Discoloration      Neurologic:  Denies: Dizziness, Fainting, Numbness\Tingling, Paralysis, Seizures      Psychiatric:  Denies: Anxiety, Confused, Depression, Disoriented, Memory Loss      Endocrine:  Denies: Cold Intolerance, Diabetes, Excessive Sweating, Excessive     Thirst, Excessive Urination, Heat Intolerance, Flushing, Hyperthyroidism,     Hypothyroidism      Hematologic/Lymphatic:  Denies: Bruising, Bleeding, Enlarged Lymph Nodes,     Recurrent Infections, Transfusions      Reproductive:  Denies: Menopause, Heavy Periods, Pregnant, Still Menstruating            VITAL SIGNS AND SCORES      Vitals      Height 5 ft 2.56 in / 158.9 cm      Weight 211 lbs 3.211 oz / 95.8 kg      BSA 1.97 m2      BMI 37.9 kg/m2      Temperature 97.7 F / 36.5 C - Temporal      Pulse 117      Blood Pressure 122/74 Sitting, Left Arm      Pulse Oximetry 100%, ROOM AIR            Pain Score      Experiencing any pain?:  No      Pain Scale Used:  Numerical      Pain Intensity:  0            Fatigue Score      Experiencing any fatigue?:  No      Fatigue (0-10 scale):  0 (none)             EXAM      General Appearance:  Positive for: Alert, Oriented x3, Cooperative      Eye:  Positive for: Anicteric Sclerae, Moist Conjunctiva, PERRLA      HEENT:  Positive for: Oropharynx clear;          Negative for: Dentition, Erythema      Neck:  Positive for: Full ROM;          Negative for: JVD      Respiratory:  Positive for: CTAB, Normal Respiratory Effort      Abdomen/Gastro:  Positive for: Normal Active Bowel Sounds;          Negative for: Hepatosplenomegaly      Cardiovascular:  Positive for: Normal PMI;          Negative for: Murmur      Skin:  Positive for: Normal Temperature, Normal Texture and Turgor, Normal Tone      Psychiatric:  Positive for: AAO X 3, Appropriate Affect      Neurologic:  Positive for: Cranial Ner II-XII Intact;          Negative for: Deep Tendon Reflexes      Musculoskeletal:  Positive for: Full ROM Lower Extremety, Full ROM Upper     Extremety, Full Muscle Strength      Lower Extremities:  Positive for: Normal Gait and Station;          Negative for: Edema      Lymphatic:  Negative for: Axillary, Cervical, Epitrochlear, Femoral,     Infraclavicular, Inguinal, Occipital, Popliteal, Posterior auricular,     Preauricular, Supraclavicular            PREVENTION      Hx Influenza Vaccination:  No      Influenza Vaccine Declined:  Yes      2 or More Falls Past Year?:  No      Fall Past Year with Injury?:  No      Hx Pneumococcal Vaccination:  No      Encouraged to follow-up with:  PCP regarding preventative exams.      Chart initiated by      KIZZY NORMAN CMA            ALLERGY/MEDS      Allergies      Coded Allergies:             ERYTHROMYCIN BASE (Verified  Adverse Reaction, Unknown, Severe Headache,     1/3/19)            Medications      Last Reconciled on 1/4/19 17:00 by ANNE NARVAEZ MD      Levothyroxine (Levothyroxine) 0.25 Mg Tablet      50 MCG PO QDAY@07, #30 TAB 2 Refills         Prov: ERIC PARIS onc         1/2/19       glipiZIDE (glipiZIDE*) 5 Mg Tablet      15 MG  PO BID, #180 TAB 2 Refills         Prov: Herbert Moody         11/21/18       Citalopram HBr (CeleXA) 40 Mg Tablet      40 MG PO QDAY, #30 TAB 2 Refills         Prov: Herbert Moody         11/21/18       Lisinopril* (Lisinopril*) 5 Mg Tablet      5 MG PO QDAY, #30 TAB 2 Refills         Prov: Herbert Moody         11/21/18       Metformin HCl (Glucophage) 1,000 Mg Tablet      1 TAB PO BID for 30 Days, #60 TAB 2 Refills         Prov: Herbert Moody         11/21/18       Insulin Asp/Prt/Insulin Aspart (NovoLOG Mix 70/30 FLEXPEN*) 100 Unit/1 Ml     Insuln.pen      25 UNITS SUBQ BID INSULIN for 30 Days, #1 BOX 2 Refills         Prov: ERIC PARIS onc         9/13/18       Acetaminophen Es (Tylenol Extra Strength) 500 Mg Tablet      500 MG PO Q4-6H PRN for PAIN OR FEVER, #100 TAB 0 Refills         Reported         4/3/18       Cholecalciferol (Vitamin D3*) 2,000 U Tablet      2000 UNITS PO QDAY, #30 TAB 0 Refills         Reported         3/14/18       Biotin (Biotin) 2,500 Mcg Cap      1000 MCG PO QDAY, #30 CAP         Reported         3/14/18       diphenhydrAMINE HCl (BENADRYL) 25 Mg Capsule      25 MG PO Q4-6H PRN for ALLERGIES, #100 TAB         Reported         3/12/18       Potassium (Potassium) 99 Mg Tablet      99 MG PO TID, TAB         Reported         3/9/18       Magnesium Amino Acid Chelate (Magnesium*) 100 Mg Tablet      250 MCG PO QDAY, TAB         Reported         3/9/18       Cetirizine Hcl (ZyrTEC) 10 Mg Tablet      10 MG PO QDAY, TAB         Reported         5/6/14      Medications Reviewed:  No Changes made to meds            IMPRESSION/PLAN      Impression      -Lobular breast cancer      -[id number].  Stage IIB. Right, upper, outer quadrant with 2 axillary nodes.        -Post lumpectomy and sentinel lymph node biopsy showed 3 cm residual cancer and     2 positive lymph nodes.       -Completed radiation treatment with Dr. Aguiar for radiation treatment.  Ki-67     is 2%.  ER 60% and NH 80%.      -No  signs and symptoms of cancer recurrence.      -Continue Arimidex.       -Mammogram due at our next visit.            -Oncocytoma      -There is a 1.8 x 2 cm renal mass noted on CT abdomen pelvis on March 7-2018.      -Orders placed to follow-up with Dr. Mcintosh for further evaluation.        -negative margins.       -Resolved      -Follow up with Dr. Mcintosh.             -Big toe infection      -Discoloration of the left big toe consistent with possible fungal      -Orders placed to see Dr. David Vuong for further evaluate            -Anemia      -MCV 82.8.  Hemoglobin 11.9       -Labs were done after today's visit.      -Workup revealed iron deficiency            -Iron deficiency      -Patient will be scheduled for iron transfused      -Follow-up after iron infusion            Diagnosis      Type II diabetes mellitus - E11.9            Nail fungus - B35.1            Breast cancer - C50.919            Fatigue - R53.83            Notes      -Today's visit is an encounter for patient's cancer evaluation and treatment.       -Patient's radiology exams, blood tests, physicians' notes, and medications were    reviewed today to assess patient's medical treatment plan. ECOG 1.       -Patient was advised to call us right away if there are any new symptoms after     today's visit for an urgent evaluation since this may be due to cancer     worsening. Patient voiced understanding and agreed to do so.      -Radiology imaging tests from November 2018 to today were reviewed independently    by me by direct visualization of the images.        -Old medical records were reviewed and summarized in chronological order in the     HPI today to maintain an updated medical record.      Discontinued Medications      * ARIPiprazole (ABILIFY) 15 MG TABLET: 15 MG PO QDAY 30 Days #30      New Diagnostics      * Iron Profile, Routine         Dx: Breast cancer - C50.919      * Ferritin Level, Routine         Dx: Breast cancer - C50.919       * CBC With Auto Diff, Routine         Dx: Breast cancer - C50.919      New Referrals      * Podiatry, As Soon As Possible         ARNULFO VUONG         Reason for Referral: rt big toe nail fungus post chemo         Dx: Type II diabetes mellitus - E11.9            Plan      Counseled patient to call us for an urgent visit if needed.  Check blood tests     and/or imaging tests as shown above.  Return to clinic in 3 months.  Orders     placed for iron transfusion.  Follow-up with Dr. Vuong.  Appreciate his     recommendations per            Patient Education      Patient Education Provided:  Yes                 Disclaimer: Converted document may not contain table formatting or lab diagrams. Please see [employer] for the authenticated document.

## 2021-05-28 NOTE — PROGRESS NOTES
Patient: KONG PATEL     Acct: [id number]     Report: #[id number]  UNIT #: [id number]     : 1955    Encounter Date:2019  PRIMARY CARE: VILMA ZIEGLER DO  ***Signed***  --------------------------------------------------------------------------------------------------------------------  ADDENDUM: 19          Addendum: CESILIA GREWAL on 19 @ 08:12      Addendum      Chart Notes      Pt appropriate for clinical trial.            NURSE INTAKE      Visit Type      Established Patient Visit            Chief Complaint      breast ca      Intent of Therapy:  Curative            Referring Provider/Copies To      Referring Provider:  Pita Mcintosh      Provider Not Found in Lookup:  ANTOINETTE ARCE      PCP Not Found in Lookup:  [facility]            History and Present Illness      Past Oncology Illness History      Ms. Patel is a very pleasant 62-year-old WF with newly diagnosed breast     cancer and a kidney mass.  .  Mammogram shows a suspicious 1.5 cm    spiculated mass at 10:00 in the right breast.  Irregular abnormal right axillary    lymph node.  .  Ultrasound core needle biopsy returned as     invasive lobular carcinoma.  [place] histologic score 1.  Size of largest     focus 13 mm.  Ancillary studies ER +80%.  VT +70%.  Ki-67 40% HER-2/rhea neg    ative.  Metastatic lobular carcinoma involves lymph node.  .  CT     chest, abdomen, and pelvis showed a 1.8 cm x 2 sent a meter heterogeneously     enhancing exophytic lower pole of the right kidney.  There is also a 0.35 cm     noncalcified nodule in the left upper lobe.  .  Bone scan showed no     abnormal skeletal uptake.  Multifocal focal degenerative uptake. .      There is a 2.6 x 4 x 3.4 cm enhancing mass was spiculated worsened compatible     with no invasive lobular carcinoma.  There are 2 right axillary lymph nodes are     enlarged.   July .  Patient had a right sentinel node and right     lumpectomy.  Pathology revealed invasive lobular carcinoma.  Negative margins.      Number of lymph nodes with macro metastases 2.  ER 70%.  AR 80%.  Ki-67 2%.      Tumor size 3 cm. The patient also underwent right robotic partial nephrectomy     per Dr. 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  September 13, 2018. Presents for follow     up for breast cancer. Patient has had 2 radiation consults and has not been to     either consult  secondary to poor transportation to see Dr. Aguiar. Discussed     with patient regarding the importance of possible radiation to the breast.     Breast exam completed and did palpate a lump in the right upper breast about the    10:00 to 12:00 position. Patient reports she has felt an area to same location.     She is looking for new PCP since hers has left [facility].Requests home     meds be filled until she finds new PCP. Did have surgery for right tumor to the     right kidney which she states was benign. Has follow up appointment with Dr. Mcintosh today. November 7-2018 DEXA scan was normal.            HPI - Oncology Interim      Pt returns to clinic for f/u right breast cancer.  She is not currently on     anti-estrogen therapy.  Her right breast/axilla area feels tight at times;     denies limitation of ROM.  She denies skin breakdown or nipple drainage at this     time; however there remains skin discoloration from XRT. Unsure when she will     see rad/onc next--initial mammogram since OR due 5/2019.  She has seen her PCP     regarding her right great toe and it is being managed.  She denies fever,     lymphadenopathy or distress at this time.            Cancer Details            Right breast invasive lobular carcinoma ER/AR+ HER2- Ki 6740%      Right kidney Oncocytoma            Clinical Staging      Stage IIB ([id number])            Treatments      Chemotherapy      3/20/18 thru 5/2018 completed 4C TC     2/14/19  Arimidex initiated      Radiation Therapy      4/18 thru 11/16/18 received [id number] XRT rt breast/subclavian nodes            Clinical Trial Participant      No            ECOG Performance Status      0            PAST, FAMILY   Past Medical History      Past Medical History:  Alzheimer's Disease, Depression, Diabetes Type 2,     Hypertension, Thyroid Disease      Hematology/Oncology (F):  Anemia, Breast Cancer            Past Surgical History      Lumpectomy            Family History      Family History:  Colorectal Cancer (maternal grandmother, ), Leukemia (mother)            Social History      Marital Status:        Lives independently:  Yes      Number of Children:  2      Occupation:  RETIRED TEACHER            Tobacco Use      Tobacco status:  Never smoker            Alcohol Use      Alcohol intake:  None            Substance Use      Substance use:  Denies use            REVIEW OF SYSTEMS      General:  Denies: Appetite Change      Eye:  Denies: Eye Pain      ENT:  Denies: Seizures      Cardiovascular:  Denies: Palpitations      Respiratory:  Denies: Shortness of Air      Gastrointestinal:  Denies: Constipation      Genitourinary (female):  Denies: Incontinence      Musculoskeletal:  Denies: Muscle Weakness      Integumentary:  Denies: Skin Discoloration      Neurologic:  Denies: Paralysis            VITAL SIGNS AND SCORES      Vitals      Height 5 ft 2.56 in / 158.9 cm      Weight 217 lbs 2.450 oz / 98.5 kg      BSA 1.99 m2      BMI 39.0 kg/m2      Temperature 98 F / 36.67 C - Temporal      Pulse 94      Respirations 16      Pulse Oximetry 100%, RM AIR            Pain Score      Pain Scale Used:  Numerical      Pain Intensity:  0            Fatigue Score      Fatigue (0-10 scale):  0 (none)            EXAM      General Appearance:  Positive for: Alert, Oriented x3, Cooperative, Obese      Eye:  Positive for: Anicteric Sclerae, Moist Conjunctiva, Reactive to Light      HEENT:  Positive for:  Oropharynx clear;          Negative for: Erythema, Exudates, Pallor, Thrush      Neck:  Positive for: Full ROM, Supple;          Negative for: JVD, Masses      Respiratory:  Positive for: CTAB, Diminished Breath, Normal Respiratory Effort;          Negative for: Crackles, Rales, Rhochi, Stridor      Breast - Left:  Negative for: Adenopathy, Discharge, Mass      Breast - Right:  Positive for: Skin Changes (radiation discolortion);          Negative for: Adenopathy, Discharge, Mass      Abdomen/Gastro:  Positive for: Normal Active Bowel Sounds, Soft;          Negative for: Guarding, Rebound, Tenderness      Cardiovascular:  Positive for: Normal PMI, RRR;          Negative for: JVD, Peripheral Edema      Skin:  Positive for: Normal Temperature, Normal Texture and Turgor, Normal Tone;             Negative for: Lesions, Rash, Ulcers      Psychiatric:  Positive for: AAO X 3, Appropriate Affect, Intact Judgement      Neurologic:  Positive for: Cranial Ner II-XII Intact;          Negative for: Dizziness, Dysphagia, Focal Sensory Deficit, Headache,     Numbness, Weakness      Musculoskeletal:  Positive for: Full ROM Lower Extremety, Full ROM Upper     Extremety, Full Muscle Strength, Full Muscle Tone      Lower Extremities:  Positive for: Normal Gait and Station;          Negative for: Edema, Weakness      Upper Extremities:  Negative for: Clubbing, Deformities, Digital Cyanosis,     Digital Ischemia, Edema, Weakness      Lymphatic:  Negative for: Axillary, Cervical, Epitrochlear, Infraclavicular,     Occipital, Posterior auricular, Preauricular, Supraclavicular            PREVENTION      Hx Influenza Vaccination:  No      Influenza Vaccine Declined:  Yes      2 or More Falls Past Year?:  No      Fall Past Year with Injury?:  No      Encouraged to follow-up with:  PCP regarding preventative exams.      Chart initiated by      SYDNEE CHANDLER MA            ALLERGY/MEDS      Allergies      Coded Allergies:              ERYTHROMYCIN BASE (Verified  Adverse Reaction, Unknown, Severe Headache,     2/13/19)            Medications      Last Reconciled on 2/13/19 14:22 by RICHIE COLON      Anastrozole (Anastrozole) 1 Mg Tablet      1 MG PO QDAY, #30 TAB         Prov: NICK LEDESMA         2/13/19       Levothyroxine (Levothyroxine) 0.25 Mg Tablet      50 MCG PO QDAY@07, #30 TAB 2 Refills         Prov: REIC PARIS onc         1/2/19       glipiZIDE (glipiZIDE*) 5 Mg Tablet      15 MG PO BID, #180 TAB 2 Refills         Prov: Herbert Moody         11/21/18       Citalopram HBr (CeleXA) 40 Mg Tablet      40 MG PO QDAY, #30 TAB 2 Refills         Prov: Herbert Moody         11/21/18       Lisinopril* (Lisinopril*) 5 Mg Tablet      5 MG PO QDAY, #30 TAB 2 Refills         Prov: Herbert Moody         11/21/18       Metformin HCl (Glucophage) 1,000 Mg Tablet      1 TAB PO BID for 30 Days, #60 TAB 2 Refills         Prov: Herbert Moody         11/21/18       Insulin Asp/Prt/Insulin Aspart (NovoLOG Mix 70/30 FLEXPEN*) 100 Unit/1 Ml     Insuln.pen      25 UNITS SUBQ BID INSULIN for 30 Days, #1 BOX 2 Refills         Prov: ERIC PARIS onc         9/13/18       Acetaminophen Es (Tylenol Extra Strength) 500 Mg Tablet      500 MG PO Q4-6H PRN for PAIN OR FEVER, #100 TAB 0 Refills         Reported         4/3/18       Cholecalciferol (Vitamin D3*) 2,000 U Tablet      2000 UNITS PO QDAY, #30 TAB 0 Refills         Reported         3/14/18       Biotin (Biotin) 2,500 Mcg Cap      1000 MCG PO QDAY, #30 CAP         Reported         3/14/18       diphenhydrAMINE HCl (BENADRYL) 25 Mg Capsule      25 MG PO Q4-6H PRN for ALLERGIES, #100 TAB         Reported         3/12/18       Potassium (Potassium) 99 Mg Tablet      99 MG PO TID, TAB         Reported         3/9/18       Magnesium Amino Acid Chelate (Magnesium*) 100 Mg Tablet      250 MCG PO QDAY, TAB         Reported         3/9/18       Cetirizine Hcl (ZyrTEC) 10 Mg Tablet      10 MG PO  QDAY, TAB         Reported         5/6/14      Medications Reviewed:  Changes made to meds            IMPRESSION/PLAN      Impression      64yo right breast cancer--s/p XRT bone density normal--will initiate Arimidex     therapy            Diagnosis      Breast cancer         Malignant neoplasm of upper-outer quadrant of right breast in female,        estrogen receptor positive         Breast location: upper outer quadrant of breast         Estrogen receptor status: positive         Patient sex: female         Laterality: right            Anemia         Anemia, unspecified type         Anemia type: unspecified type            Notes      New Medications      * Anastrozole 1 MG TABLET: 1 MG PO QDAY #30      New Diagnostics      * CMP Comp Metabolic Panel, Routine         Dx: Breast cancer - C50.919      * CBC With Auto Diff, Routine         Dx: Breast cancer - C50.919      * Hemoglobin A1c, Routine         Dx: Breast cancer - C50.919      * Iron Profile, Routine         Dx: Anemia - D64.9      * Ferritin Level, Routine         Dx: Anemia - D64.9            Plan      1. Arimidex PO qd      2. Massage right breast/axilla after warm shower to decrease induration      3. RTC 1mo to f/u on Arimidex therapy            Patient Education            Anastrozole      Patient Education Provided:  Yes                 Disclaimer: Converted document may not contain table formatting or lab diagrams. Please see [employer] for the authenticated document.

## 2021-05-28 NOTE — PROGRESS NOTES
Patient: KONG FORBES     Acct: [id number]     Report: #[id number]  UNIT #: [id number]     : 1955    Encounter Date:2018  PRIMARY CARE: VILMA ZIEGLER DO  ***Signed***  --------------------------------------------------------------------------------------------------------------------  Chief Complaint      May 1, 2018      Breast Cancer      Intent of Therapy?:  Curative            Current Plan      -Lobular breast cancer      -T2 N1 M0.  Stage IIB. Right, upper, outer quadrant with 2 axillary nodes.        -CT chest, abdomen, and pelvis and bone scan was done.  No metastatic disease.      As an incidental finding of a 1.8 x 2 cm mass in the lower right renal pole.      It is also a 0.35 cm noncalcified nodule.  This is too small to characterize.      Radiologist recommended a CT follow-up in 12 months      -Patient was given a copy of the national cancer center network guidelines.  If     patient remains a locally advanced stage we discussed treatment options     including neoadjuvant versus adjuvant chemotherapy.  After discussion of risk     and benefits we have agreed for neoadjuvant chemotherapy.      -Patient will proceed with neoadjuvant chemotherapy.  Afterwards patient will     have surgery with Dr. Melissa Baig.            -Renal mass      -There is a 1.8 x 2 cm renal mass noted on CT abdomen pelvis on .      -Orders placed to follow-up with Dr. Mcintosh for further evaluation.  Appreciate     recommendations      -Patient will have surgery after chemotherapy.            -Today's Interim Plan      -Orders placed to see a genetic counselor due to likely to cancers.  Renal     cancer and breast cancer      -Proceed with chemotherapy            Medical Evaluation of Acute and Chronic Medical Conditions today      -Hyperglycemia      -Glucose 189 prior to chemotherapy      -Significant history of insulin-dependent diabetes      -Patient reports blood sugars will spike to  300-400 with pre-treatment steroids     and chemotherapy treatment.        -Patient encouraged to discuss insulin coverage during treatment to manage     elevated blood sugar.      -Encouraged to exercise to promote lower blood sugar; as well as toussaint ongoing     fatigue.            Medical Evaluation of Chemotherapy Associated Toxicities Today      -Chemotherapy associated fatigue      -Patient's weakness has remained stable.      -Recommended Exercise.       -Continue close observation.      -Chemotherapy associated nausea      -Patient's nausea has remained stable.      -Currently on Anti-Nausea medications.       -Continue close observation.      -Chemotherapy associated anemia      -Does not require blood transfusion on today's visit.       -Transfuse blood when hemoglobin is less than 7.      -Continue close observation.      -Chemotherapy associated thrombocytopenia      -Does not require platelet transfusion on today's visit.       -Transfuse platelet when platelet count < 20,000.       -Continue close observation.      -Anxiety due to cancer      -Patient's anxiety is well controlled today.       -Continue current management.       -Today's Assessment      -ECOG 1.       -Patient's imaging exams, blood tests, physicians' notes, and any new findings     since our last visit were reviewed today to reassess patient's medical     treatment plan. .       -Old medical records were reviewed and summarized in chronological order in the     HPI today to maintain an updated medical record.       -Patient's radiology imaging tests from our last visit were reviewed     independently by me by direct visualization of the images.        -Patients current lab tests and medications were carefully reviewed to evaluate     patient's current treatment plan today. Proceed with chemotherapy plan.       -Patient was advised to call us right away if there are any new symptoms for an     urgent visit for further evaluation.  Patient voiced understanding and agreed to     do so.      Notes      New Medications      * Meth/Meblue/Sod Phos/Psal/Hyos (Uribel Capsule) 1 EACH CAPSULE: 1 EACH PO      * PHENAZOPYRIDINE HCL (Azo Standard*) 97.5 MG TABLET: 97.5 MG PO TID #90      Intensive Monitor for Toxicity:  Labs Reviewed, Chemo Orders Reviewd, Meds\    Narcotics Reviewed      Labs Reviewed During Visit?:  Yes      Time Spent:  > 50% /Coord Care      Patient Education Provided:  Yes            ECOG      ECOG Performance Status:  0            History and Present Illness      This is a very pleasant 62-year-old female with newly diagnosed breast cancer     and a kidney mass.            -February 9-2018.  Mammogram shows a suspicious 1.57 m spiculated mass at 10:00     in the right breast.  Irregular abnormal right axillary lymph node.      -February .  Ultrasound core needle biopsy returned as invasive lobular     carcinoma.  [place] histologic score 1.  Size of largest focus 13 mm.      Ancillary studies ER +80%.  DC +70%.  Ki-67 40% HER-2/rhea negative.  Metastatic     lobular carcinoma involves lymph node.      -March 7-2018.  CT chest, abdomen, and pelvis showed a 1.8 cm x 2 sent a meter     heterogeneously enhancing exophytic lower pole of the right kidney.  There is     also a 0.35 cm noncalcified nodule in the left upper lobe.      -March 7-2018.  Bone scan showed no abnormal skeletal uptake.  Multifocal focal     degenerative uptake      -March 8-2018.  There is a 2.6 x 4 x 3.4 cm enhancing mass was spiculated     worsened compatible with no invasive lobular carcinoma.  There are 2 right     axillary lymph nodes are enlarged.      -March .  Cycle 1 day 1 Docetaxel Cytoxan      -April 10, 2018-Cycle 2 day 1 Docetaxel   -May 1-2018. Cycle 3 Day 1 Docetaxel         Allergies      Coded Allergies:             ERYTHROMYCIN BASE (Verified  Adverse Reaction, Unknown, Severe Headache, 5/ 1/18)           SULFA  (SULFONAMIDE ANTIBIOTICS) (Verified  Adverse Reaction, Unknown, 5/1/ 18)       it does not work on her            Medications      Last Reconciled on 5/2/18 18:57 by ANNE MOODY MD      Phenazopyridine Hcl (Azo Standard*) 97.5 Mg Tablet      97.5 MG PO TID, #90 TAB         Reported         5/1/18       Meth/Meblue/Sod Phos/Psal/Hyos (Uribel Capsule) 1 Each Capsule      1 EACH PO, CAP         Reported         5/1/18       OLANZapine (ZyPREXA*) 10 Mg Tablet      10 MG PO QDAY for 30 Days, #30 TAB         Prov: Anne Moody         4/24/18       Cranberry Ext/C/L. Sporogenes (Azo Cranberry) 1 Each Tablet      100 TAB PO Q4-6H Y for PAIN/ABDOMINAL CRAMPS, #30 TAB         Reported         4/24/18       Meth/Meblue/Sod Phos/Psal/Hyos (Uribel Capsule) 1 Each Capsule      1 EACH PO BID, CAP         Reported         4/24/18       Citalopram HBr (CeleXA) 40 Mg Tablet      40 MG PO QDAY, #30 TAB 0 Refills         Prov: Anne Moody         4/3/18       Lisinopril* (Lisinopril*) 5 Mg Tablet      5 MG PO QDAY, #30 TAB 0 Refills         Prov: Anne Moody         4/3/18       Levothyroxine (Levothyroxine) 0.25 Mg Tablet      50 MCG PO QDAY@07, #30 TAB 0 Refills         Prov: Anne Moody         4/3/18       glipiZIDE (glipiZIDE*) 5 Mg Tablet      15 MG PO BID, #90 TAB 0 Refills         Prov: Anne Moody         4/3/18       metFORMIN HCl (Glucophage*) 1,000 Mg Tablet      1 TAB PO BID for 30 Days, #60 TAB 0 Refills         Prov: Anne Moody         4/3/18       Mouthwash (Magic Mouthwash) 1 Each Bottle      15 ML PO Q4H Y for SORENESS, ML         Reported         4/3/18       Acetaminophen* (Tylenol Extra Strength*) 500 Mg Tablet      500 MG PO Q4-6H Y for PAIN OR FEVER, #100 TAB 0 Refills         Reported         4/3/18       Miconazole Nitrate (Miconazole 7 Vag Cream) 45 Gm Cream.appl      1 APL VAG HS, #1 TUBE         Prov: ERIC PARIS onc         3/27/18       Cholecalciferol (Vitamin D3*) 2,000 U  Tablet      2000 UNITS PO QDAY, #30 TAB 0 Refills         Reported         3/14/18       Biotin (Biotin) 2,500 Mcg Cap      1000 MCG PO QDAY, #30 CAP         Reported         3/14/18       Prochlorperazine (Compazine*) 10 Mg/2 Ml Vial      10 MG IM Q6H Y for NAUSEA AND/OR VOMITING, VIAL         Reported         3/14/18       Dexamethasone (Dexamethasone) 2 Mg Tab      4 MG PO BID, TAB         Reported         3/14/18       Hydrocodone/Acetaminophen 5/325 MG (Norco 5/325 Mg) 1 Tab Tab      2 TAB PO Q4-6H Y for pain, #15 TAB 0 Refills         Prov: Antoinette Baig         3/13/18       Ondansetron (Ondansetron ODT*) 8 Mg Tab.rapdis      8 MG PO Q8H Y for NAUSEA, #24 TAB.RAPDIS 3 Refills         Prov: Herbert Moody         3/13/18       diphenhydrAMINE HCl (Benadryl*) 25 Mg Capsule      25 MG PO Q4-6H Y for ALLERGIES, #100 TAB         Reported         3/12/18       Potassium (Potassium) 99 Mg Tablet      3 PO TID, TAB         Reported         3/9/18       Magnesium Amino Acid Chelate (Magnesium*) 100 Mg Tablet      250 MCG PO QDAY, TAB         Reported         3/9/18       Insulin Asp/Prt/Insulin Aspart (NovoLOG Mix 70/30 VIAL*) 100 Units/Ml Vial      20 UNITS SUBQ BID INSULIN, #1 VIAL 0 Refills         Reported         3/30/17       Cetirizine Hcl (ZyrTEC*) 10 Mg Tablet      1 TAB PO QDAY, TAB         Reported         5/6/14            General Information      Provider Not Found in Lookup:  ANTOINETTE ARCE      [facility] Provider 2:  Antoinette Baig      [facility] Provider 3:  Pita Mcintosh            Chemo Status      On Oral Chemotherapy?:  No            PAST, FAMILY   Past Medical History      Past Medical History:  No Diabetes Type 1, Yes Diabetes Type 2, Yes Thyroid     Disease, No COPD, No Emphysema, No Hypertension, No Stroke, No High Cholesterol    , No Heart Attack, No Bleeding Condition, No Low or High RBC Count, No Low or     High WBC Count, No Low or High Platelet Coun, No Hepatitis, No Kidney Disease,     No Depression, No  Alzheimer's Disease, No Mental Disease, No Seizures, No     Arthritis, No Osteoporosis, No Osteopenia, No Short of Air, No Sleep apnea, No     Liver Disease, No STD, No Enlarged Prostate, Yes Other (INTERSISTAL CYSTITIS/    HIATAL HERNIA)      Hematology/oncology:  DENIES HX OF: Previous Treatment for CA, Anemia, Bladder     Cancer, Blood cancer, Brain cancer, Breast cancer, Cervical cancer, Coagulopathy    , Colorectal cancer, Endocrine cancer, Eye cancer, GI cancer,  cancer, Kidney     cancer, Leukemia, Leukocytosis, Leukopenia, Liver cancer, Lung cancer, Lymphoma    , Musculoskeletal cancer, Myeloma, Neurologic cancer, Oral cancer, Ovarian     cancer, Skin cancer, Stomach cancer, Thrombocytopenia, Thyroid cancer, Uterine     cancer, Other cancer history, Other hematologic history      Genetic/metabolic:  DENIES HX OF: Cystic fibrosis, Down syndrome, Other genetic     history, Other metabolic history            Past Surgical History      DENIES HX OF: Cataract extraction, Thyroid surgery, Lung biopsy, CABG surgery,     Coronary stent, Valve replacement, Appendectomy, Cholecystectomy, Splenectomy,     Bladder surgery, Nephrectomy, Joint replacement, Frature repair, Skin cancer     removal, Melanoma excision, Spinal surgery, Breast biopsy, Lumpectomy,     Mastectomy, bilateral, Mastectomy, right, Mastectomy, left, Hysterectomy, Peg     Tube Placement, VAD Placement, Biopsy, Other Past Surgical Hx            Family History      REPORTS HX OF: Colorectal cancer (MATERNAL GRANDMOTHER), Leukemia (MOTHER),     DENIES HX OF: Anemia, Blood disorders, Blood Cancer, Breast cancer, Cervical     cancer, Coagulopathy, Endocrine Cancer, Eye Cancer, GI Cancer,  Cancer,     Kidney Cancer, Leukocytosis, Leukopenia, Liver Cancer, Lung cancer, Lymphoma,     Melanoma, Musculoskeletal Cancer, Myeloma, Neurologic Cancer, Oral Cancer,     Ovarian cancer, Prostate cancer, Skin Cancer, Stomach Cancer, Testicular Cancer    ,  Thrombocytopenia, Thyroid cancer, Uterine cancer, Other Cancer History, Other     Hematology History            Social History      Lives independently:  Yes            Tobacco Use      Tobacco status:  Never smoker            Alcohol Use      Alcohol intake:  None            Substance Use      Substance use:  Denies use            REVIEW OF SYSTEMS      General:  Denies: Appetite change, Excessive sweating, Fatigue, Fever, Night     sweats, Weight gain, Weight loss, Other      Eyes:  Denies: Blurred vision, Corrective lenses, Diplopia, Eye irritation, Eye     pain, Eye redness, Spots in vision, Vision loss, Other      Ears, nose, mouth, throat:  Denies: Headache, Seizures, Visual Changes, Hearing     loss, Sinus Congestion, Hoarseness, Sore throat, Other      Cardiovascular:  Denies: Chest pain, Irregular heartbeat, Palpitations, Swollen     ankles/legs, Other      Respiratory:  Denies: Chest pain, Shortness of Air, Productive cough, Coughing     blood, Other      Gastrointestinal:  Denies: Nausea, Vomiting, Problem swallowing, Frequent     heartburn, Constipation, Diarrhea, Tarry stools, Bloody stools, Unable to     control bowels, Other      Kidney/Bladder:  Denies: Painful Urination, Change in urinary stream, Blood in     urine, Incontinence, Frequent Urination, Decreased urine stream, Other      Musculoskeletal:  Denies: New Back pain, Leg Cramps, Painful Joints, Swollen     Joints, Muscle Pain, Muscle weakness, Other      Skin:  DENIES: Jaundice, Easy Bleeding, Lesions/changes in moles, Nail changes,     Skin Discoloration, Rash, Other      Neurological:  Denies: Dizziness, Fainting, Numbness\Tingling, Paralysis,     Seizures, Other      Psychiatric:  Complains of: AAO X 3      Hematologic/lymphatic:  Denies: Bruising, Bleeding, Enlarged Lymph Nodes,     Recurrent infections, Other      Reproductive:  Denies Pregnant, Denies Menopause, Denies Still Menstruating,     Denies Heavy Periods, Denies Other             Vitals            Vital Signs              Date Time  Temp Pulse Resp B/P (MAP) Pulse Ox O2 Delivery O2 Flow Rate FiO2             5/1/18 09:24 98.1 100 16 116/71 98 RM AIR              General Appearance:  Alert, Oriented X3, Cooperative, No acute distress      Eyes:  Anicteric Sclerae, Moist Conjunctiva      HEENT:  Orophraynx clear, No Erythema, No Pallor, No Thrush      Neck:  Supple, Full ROM, No Masses or JVD      Respiratory:  CTAB, No Diminished Breath, No Rales, No Crackels, No Rhonchi      Breast\Chest:  Symmetrical      Abdomen\Gastro:  Soft, No Masses, No Hepatosplenomegaly      Cardio:  RRR, No Murmur, No, Peripheral Edema      Skin:  Normal Temperature, Normal Tone, Normal Texture and Turgor, No Rash,     lesions, ulcers      Psychiatric:  Appropriate Affect, Intact Judgement, AAO x3      Neuro:  Cranial Nerve II-XII Inta, No Focal Sensory Deficit      Genitourinary:  No Mazariegos Catheter      Muscularskeletal:  Normal Gait and Station, Full ROM of extremeties, Full     muscle strength\tone      Extremities:  No Digital Cyanosis, No Digital Ischemia      Lymphatic:  No Cervical, No Supraclavicular, No Infraclavicular, No Axillary,     No Inguinal            Lab Results      Laboratory Tests      5/1/18 09:00            PREVENTION      Hx Influenza Vaccination:  No      Influenza Vaccine Declined:  Yes      Hx Pneumococcal Vaccination:  No      Encouraged to follow-up with:  PCP regarding preventative exams.                 Disclaimer: Converted document may not contain table formatting or lab diagrams. Please see [employer] [facility] for the authenticated document.

## 2021-05-28 NOTE — PROGRESS NOTES
Patient: KONG FORBES     Acct: [id number]     Report: #[id number]  UNIT #: [id number]     : 1955    Encounter Date:2018  PRIMARY CARE: VILMA ZIEGLER DO  ***Signed***  --------------------------------------------------------------------------------------------------------------------  Chief Complaint      Mar 20, 2018      Breast Cancer      Intent of Therapy?:  Curative            Current Plan      -Lobular breast cancer      -T2 N1 M0.  Stage IIB      -CT chest, abdomen, and pelvis and bone scan was done.  No metastatic disease.      As an incidental finding of a 1.8 x 2 cm mass in the lower right renal pole.      It is also a 0.35 cm noncalcified nodule.  This is too small to characterize.      Radiologist recommended a CT follow-up in 12 months      -Patient was given a copy of the national cancer center network guidelines.  If     patient remains a locally advanced stage we discussed treatment options     including neoadjuvant versus adjuvant chemotherapy.  After discussion of risk     and benefits we have agreed for neoadjuvant chemotherapy.      -Patient will proceed with neoadjuvant chemotherapy.  Afterwards patient will     have surgery with Dr. Melissa Baig.            -Renal mass      -There is a 1.8 x 2 cm renal mass noted on CT abdomen pelvis on .      -Orders placed to follow-up with Dr. Mcintosh for further evaluation.  Appreciate     recommendations      -Patient will have surgery after chemotherapy.            -Interval Clinic Visit Changes      -Patient received cycle 1 of Cytoxan and Docetaxel            Medical Evaluation of Cancer Associated Symptoms today      -Cancer associated pain      -Patient's pain has remained stable.      -Continue current pain regimen.      -Cancer associated muscle weakness      -Patient's weakness has remained stable.      -Continue close observation.      -Cancer associated fatigue      -Patient's fatigue has remained stable.       -Recommended exercise.      -Anxiety due to cancer      -Patient's anxiety is well controlled today.       -Continue current management.       -Today's Assessment      -ECOG 1.       -Patient's imaging exams, blood tests, physicians' notes, and any new findings     since our last visit were reviewed today to reassess patient's medical     treatment plan. .       -Old medical records were reviewed and summarized in chronological order in the     HPI today to maintain an updated medical record.       -Patient's radiology imaging tests from our last visit were reviewed     independently by me by direct visualization of the images.        -Patients current lab tests and medications were carefully reviewed to evaluate     patient's current treatment plan today.       -Patient was advised to call us right away if there are any new symptoms for an     urgent visit for further evaluation. Patient voiced understanding and agreed to     do so.      Breast cancer - C50.919      Time Spent:  > 50% /Coord [facility]      Patient Education Provided:  Yes            History and Present Illness      This is a very pleasant 62-year-old female with newly diagnosed breast cancer     and a kidney mass.            -February 9-2018.  Mammogram shows a suspicious 1.57 m spiculated mass at 10:00     in the right breast.  Irregular abnormal right axillary lymph node.      -February .  Ultrasound core needle biopsy returned as invasive lobular     carcinoma.  Yosvany histologic score 1.  Size of largest focus 13 mm.      Ancillary studies ER +80%.  ID +70%.  Ki-67 40% HER-2/rhea negative.  Metastatic     lobular carcinoma involves lymph node.      -March 7-2018.  CT chest, abdomen, and pelvis showed a 1.8 cm x 2 sent a meter     heterogeneously enhancing exophytic lower pole of the right kidney.  There is     also a 0.35 cm noncalcified nodule in the left upper lobe.      -March 7-2018.  Bone scan showed no abnormal skeletal  uptake.  Multifocal focal     degenerative uptake      -March 8-2018.  There is a 2.6 x 4 x 3.4 cm enhancing mass was spiculated     worsened compatible with no invasive lobular carcinoma.  There are 2 right     axillary lymph nodes are enlarged.            Vitals      Height 62.56 in / 158.9 cm      Weight 214 lbs 8.121 oz / 97.3 kg      BSA 2.12 m2      BMI 38.5 kg/m2      Temperature 96.8 F / 36 C - Temporal      Pulse 96      Respirations 20      Blood Pressure 142/69      Pulse Oximetry 99%, ROOM AIR            Allergies      Coded Allergies:             ERYTHROMYCIN BASE (Verified  Adverse Reaction, Unknown, Severe Headache, 3/    20/18)           SULFA (SULFONAMIDE ANTIBIOTICS) (Verified  Adverse Reaction, Unknown, 3/20/    18)       it does not work on her            Medications      Last Reconciled on 3/21/18 22:20 by ANNE MOODY MD      Cholecalciferol (Vitamin D3*) 2,000 U Tablet      2000 UNITS PO QDAY, #30 TAB 0 Refills         Reported         3/14/18       Biotin (Biotin) 2,500 Mcg Cap      1000 MCG PO QDAY, #30 CAP         Reported         3/14/18       Prochlorperazine (Compazine*) 10 Mg/2 Ml Vial      10 MG IM Q6H Y for NAUSEA AND/OR VOMITING, VIAL         Reported         3/14/18       Dexamethasone (Dexamethasone) 2 Mg Tab      4 MG PO BID, TAB         Reported         3/14/18       Hydrocodone/Acetaminophen 5/325 MG (Norco 5/325 Mg) 1 Tab Tab      2 TAB PO Q4-6H Y for pain, #15 TAB 0 Refills         Prov: Melissa Baig         3/13/18       Ondansetron (Ondansetron ODT*) 8 Mg Tab.rapdis      8 MG PO Q8H Y for NAUSEA, #24 TAB.RAPDIS 3 Refills         Prov: Anne Moody         3/13/18       diphenhydrAMINE HCl (Benadryl*) 25 Mg Capsule      25 MG PO Q4-6H Y for ALLERGIES, #100 TAB         Reported         3/12/18       Ergocalciferol (Ergocalciferol*) 50,000 Unit Capsule      43313 UNITS PO MO@09, #4 CAP 0 Refills         Reported         3/12/18       Citalopram HBr (CeleXA) 40 Mg Tablet       40 MG PO QDAY, #30 TAB 0 Refills         Reported         3/12/18       Lisinopril* (Lisinopril*) 5 Mg Tablet      5 MG PO QDAY, #30 TAB 0 Refills         Reported         3/12/18       Potassium (Potassium) 99 Mg Tablet      3 PO TID, TAB         Reported         3/9/18       Magnesium Amino Acid Chelate (Magnesium*) 100 Mg Tablet      250 MCG PO QDAY, TAB         Reported         3/9/18       Insulin Asp/Prt/Insulin Aspart (NovoLOG Mix 70/30 VIAL*) 100 Units/Ml Vial      20 UNITS SUBQ BID INSULIN, #1 VIAL 0 Refills         Reported         3/30/17       Levothyroxine (Levothyroxine) 0.25 Mg Tablet      50 MCG PO QDAY@07, #30 TAB 0 Refills         Reported         3/30/17       glipiZIDE (glipiZIDE*) 5 Mg Tablet      15 MG PO BID, #90 TAB 0 Refills         Reported         3/30/17       Cetirizine Hcl (ZyrTEC*) 10 Mg Tablet      1 TAB PO QDAY, TAB         Reported         5/6/14       metFORMIN HCl (Glucophage*) 1,000 Mg Tablet      1 TAB PO BID, TAB 0 Refills         Reported         8/21/09            General Information      Provider Not Found in Lookup:  MELISSA ARCE      [facility] Provider 2:  Melissa Baig      [facility] Provider 3:  Pita Mcintosh            Past Medical History      No Diabetes Type 1, Yes Diabetes Type 2, No Thyroid Disease, No COPD, No     Emphysema, No Hypertension, No Stroke, No High Cholesterol, No Heart Attack, No     Bleeding Condition, No Low or High RBC Count, No Low or High WBC Count, No Low     or High Platelet Coun, No Hepatitis, No Kidney Disease, No Depression, No     Alzheimer's Disease, No Mental Disease, No Seizures, No Arthritis, No     Osteoporosis, No Osteopenia, No Short of Air, No Sleep apnea, No Liver Disease,     No STD, No Enlarged Prostate, Yes Other (INTERSISTAL CYSTITIS/HIATAL HERNIA)      Hematology/oncology:  DENIES HX OF: Previous Treatment for CA, Anemia, Bladder     Cancer, Blood cancer, Brain cancer, Breast cancer, Cervical cancer, Coagulopathy    , Colorectal  cancer, Endocrine cancer, Eye cancer, GI cancer,  cancer, Kidney     cancer, Leukemia, Leukocytosis, Leukopenia, Liver cancer, Lung cancer, Lymphoma    , Musculoskeletal cancer, Myeloma, Neurologic cancer, Oral cancer, Ovarian     cancer, Skin cancer, Stomach cancer, Thrombocytopenia, Thyroid cancer, Uterine     cancer, Other cancer history, Other hematologic history      Genetic/metabolic:  DENIES HX OF: Cystic fibrosis, Down syndrome, Other genetic     history, Other metabolic history            Past Surgical History      DENIES HX OF: Cataract extraction, Thyroid surgery, Lung biopsy, CABG surgery,     Coronary stent, Valve replacement, Appendectomy, Cholecystectomy, Splenectomy,     Bladder surgery, Nephrectomy, Joint replacement, Frature repair, Skin cancer     removal, Melanoma excision, Spinal surgery, Breast biopsy, Lumpectomy,     Mastectomy, bilateral, Mastectomy, right, Mastectomy, left, Hysterectomy, Peg     Tube Placement, VAD Placement, Biopsy, Other Past Surgical Hx            Family History      REPORTS HX OF: Colorectal cancer (MATERNAL GRANDMOTHER), Leukemia (MOTHER),     DENIES HX OF: Anemia, Blood disorders, Blood Cancer, Breast cancer, Cervical     cancer, Coagulopathy, Endocrine Cancer, Eye Cancer, GI Cancer,  Cancer,     Kidney Cancer, Leukocytosis, Leukopenia, Liver Cancer, Lung cancer, Lymphoma,     Melanoma, Musculoskeletal Cancer, Myeloma, Neurologic Cancer, Oral Cancer,     Ovarian cancer, Prostate cancer, Skin Cancer, Stomach Cancer, Testicular Cancer    , Thrombocytopenia, Thyroid cancer, Uterine cancer, Other Cancer History, Other     Hematology History            Social History      Yes            Tobacco Use      Tobacco status:  Never smoker            Alcohol Use      Alcohol intake:  None            Substance Use      Substance use:  Denies use            ROS      General:  Denies: Appetite change, Excessive sweating, Fatigue, Fever, Night     sweats, Weight gain, Weight loss,  Other      Eyes:  Denies: Blurred vision, Corrective lenses, Diplopia, Eye irritation, Eye     pain, Eye redness, Spots in vision, Vision loss, Other      Ears, nose, mouth, throat:  Denies: Headache, Seizures, Visual Changes, Hearing     loss, Sinus Congestion, Hoarseness, Sore throat, Other      Cardiovascular:  Denies: Chest pain, Irregular heartbeat, Palpitations, Swollen     ankles/legs, Other      Respiratory:  Denies: Chest pain, Shortness of Air, Productive cough, Coughing     blood, Other      Gastrointestinal:  Denies: Nausea, Vomiting, Problem swallowing, Frequent     heartburn, Constipation, Diarrhea, Tarry stools, Bloody stools, Unable to     control bowels, Other      Kidney/Bladder:  Denies: Painful Urination, Change in urinary stream, Blood in     urine, Incontinence, Frequent Urination, Decreased urine stream, Other      Musculoskeletal:  Denies: New Back pain, Leg Cramps, Painful Joints, Swollen     Joints, Muscle Pain, Muscle weakness, Other      Skin:  DENIES: Jaundice, Easy Bleeding, Lesions/changes in moles, Nail changes,     Skin Discoloration, Rash, Other      Neurological:  Denies: Dizziness, Fainting, Numbness\Tingling, Paralysis,     Seizures, Other      Psychiatric:  Complains of: AAO X 3, Denies: Anxiety, Panic attacks, Depression    , Memory loss, Other      Hematologic/lymphatic:  Denies: Bruising, Bleeding, Enlarged Lymph Nodes,     Recurrent infections, Other      Reproductive:  Denies Pregnant, Denies Menopause, Denies Still Menstruating,     Denies Heavy Periods, Denies Other            Vitals            Vital Signs              Date Time  Temp Pulse Resp B/P (MAP) Pulse Ox O2 Delivery O2 Flow Rate FiO2             3/14/18 14:23 97.9 88  134/62 97 ROOM AIR               2/22/18 13:02   20                 General Appearance:  Alert, Oriented X3, No acute distress      Eyes:  Anicteric Sclerae, Moist Conjunctiva      HEENT:  Orophraynx clear, No Erythema      Neck:  Supple, Full ROM       Respiratory:  CTAB, No Diminished Breath      Abdomen\Gastro:  Soft, No Masses      Cardio:  RRR, No Murmur, No, Peripheral Edema      Skin:  Normal Temperature, Normal Tone, No Rash, lesions, ulcers      Psychiatric:  Appropriate Affect, AAO x3      Neuro:  Cranial Nerve II-XII Inta, No Focal Sensory Deficit      Muscularskeletal:  Normal Gait and Station, Full ROM of extremeties      Extremities:  No Digital Cyanosis, No Digital Ischemia      Lymphatic:  No Cervical, No Supraclavicular, No Axillary            Hx Influenza Vaccination:  No      Influenza Vaccine Declined:  Yes      Hx Pneumococcal Vaccination:  No      Encouraged to follow-up with:  PCP regarding preventative exams.                 Disclaimer: Converted document may not contain table formatting or lab diagrams. Please see [email or web address] for the authenticated document.

## 2021-05-28 NOTE — PROGRESS NOTES
Patient: KONG FORBES     Acct: [id number]     Report: #[id number]  UNIT #: [id number]     : 1955    Encounter Date:2018  PRIMARY CARE: VILMA ZIEGLER DO  ***Signed***  --------------------------------------------------------------------------------------------------------------------  Chief Complaint      Breast Cancer      Intent of Therapy?:  Curative            Current Plan      -Lobular breast cancer      -T2 N1 M0.  Stage IIB. Right, upper, outer quadrant with 2 axillary nodes.        -CT chest, abdomen, and pelvis and bone scan was done.  No metastatic disease.      As an incidental finding of a 1.8 x 2 cm mass in the lower right renal pole.      It is also a 0.35 cm noncalcified nodule.  This is too small to characterize.      Radiologist recommended a CT follow-up in 12 months      -Patient was given a copy of the national cancer center network guidelines.  If     patient remains a locally advanced stage we discussed treatment options     including neoadjuvant versus adjuvant chemotherapy.  After discussion of risk     and benefits we have agreed for neoadjuvant chemotherapy.      -Patient will proceed with neoadjuvant chemotherapy.  Afterwards patient will     have surgery with Dr. Melissa Baig.            -Renal mass      -There is a 1.8 x 2 cm renal mass noted on CT abdomen pelvis on .      -Orders placed to follow-up with Dr. Mcintosh for further evaluation.  Appreciate     recommendations      -Patient will have surgery after chemotherapy.            -Today's Interim Plan      -Orders placed to see a genetic counselor due to likely to cancers.  Renal     cancer and breast cancer      -Chemotherapy next week            Medical Evaluation of Acute and Chronic Medical Conditions today      -Hyperglycemia      -Glucose 189 prior to chemotherapy      -Significant history of insulin-dependent diabetes      -Patient reports blood sugars will spike to 300-400 with  pre-treatment steroids     and chemotherapy treatment.        -Patient encouraged to discuss insulin coverage during treatment to manage     elevated blood sugar.      -Encouraged to exercise to promote lower blood sugar; as well as toussaint ongoing     fatigue.            Medical Evaluation of Chemotherapy Associated Toxicities Today      -Chemotherapy associated fatigue      -Patient's weakness has remained stable.      -Recommended Exercise.       -Continue close observation.      -Chemotherapy associated nausea      -Patient's nausea has remained stable.      -Currently on Anti-Nausea medications.       -Continue close observation.      -Chemotherapy associated anemia      -Does not require blood transfusion on today's visit.       -Transfuse blood when hemoglobin is less than 7.      -Continue close observation.      -Chemotherapy associated thrombocytopenia      -Does not require platelet transfusion on today's visit.       -Transfuse platelet when platelet count < 20,000.       -Continue close observation.      -Anxiety due to cancer      -Patient's anxiety is well controlled today.       -Continue current management.       -Today's Assessment      -ECOG 1.       -Patient's imaging exams, blood tests, physicians' notes, and any new findings     since our last visit were reviewed today to reassess patient's medical     treatment plan. .       -Old medical records were reviewed and summarized in chronological order in the     HPI today to maintain an updated medical record.       -Patient's radiology imaging tests from our last visit were reviewed     independently by me by direct visualization of the images.        -Patients current lab tests and medications were carefully reviewed to evaluate     patient's current treatment plan today. Proceed with chemotherapy plan.       -Patient was advised to call us right away if there are any new symptoms for an     urgent visit for further evaluation. Patient voiced  understanding and agreed to     do so.      Notes      New Medications      * Meth/Meblue/Sod Phos/Psal/Hyos (Uribel Capsule) 1 EACH CAPSULE: 1 EACH PO BID      * CRANBERRY EXT/C/L. SPOROGENES (Azo Cranberry) 1 EACH TABLET: 100 TAB PO Q4-6H     PRN PAIN/ABDOMINAL CRAMPS #30      * OLANZapine (ZyPREXA*) 10 MG TABLET: 10 MG PO QDAY 30 Days #30      New Diagnostics      * CBC, As Soon As Possible       Dx: Breast cancer - C50.919      * Comp Metabolic Panel, As Soon As Possible       Dx: Breast cancer - C50.919      Intensive Monitor for Toxicity:  Labs Reviewed, Chemo Orders Reviewd, Meds\    Narcotics Reviewed      Labs Reviewed During Visit?:  Yes      Time Spent:  > 50% /Coord Care      Patient Education Provided:  Yes            ECOG      ECOG Performance Status:  0            History and Present Illness      This is a very pleasant 62-year-old female with newly diagnosed breast cancer     and a kidney mass.            -February 9-2018.  Mammogram shows a suspicious 1.57 m spiculated mass at 10:00     in the right breast.  Irregular abnormal right axillary lymph node.      -February .  Ultrasound core needle biopsy returned as invasive lobular     carcinoma.  [place] histologic score 1.  Size of largest focus 13 mm.      Ancillary studies ER +80%.  [place] +70%.  Ki-67 40% HER-2/rhea negative.  Metastatic     lobular carcinoma involves lymph node.      -March 7-2018.  CT chest, abdomen, and pelvis showed a 1.8 cm x 2 sent a meter     heterogeneously enhancing exophytic lower pole of the right kidney.  There is     also a 0.35 cm noncalcified nodule in the left upper lobe.      -March 7-2018.  Bone scan showed no abnormal skeletal uptake.  Multifocal focal     degenerative uptake      -March 8-2018.  There is a 2.6 x 4 x 3.4 cm enhancing mass was spiculated     worsened compatible with no invasive lobular carcinoma.  There are 2 right     axillary lymph nodes are enlarged.      -March .  Cycle 1  day 1 Docetaxel Cytoxan      -April 10, 2018-Cycle 2 day 1 Docetaxel         Vitals      Height 5 ft 2.56 in / 158.9 cm      Weight 213 lbs 6.484 oz / 96.8 kg      BSA 2.12 m2      BMI 38.3 kg/m2      Temperature 98.7 F / 37.06 C - Temporal      Pulse 103      Blood Pressure 110/68 Sitting, Left Arm      Pulse Oximetry 99%, ROOM AIR            Allergies      Coded Allergies:             ERYTHROMYCIN BASE (Verified  Adverse Reaction, Unknown, Severe Headache, 4/    10/18)           SULFA (SULFONAMIDE ANTIBIOTICS) (Verified  Adverse Reaction, Unknown, 4/10/    18)       it does not work on her            Medications      Last Reconciled on 4/24/18 19:22 by ANNE MOODY MD      OLANZapine (ZyPREXA*) 10 Mg Tablet      10 MG PO QDAY for 30 Days, #30 TAB         Prov: Anne Moody         4/24/18       Cranberry Ext/C/L. Sporogenes (Azo Cranberry) 1 Each Tablet      100 TAB PO Q4-6H Y for PAIN/ABDOMINAL CRAMPS, #30 TAB         Reported         4/24/18       Meth/Meblue/Sod Phos/Psal/Hyos (Uribel Capsule) 1 Each Capsule      1 EACH PO BID, CAP         Reported         4/24/18       Citalopram HBr (CeleXA) 40 Mg Tablet      40 MG PO QDAY, #30 TAB 0 Refills         Prov: Anne Moody         4/3/18       Lisinopril* (Lisinopril*) 5 Mg Tablet      5 MG PO QDAY, #30 TAB 0 Refills         Prov: Anne Moody         4/3/18       Levothyroxine (Levothyroxine) 0.25 Mg Tablet      50 MCG PO QDAY@07, #30 TAB 0 Refills         Prov: Anne Moody         4/3/18       glipiZIDE (glipiZIDE*) 5 Mg Tablet      15 MG PO BID, #90 TAB 0 Refills         Prov: Anne Moody         4/3/18       metFORMIN HCl (Glucophage*) 1,000 Mg Tablet      1 TAB PO BID for 30 Days, #60 TAB 0 Refills         Prov: Anne Moody         4/3/18       Mouthwash (Magic Mouthwash) 1 Each Bottle      15 ML PO Q4H Y for SORENESS, ML         Reported         4/3/18       Acetaminophen* (Tylenol Extra Strength*) 500 Mg Tablet      500 MG PO Q4-6H Y for PAIN  OR FEVER, #100 TAB 0 Refills         Reported         4/3/18       Miconazole Nitrate (Miconazole 7 Vag Cream) 45 Gm Cream.appl      1 APL VAG HS, #1 TUBE         Prov: ERIC PARIS onc         3/27/18       Cholecalciferol (Vitamin D3*) 2,000 U Tablet      2000 UNITS PO QDAY, #30 TAB 0 Refills         Reported         3/14/18       Biotin (Biotin) 2,500 Mcg Cap      1000 MCG PO QDAY, #30 CAP         Reported         3/14/18       Prochlorperazine (Compazine*) 10 Mg/2 Ml Vial      10 MG IM Q6H Y for NAUSEA AND/OR VOMITING, VIAL         Reported         3/14/18       Dexamethasone (Dexamethasone) 2 Mg Tab      4 MG PO BID, TAB         Reported         3/14/18       Hydrocodone/Acetaminophen 5/325 MG (Norco 5/325 Mg) 1 Tab Tab      2 TAB PO Q4-6H Y for pain, #15 TAB 0 Refills         Prov: Antoinette Baig         3/13/18       Ondansetron (Ondansetron ODT*) 8 Mg Tab.rapdis      8 MG PO Q8H Y for NAUSEA, #24 TAB.RAPDIS 3 Refills         Prov: Herbert Moody         3/13/18       diphenhydrAMINE HCl (Benadryl*) 25 Mg Capsule      25 MG PO Q4-6H Y for ALLERGIES, #100 TAB         Reported         3/12/18       Potassium (Potassium) 99 Mg Tablet      3 PO TID, TAB         Reported         3/9/18       Magnesium Amino Acid Chelate (Magnesium*) 100 Mg Tablet      250 MCG PO QDAY, TAB         Reported         3/9/18       Insulin Asp/Prt/Insulin Aspart (NovoLOG Mix 70/30 VIAL*) 100 Units/Ml Vial      20 UNITS SUBQ BID INSULIN, #1 VIAL 0 Refills         Reported         3/30/17       Cetirizine Hcl (ZyrTEC*) 10 Mg Tablet      1 TAB PO QDAY, TAB         Reported         5/6/14            General Information      Provider Not Found in Lookup:  ANTOINETTE ARCE      [facility] Provider 2:  Antoinette Baig      [facility] Provider 3:  Pita Mcintosh            Pain and Fatigue Scales      Fatigue:           Experiencing any fatigue?:  Yes         Fatigue (0-10 scale):  8            Chemo Status      On Oral Chemotherapy?:  No            PAST,  FAMILY   Past Medical History      No Diabetes Type 1, Yes Diabetes Type 2, Yes Thyroid Disease, No COPD, No     Emphysema, No Hypertension, No Stroke, No High Cholesterol, No Heart Attack, No     Bleeding Condition, No Low or High RBC Count, No Low or High WBC Count, No Low     or High Platelet Coun, No Hepatitis, No Kidney Disease, No Depression, No     Alzheimer's Disease, No Mental Disease, No Seizures, No Arthritis, No     Osteoporosis, No Osteopenia, No Short of Air, No Sleep apnea, No Liver Disease,     No STD, No Enlarged Prostate, Yes Other (INTERSISTAL CYSTITIS/HIATAL HERNIA)      Hematology/oncology:  DENIES HX OF: Previous Treatment for CA, Anemia, Bladder     Cancer, Blood cancer, Brain cancer, Breast cancer, Cervical cancer, Coagulopathy    , Colorectal cancer, Endocrine cancer, Eye cancer, GI cancer,  cancer, Kidney     cancer, Leukemia, Leukocytosis, Leukopenia, Liver cancer, Lung cancer, Lymphoma    , Musculoskeletal cancer, Myeloma, Neurologic cancer, Oral cancer, Ovarian     cancer, Skin cancer, Stomach cancer, Thrombocytopenia, Thyroid cancer, Uterine     cancer, Other cancer history, Other hematologic history      Genetic/metabolic:  DENIES HX OF: Cystic fibrosis, Down syndrome, Other genetic     history, Other metabolic history            Past Surgical History      DENIES HX OF: Cataract extraction, Thyroid surgery, Lung biopsy, CABG surgery,     Coronary stent, Valve replacement, Appendectomy, Cholecystectomy, Splenectomy,     Bladder surgery, Nephrectomy, Joint replacement, Frature repair, Skin cancer     removal, Melanoma excision, Spinal surgery, Breast biopsy, Lumpectomy,     Mastectomy, bilateral, Mastectomy, right, Mastectomy, left, Hysterectomy, Peg     Tube Placement, VAD Placement, Biopsy, Other Past Surgical Hx            Family History      REPORTS HX OF: Colorectal cancer (MATERNAL GRANDMOTHER), Leukemia (MOTHER),     DENIES HX OF: Anemia, Blood disorders, Blood Cancer, Breast  cancer, Cervical     cancer, Coagulopathy, Endocrine Cancer, Eye Cancer, GI Cancer,  Cancer,     Kidney Cancer, Leukocytosis, Leukopenia, Liver Cancer, Lung cancer, Lymphoma,     Melanoma, Musculoskeletal Cancer, Myeloma, Neurologic Cancer, Oral Cancer,     Ovarian cancer, Prostate cancer, Skin Cancer, Stomach Cancer, Testicular Cancer    , Thrombocytopenia, Thyroid cancer, Uterine cancer, Other Cancer History, Other     Hematology History            Social History      Yes            Tobacco Use      Tobacco status:  Never smoker            Alcohol Use      Alcohol intake:  None            Substance Use      Substance use:  Denies use            REVIEW OF SYSTEMS      General:  Complains of: Fatigue, Denies: Appetite change, Excessive sweating,     Fever, Night sweats, Weight gain, Weight loss, Other      Eyes:  Denies: Blurred vision, Corrective lenses, Diplopia, Eye irritation, Eye     pain, Eye redness, Spots in vision, Vision loss, Other      Ears, nose, mouth, throat:  Denies: Headache, Seizures, Visual Changes, Hearing     loss, Sinus Congestion, Hoarseness, Sore throat, Other      Cardiovascular:  Denies: Chest pain, Irregular heartbeat, Palpitations, Swollen     ankles/legs, Other      Respiratory:  Denies: Chest pain, Shortness of Air, Productive cough, Coughing     blood, Other      Gastrointestinal:  Denies: Nausea, Vomiting, Problem swallowing, Frequent     heartburn, Constipation, Diarrhea, Tarry stools, Bloody stools, Unable to     control bowels, Other      Kidney/Bladder:  Denies: Painful Urination, Change in urinary stream, Blood in     urine, Incontinence, Frequent Urination, Decreased urine stream, Other      Musculoskeletal:  Denies: New Back pain, Leg Cramps, Painful Joints, Swollen     Joints, Muscle Pain, Muscle weakness, Other      Skin:  COMPLAINS OF: Rash (UNDER BOTH BREASTS), Other (DRY SKIN ON HER HANDS ),     DENIES: Jaundice, Easy Bleeding, Lesions/changes in moles, Nail changes,  Skin     Discoloration      Neurological:  Denies: Dizziness, Fainting, Numbness\Tingling, Paralysis,     Seizures, Other      Psychiatric:  Complains of: AAO X 3, Denies: Anxiety, Panic attacks, Depression    , Memory loss, Other      Endocrine:  DiabetesThyroid DisorderOsteoporosisEndocrine Other      Hematologic/lymphatic:  Denies: Bruising, Bleeding, Enlarged Lymph Nodes,     Recurrent infections, Other      Reproductive:  Denies Pregnant, Denies Menopause, Denies Still Menstruating,     Denies Heavy Periods, Denies Other            Vitals            Vital Signs              Date Time  Temp Pulse Resp B/P (MAP) Pulse Ox O2 Delivery O2 Flow Rate FiO2             4/10/18 09:15 97.3 97  116/64 100 ROOM AIR               3/20/18 09:15   20                 General Appearance:  Alert, Oriented X3, Cooperative, No acute distress      Eyes:  Anicteric Sclerae, Moist Conjunctiva      HEENT:  Orophraynx clear, No Erythema, No Pallor, No Thrush      Neck:  Supple, Full ROM, No Masses or JVD      Respiratory:  CTAB, No Diminished Breath, No Rales, No Crackels, No Rhonchi      Breast\Chest:  Symmetrical      Abdomen\Gastro:  Soft, No Masses, No Hepatosplenomegaly      Cardio:  RRR, No Murmur, No, Peripheral Edema      Skin:  Normal Temperature, Normal Tone, Normal Texture and Turgor, No Rash,     lesions, ulcers      Psychiatric:  Appropriate Affect, Intact Judgement, AAO x3      Neuro:  Cranial Nerve II-XII Inta, No Focal Sensory Deficit      Genitourinary:  No Mazariegos Catheter      Muscularskeletal:  Normal Gait and Station, Full ROM of extremeties, Full     muscle strength\tone      Extremities:  No Digital Cyanosis, No Digital Ischemia      Lymphatic:  No Cervical, No Supraclavicular, No Infraclavicular, No Axillary,     No Inguinal            Lab Results      Laboratory Tests      4/10/18 09:05            PREVENTION      Hx Influenza Vaccination:  No      Influenza Vaccine Declined:  Yes      2 or More Falls Past Year?:   No      Fall Past Year with Injury?:  No      Hx Pneumococcal Vaccination:  No      Encouraged to follow-up with:  PCP regarding preventative exams.      Chart initiated by      KIZZY NORMAN CMA                 Disclaimer: Converted document may not contain table formatting or lab diagrams. Please see [employer] for the authenticated document.

## 2021-05-28 NOTE — PROGRESS NOTES
Patient: KONG FORBES     Acct: [id number]     Report: #[id number]  UNIT #: [id number]     : 1955    Encounter Date:2018  PRIMARY CARE: VILMA ZIEGLER DO  ***Signed***  --------------------------------------------------------------------------------------------------------------------  Chief Complaint      Mar 14, 2018      BREAST CANCER      Breast Cancer      Intent of Therapy?:  Curative            Current Plan      -Lobular breast cancer      -T2 N1 M0.  Stage IIB      -CT chest, abdomen, and pelvis and bone scan was done.  No metastatic disease.      As an incidental finding of a 1.8 x 2 cm mass in the lower right renal pole.      It is also a 0.35 cm noncalcified nodule.  This is too small to characterize.      Radiologist recommended a CT follow-up in 12 months      -Patient was given a copy of the national cancer center network guidelines.  If     patient remains a locally advanced stage we discussed treatment options     including neoadjuvant versus adjuvant chemotherapy.  After discussion of risk     and benefits we have agreed for neoadjuvant chemotherapy.      -Patient will proceed with neoadjuvant chemotherapy.  Afterwards patient will     have surgery with Dr. Melissa Baig.            -Renal mass      -There is a 1.8 x 2 cm renal mass noted on CT abdomen pelvis on .      -Orders placed to follow-up with Dr. Mcintosh for further evaluation.  Appreciate     recommendations      -Patient will have surgery after chemotherapy.            Medical Evaluation of Cancer Associated Symptoms today      -Cancer associated pain      -Patient's pain has remained stable.      -Continue current pain regimen.      -Cancer associated muscle weakness      -Patient's weakness has remained stable.      -Continue close observation.      -Cancer associated fatigue      -Patient's fatigue has remained stable.      -Recommended exercise.      -Anxiety due to cancer      -Patient's anxiety is  well controlled today.       -Continue current management.       -Today's Assessment      -ECOG 1.       -Patient's imaging exams, blood tests, physicians' notes, and any new findings     since our last visit were reviewed today to reassess patient's medical     treatment plan. .       -Old medical records were reviewed and summarized in chronological order in the     HPI today to maintain an updated medical record.       -Patient's radiology imaging tests from our last visit were reviewed     independently by me by direct visualization of the images.        -Patients current lab tests and medications were carefully reviewed to evaluate     patient's current treatment plan today.       -Patient was advised to call us right away if there are any new symptoms for an     urgent visit for further evaluation. Patient voiced understanding and agreed to     do so.      Breast cancer - C50.919            Renal mass - N28.89            Notes      New Medications      * Dexamethasone 2 MG TAB: 4 MG PO BID       Instructions: TAKE 2 TABLETS BY MOUTH TWICE A DAY ON DAYS 2,3, AND 4 AFTER     CHEMOTHERAPY      * Prochlorperazine (Compazine*) 10 MG/2 ML VIAL: 10 MG IM Q6H PRN NAUSEA AND/OR     VOMITING      * BIOTIN (Biotin) 2,500 MCG CAP: 1,000 MCG PO QDAY #30      * Cholecalciferol (Vitamin D3*) 2,000 U TABLET: 2,000 UNITS PO QDAY #30      Changed Medications      * glipiZIDE (glipiZIDE*) 5 MG TABLET: 15 MG PO BID #90      Time Spent:  > 50% /Coord Care      Patient Education Provided:  Yes            History and Present Illness      This is a very pleasant 62-year-old female with newly diagnosed breast cancer     and a kidney mass.            -February 9-2018.  Mammogram shows a suspicious 1.57 m spiculated mass at 10:00     in the right breast.  Irregular abnormal right axillary lymph node.      -February .  Ultrasound core needle biopsy returned as invasive lobular     carcinoma.  Rochelle histologic score 1.   Size of largest focus 13 mm.      Ancillary studies ER +80%.  PA +70%.  Ki-67 40% HER-2/rhea negative.  Metastatic     lobular carcinoma involves lymph node.      -March 7-2018.  CT chest, abdomen, and pelvis showed a 1.8 cm x 2 sent a meter     heterogeneously enhancing exophytic lower pole of the right kidney.  There is     also a 0.35 cm noncalcified nodule in the left upper lobe.      -March 7-2018.  Bone scan showed no abnormal skeletal uptake.  Multifocal focal     degenerative uptake      -March 8-2018.  There is a 2.6 x 4 x 3.4 cm enhancing mass was spiculated     worsened compatible with no invasive lobular carcinoma.  There are 2 right     axillary lymph nodes are enlarged.            Vitals      Height 5 ft 2.56 in / 158.9 cm      Weight 213 lbs 2.957 oz / 96.7 kg      BSA 2.11 m2      BMI 38.3 kg/m2      Temperature 97.9 F / 36.61 C - Temporal      Pulse 88      Blood Pressure 134/62 Sitting, Left Arm      Pulse Oximetry 97%, ROOM AIR            Allergies      Coded Allergies:             ERYTHROMYCIN BASE (Verified  Adverse Reaction, Unknown, Severe Headache, 3/    14/18)           SULFA (SULFONAMIDE ANTIBIOTICS) (Verified  Adverse Reaction, Unknown, 3/14/    18)       it does not work on her            Medications      Last Reconciled on 3/20/18 00:18 by ANNE NARVAEZ MD      Cholecalciferol (Vitamin D3*) 2,000 U Tablet      2000 UNITS PO QDAY, #30 TAB 0 Refills         Reported         3/14/18       Biotin (Biotin) 2,500 Mcg Cap      1000 MCG PO QDAY, #30 CAP         Reported         3/14/18       Prochlorperazine (Compazine*) 10 Mg/2 Ml Vial      10 MG IM Q6H Y for NAUSEA AND/OR VOMITING, VIAL         Reported         3/14/18       Dexamethasone (Dexamethasone) 2 Mg Tab      4 MG PO BID, TAB         Reported         3/14/18       Hydrocodone/Acetaminophen 5/325 MG (Norco 5/325 Mg) 1 Tab Tab      2 TAB PO Q4-6H Y for pain, #15 TAB 0 Refills         Prov: Melissa Baig         3/13/18        Ondansetron (Ondansetron ODT*) 8 Mg Tab.rapdis      8 MG PO Q8H Y for NAUSEA, #24 TAB.RAPDIS 3 Refills         Prov: Herbert Moody         3/13/18       diphenhydrAMINE HCl (Benadryl*) 25 Mg Capsule      25 MG PO Q4-6H Y for ALLERGIES, #100 TAB         Reported         3/12/18       Ergocalciferol (Ergocalciferol*) 50,000 Unit Capsule      22854 UNITS PO MO@09, #4 CAP 0 Refills         Reported         3/12/18       Citalopram HBr (CeleXA) 40 Mg Tablet      40 MG PO QDAY, #30 TAB 0 Refills         Reported         3/12/18       Lisinopril* (Lisinopril*) 5 Mg Tablet      5 MG PO QDAY, #30 TAB 0 Refills         Reported         3/12/18       Potassium (Potassium) 99 Mg Tablet      3 PO TID, TAB         Reported         3/9/18       Magnesium Amino Acid Chelate (Magnesium*) 100 Mg Tablet      250 MCG PO QDAY, TAB         Reported         3/9/18       Insulin Asp/Prt/Insulin Aspart (NovoLOG Mix 70/30 VIAL*) 100 Units/Ml Vial      20 UNITS SUBQ BID INSULIN, #1 VIAL 0 Refills         Reported         3/30/17       Levothyroxine (Levothyroxine) 0.25 Mg Tablet      50 MCG PO QDAY@07, #30 TAB 0 Refills         Reported         3/30/17       glipiZIDE (glipiZIDE*) 5 Mg Tablet      15 MG PO BID, #90 TAB 0 Refills         Reported         3/30/17       Cetirizine Hcl (ZyrTEC*) 10 Mg Tablet      1 TAB PO QDAY, TAB         Reported         5/6/14       metFORMIN HCl (Glucophage*) 1,000 Mg Tablet      1 TAB PO BID, TAB 0 Refills         Reported         8/21/09            General Information      Provider Not Found in Lookup:  ANTOINETTE ARCE      [facility] Provider 2:  Antoinette Baig      [facility] Provider 3:  Pita Mcintosh            Past Medical History      No Diabetes Type 1, Yes Diabetes Type 2, No Thyroid Disease, No COPD, No     Emphysema, No Hypertension, No Stroke, No High Cholesterol, No Heart Attack, No     Bleeding Condition, No Low or High RBC Count, No Low or High WBC Count, No Low     or High Platelet Coun, No Hepatitis, No  Kidney Disease, No Depression, No     Alzheimer's Disease, No Mental Disease, No Seizures, No Arthritis, No     Osteoporosis, No Osteopenia, No Short of Air, No Sleep apnea, No Liver Disease,     No STD, No Enlarged Prostate, Yes Other (INTERSISTAL CYSTITIS/HIATAL HERNIA)      Hematology/oncology:  DENIES HX OF: Previous Treatment for CA, Anemia, Bladder     Cancer, Blood cancer, Brain cancer, Breast cancer, Cervical cancer, Coagulopathy    , Colorectal cancer, Endocrine cancer, Eye cancer, GI cancer,  cancer, Kidney     cancer, Leukemia, Leukocytosis, Leukopenia, Liver cancer, Lung cancer, Lymphoma    , Musculoskeletal cancer, Myeloma, Neurologic cancer, Oral cancer, Ovarian     cancer, Skin cancer, Stomach cancer, Thrombocytopenia, Thyroid cancer, Uterine     cancer, Other cancer history, Other hematologic history      Genetic/metabolic:  DENIES HX OF: Cystic fibrosis, Down syndrome, Other genetic     history, Other metabolic history            Past Surgical History      DENIES HX OF: Cataract extraction, Thyroid surgery, Lung biopsy, CABG surgery,     Coronary stent, Valve replacement, Appendectomy, Cholecystectomy, Splenectomy,     Bladder surgery, Nephrectomy, Joint replacement, Frature repair, Skin cancer     removal, Melanoma excision, Spinal surgery, Breast biopsy, Lumpectomy,     Mastectomy, bilateral, Mastectomy, right, Mastectomy, left, Hysterectomy, Peg     Tube Placement, VAD Placement, Biopsy, Other Past Surgical Hx            Family History      REPORTS HX OF: Colorectal cancer (MATERNAL GRANDMOTHER), Leukemia (MOTHER),     DENIES HX OF: Anemia, Blood disorders, Blood Cancer, Breast cancer, Cervical     cancer, Coagulopathy, Endocrine Cancer, Eye Cancer, GI Cancer,  Cancer,     Kidney Cancer, Leukocytosis, Leukopenia, Liver Cancer, Lung cancer, Lymphoma,     Melanoma, Musculoskeletal Cancer, Myeloma, Neurologic Cancer, Oral Cancer,     Ovarian cancer, Prostate cancer, Skin Cancer, Stomach Cancer,  Testicular Cancer    , Thrombocytopenia, Thyroid cancer, Uterine cancer, Other Cancer History, Other     Hematology History            Social History      Yes            Tobacco Use      Tobacco status:  Never smoker            Alcohol Use      Alcohol intake:  None            Substance Use      Substance use:  Denies use            ROS      General:  Denies: Appetite change, Excessive sweating, Fatigue, Fever, Night     sweats, Weight gain, Weight loss, Other      Eyes:  Denies: Blurred vision, Corrective lenses, Diplopia, Eye irritation, Eye     pain, Eye redness, Spots in vision, Vision loss, Other      Ears, nose, mouth, throat:  Denies: Headache, Seizures, Visual Changes, Hearing     loss, Sinus Congestion, Hoarseness, Sore throat, Other      Cardiovascular:  Denies: Chest pain, Irregular heartbeat, Palpitations, Swollen     ankles/legs, Other      Respiratory:  Denies: Chest pain, Shortness of Air, Productive cough, Coughing     blood, Other      Gastrointestinal:  Denies: Nausea, Vomiting, Problem swallowing, Frequent     heartburn, Constipation, Diarrhea, Tarry stools, Bloody stools, Unable to     control bowels, Other      Kidney/Bladder:  Denies: Painful Urination, Change in urinary stream, Blood in     urine, Incontinence, Frequent Urination, Decreased urine stream, Other      Musculoskeletal:  Denies: New Back pain, Leg Cramps, Painful Joints, Swollen     Joints, Muscle Pain, Muscle weakness, Other      Skin:  DENIES: Jaundice, Easy Bleeding, Lesions/changes in moles, Nail changes,     Skin Discoloration, Rash, Other      Neurological:  Denies: Dizziness, Fainting, Numbness\Tingling, Paralysis,     Seizures, Other      Psychiatric:  Complains of: AAO X 3, Denies: Anxiety, Panic attacks, Depression    , Memory loss, Other      Endocrine:  DiabetesThyroid DisorderOsteoporosisEndocrine Other      Hematologic/lymphatic:  Denies: Bruising, Bleeding, Enlarged Lymph Nodes,     Recurrent infections, Other       Reproductive:  Denies Pregnant, Denies Menopause, Denies Still Menstruating,     Denies Heavy Periods, Denies Other            Vitals            Vital Signs              Date Time  Temp Pulse Resp B/P (MAP) Pulse Ox O2 Delivery O2 Flow Rate FiO2             3/9/18 13:09 97.7 89   100 ROOM AIR               2/22/18 13:02   20 136/70                General Appearance:  Alert, Oriented X3, No acute distress      Eyes:  Anicteric Sclerae, Moist Conjunctiva      HEENT:  Orophraynx clear, No Erythema      Neck:  Supple, Full ROM      Respiratory:  CTAB, No Diminished Breath      Abdomen\Gastro:  Soft, No Masses      Cardio:  RRR, No Murmur, No, Peripheral Edema      Skin:  Normal Temperature, Normal Tone, No Rash, lesions, ulcers      Psychiatric:  Appropriate Affect, AAO x3      Neuro:  Cranial Nerve II-XII Inta, No Focal Sensory Deficit      Muscularskeletal:  Normal Gait and Station, Full ROM of extremeties      Extremities:  No Digital Cyanosis, No Digital Ischemia      Lymphatic:  No Cervical, No Supraclavicular, No Axillary            Hx Influenza Vaccination:  No      Influenza Vaccine Declined:  Yes      2 or More Falls Past Year?:  No      Fall Past Year with Injury?:  No      Hx Pneumococcal Vaccination:  No      Encouraged to follow-up with:  PCP regarding preventative exams.      Chart initiated by      KIZZY NORMAN CMA                 Disclaimer: Converted document may not contain table formatting or lab diagrams. Please see [facility] for the authenticated document.

## 2021-05-28 NOTE — PROGRESS NOTES
Patient: KONG FORBES     Acct: [id number]     Report: #[id number]  UNIT #: [id number]     : 1955    Encounter Date:2018  PRIMARY CARE: VILMA ZIEGLER DO  ***Signed***  --------------------------------------------------------------------------------------------------------------------  Visit Type      Established Patient Visit            Chief Complaint      BREAST CANCER            Referring Provider/Copies To      Referring Provider:  Pita Mcintosh      Provider Not Found in Lookup:  ANTOINETTE ARCE            Allergies      Coded Allergies:             ERYTHROMYCIN BASE (Verified  Adverse Reaction, Unknown, Severe Headache,     18)            Medications      Last Reconciled on 18 12:35 by ERIC PARIS      Insulin Asp/Prt/Insulin Aspart (NovoLOG Mix 70/30 FLEXPEN*) 100 Unit/1 Ml     Insuln.pen      25 UNITS SUBQ BID INSULIN for 30 Days, #1 BOX 2 Refills         Prov: ERIC PARIS onc         18       glipiZIDE (glipiZIDE*) 5 Mg Tablet      15 MG PO BID, #180 TAB 2 Refills         Prov: ERIC PARIS onc         18       Citalopram HBr (CeleXA) 40 Mg Tablet      40 MG PO QDAY, #30 TAB 2 Refills         Prov: ERIC PARIS         18       Lisinopril* (Lisinopril*) 5 Mg Tablet      5 MG PO QDAY, #30 TAB 2 Refills         Prov: ERIC PARIS         18       Levothyroxine (Levothyroxine) 0.25 Mg Tablet      50 MCG PO QDAY@07, #30 TAB 2 Refills         Prov: ERIC PARIS         18       Metformin HCl (Glucophage) 1,000 Mg Tablet      1 TAB PO BID for 30 Days, #60 TAB 2 Refills         Prov: ERIC PARIS onc         18       ARIPiprazole (Abilify*) Unknown Strength Tab      PO QDAY, #30 TAB         Reported         18       Acetaminophen* (Tylenol Extra Strength*) 500 Mg Tablet      500 MG PO Q4-6H PRN for PAIN OR FEVER, #100 TAB 0 Refills         Reported         4/3/18       Cholecalciferol (Vitamin  D3*) 2,000 U Tablet      2000 UNITS PO QDAY, #30 TAB 0 Refills         Reported         3/14/18       Biotin (Biotin) 2,500 Mcg Cap      1000 MCG PO QDAY, #30 CAP         Reported         3/14/18       diphenhydrAMINE HCl (Benadryl*) 25 Mg Capsule      25 MG PO Q4-6H PRN for ALLERGIES, #100 TAB         Reported         3/12/18       Potassium (Potassium) 99 Mg Tablet      99 MG PO TID, TAB         Reported         3/9/18       Magnesium Amino Acid Chelate (Magnesium*) 100 Mg Tablet      250 MCG PO QDAY, TAB         Reported         3/9/18       Insulin Asp/Prt/Insulin Aspart (NovoLOG Mix 70/30 VIAL*) 100 Units/Ml Vial      25 UNITS SUBQ BID INSULIN, #1 VIAL 0 Refills         Reported         3/30/17       Cetirizine Hcl (ZyrTEC*) 10 Mg Tablet      10 MG PO QDAY, TAB         Reported         5/6/14      Medications Reviewed:  Changes made to meds            History and Present Illness      Past Oncology Illness History      This is a very pleasant 62-year-old female with newly diagnosed breast cancer     and a kidney mass.            -February 9-2018.  Mammogram shows a suspicious 1.5 cm spiculated mass at 10:00     in the right breast.  Irregular abnormal right axillary lymph node.      -February .  Ultrasound core needle biopsy returned as invasive lobular     carcinoma.  Yosvany histologic score 1.  Size of largest focus 13 mm.      Ancillary studies ER +80%.  KS +70%.  Ki-67 40% HER-2/rhea negative.  Metastatic     lobular carcinoma involves lymph node.      -March 7-2018.  CT chest, abdomen, and pelvis showed a 1.8 cm x 2 sent a meter     heterogeneously enhancing exophytic lower pole of the right kidney.  There is     also a 0.35 cm noncalcified nodule in the left upper lobe.      -March 7-2018.  Bone scan showed no abnormal skeletal uptake.  Multifocal focal     degenerative uptake      -March 8-2018.  There is a 2.6 x 4 x 3.4 cm enhancing mass was spiculated     worsened compatible with no invasive  lobular carcinoma.  There are 2 right     axillary lymph nodes are enlarged.      -March -May 2018.  Completed 4 cycles of TC      -July .  Patient had a right sentinel node and right lumpectomy.      Pathology revealed invasive lobular carcinoma.  Negative margins.  Number of     lymph nodes with macro metastases 2.  ER 70%.  MA 80%.  Ki-67 2%.  Tumor size 3     cm.      -September 13, 2018. Presents for follow up for breast cancer. Patient has had 2    radiation consults and has not been to either consult  secondary to poor     transportation to see Dr. Aguiar. Discussed with patient regarding the     importance of possible radiation to the breast. Breast exam completed and did     palpate a lump in the right upper breast about the 10:00 to 12:00 position.     Patient reports she has felt an area to same location. She is looking for new     PCP since hers has left [facility].Requests home meds be filled until she     finds new PCP. Did have surgery for right tumor to the right kidney which she     states was benign. Has follow up appointment with Dr. Mcintosh today.            ECOG Performance Status      1            PAST, FAMILY   Past Medical History      Past Medical History:  No Diabetes Type 1; Diabetes Type 2, Thyroid Disease; No     COPD, No Emphysema, No Hypertension, No Stroke, No High Cholesterol, No Heart     Attack, No Bleeding Condition, No Low or High RBC Count, No Low or High WBC     Count, No Low or High Platelet Coun, No Hepatitis, No Kidney Disease, No     Depression, No Alzheimer's Disease, No Mental Disease, No Seizures, No     Arthritis, No Osteoporosis, No Osteopenia, No Short of Air, No Sleep apnea, No     Liver Disease, No STD, No Enlarged Prostate; Other (INTERSISTAL CYSTITIS/HIATAL     HERNIA)      Hematology/oncology:  DENIES HX OF: Previous Treatment for CA, Anemia, Bladder     Cancer, Blood cancer, Brain cancer, Breast cancer, Cervical cancer,     Coagulopathy,  Colorectal cancer, Endocrine cancer, Eye cancer, GI cancer,      cancer, Kidney cancer, Leukemia, Leukocytosis, Leukopenia, Liver cancer, Lung     cancer, Lymphoma, Musculoskeletal cancer, Myeloma, Neurologic cancer, Oral     cancer, Ovarian cancer, Skin cancer, Stomach cancer, Thrombocytopenia, Thyroid     cancer, Uterine cancer, Other cancer history, Other hematologic history      Genetic/metabolic:  DENIES HX OF: Cystic fibrosis, Down syndrome, Other genetic     history, Other metabolic history            Past Surgical History      DENIES HX OF: Cataract extraction, Thyroid surgery, Lung biopsy, CABG surgery,     Coronary stent, Valve replacement, Appendectomy, Cholecystectomy, Splenectomy,     Bladder surgery, Nephrectomy, Joint replacement, Frature repair, Skin cancer     removal, Melanoma excision, Spinal surgery, Breast biopsy, Lumpectomy,     Mastectomy, bilateral, Mastectomy, right, Mastectomy, left, Hysterectomy, Peg     Tube Placement, VAD Placement, Biopsy, Other Past Surgical Hx            Family History      REPORTS HX OF: Colorectal cancer (MATERNAL GRANDMOTHER), Leukemia (MOTHER);     DENIES HX OF: Anemia, Blood disorders, Blood Cancer, Breast cancer, Cervical     cancer, Coagulopathy, Endocrine Cancer, Eye Cancer, GI Cancer,  Cancer, Kidney    Cancer, Leukocytosis, Leukopenia, Liver Cancer, Lung cancer, Lymphoma, Melanoma,    Musculoskeletal Cancer, Myeloma, Neurologic Cancer, Oral Cancer, Ovarian cancer,    Prostate cancer, Skin Cancer, Stomach Cancer, Testicular Cancer,     Thrombocytopenia, Thyroid cancer, Uterine cancer, Other Cancer History, Other     Hematology History            Social History      Lives independently:  Yes            Tobacco Use      Tobacco status:  Never smoker            Alcohol Use      Alcohol intake:  None            Substance Use      Substance use:  Denies use            REVIEW OF SYSTEMS      General:  Complains of: Fatigue; Denies: Appetite change, Excessive  sweating,     Fever, Night sweats, Weight gain, Weight loss, Other      Eyes:  Denies: Blurred vision, Corrective lenses, Diplopia, Eye irritation, Eye     pain, Eye redness, Spots in vision, Vision loss, Other      Ears, nose, mouth, throat:  Denies: Headache, Seizures, Visual Changes, Hearing     loss, Sinus Congestion, Hoarseness, Sore throat, Other      Cardiovascular:  Denies: Chest pain, Irregular heartbeat, Palpitations, Swollen     ankles/legs, Other      Respiratory:  Denies: Chest pain, Shortness of Air, Productive cough, Coughing     blood, Other      Gastrointestinal:  Denies: Nausea, Vomiting, Problem swallowing, Frequent     heartburn, Constipation, Diarrhea, Tarry stools, Bloody stools, Unable to     control bowels, Other      Kidney/Bladder:  Denies: Painful Urination, Change in urinary stream, Blood in     urine, Incontinence, Frequent Urination, Decreased urine stream, Other      Musculoskeletal:  Complains of: Other (right foot in cast and boot due to     fracture. ); Denies: New Back pain, Leg Cramps, Painful Joints, Swollen Joints,     Muscle Pain, Muscle weakness      Skin:  DENIES: Jaundice, Easy Bleeding, Lesions/changes in moles, Nail changes,     Skin Discoloration, Rash, Other      Neurological:  Denies: Dizziness, Fainting, Numbness\Tingling, Paralysis, S    eizures, Other      Psychiatric:  Complains of: AAO X 3; Denies: Anxiety, Panic attacks, Depression,    Memory loss, Other      Endocrine:  DiabetesThyroid DisorderOsteoporosisEndocrine Other      Hematologic/lymphatic:  Denies: Bruising, Bleeding, Enlarged Lymph Nodes,     Recurrent infections, Other      Reproductive:  Complains of Menopause; Denies Pregnant, Denies Still     Menstruating, Denies Heavy Periods, Denies Other            VITAL SIGNS,PAIN/FATIGUE SCORE      Vitals      Height 5 ft 2.56 in / 158.9 cm      Weight 210 lbs 1.574 oz / 95.3 kg      BSA 2.10 m2      BMI 37.7 kg/m2      Temperature 97.8 F / 36.56 C - Temporal       Pulse 102      Blood Pressure 145/69 Sitting, Left Arm      Pulse Oximetry 100%, RM AIR            Pain Score      Experiencing any pain?:  Yes      Pain Scale Used:  Numerical      Pain Intensity:  4            Fatigue Score      Experiencing any fatigue?:  Yes      Fatigue (0-10 scale):  7            EXAM      General Appearance:  Alert, Oriented X3, Cooperative      Eyes:  Moist Conjunctiva, PERRLA      HEENT:  Orophraynx clear, No Erythema, No Exudates      Neck:  Supple, Full ROM      Respiratory:  CTAB; No Diminished Breath      Breast - Left:  No Adenopathy, No Appearance, No Discharge, No Mass, No Skin     Changes      Breast - Right:  Mass (palpated a right lump to the 10 to 12 position of breast.    ), Skin Changes (post op healing has occurred to the right breast. )      Abdomen\Gastro:  Soft, No NABS      Cardio:  RRR, No Murmur, No, Peripheral Edema      Skin:  Normal Temperature, Normal Tone, Normal Texture and Turgor, No Rash,     lesions, ulcers      Psychiatric:  Appropriate Affect, Intact Judgement, AAO x3      Neuro:  Cranial Nerve II-XII Inta, No Focal Sensory Deficit      Muscularskeletal:  Normal Gait and Station, Full ROM of extremeties      Extremities:  No Digital Cyanosis, Pedal Pulses Intact, Pedal Pulses Symetrical      Lymphatic:  No Cervical, No Axillary            PREVENTION      Hx Influenza Vaccination:  No      Influenza Vaccine Declined:  Yes      2 or More Falls Past Year?:  No      Fall Past Year with Injury?:  No      Hx Pneumococcal Vaccination:  No      Encouraged to follow-up with:  PCP regarding preventative exams.      Chart initiated by      ROSALINA JACKSON CMA            IMPRESSION/PLAN      Impression      -Lobular breast cancer      -[id number].  Stage IIB. Right, upper, outer quadrant with 2 axillary nodes.        -CT chest, abdomen, and pelvis and bone scan was done.  No metastatic disease.      As an incidental finding of a 1.8 x 2 cm mass in the lower right renal pole.   It    is also a 0.35 cm noncalcified nodule.  This is too small to characterize.      Radiologist recommended a CT follow-up in 12 months      -Post lumpectomy and sentinel lymph node biopsy showed 3 cm residual cancer and     2 positive lymph nodes.       -Patient will follow-up with Dr. Aguiar for radiation treatment.  Ki-67 is 2%.     ER 60% and MN 80%.      -Based on low Ki-67 and strong with positive ER MN cancer, patient will be     treated with adjuvant endocrine treatment after radiation treatment.       Radiation consult for possible XRT to the right breast. This is 3 request for     consult secondary to transportation issues.  to assist with     transportation.       Palpated a mass in the right upper outer breast around the 10-12 position.     Ultrasound and diagnostic mammogram ordered stat.             -Oncocytoma      -There is a 1.8 x 2 cm renal mass noted on CT abdomen pelvis on March 7-2018.      -Orders placed to follow-up with Dr. Mcintosh for further evaluation.        -negative margins. Right renal mass was benign.       -Follow up with Dr. Mcintosh on September 13.             -Cancer associated pain      -Continue current pain regimen. Refilled pain medication.      -Counseled patient on over the counter stool softners.       -Patient voiced understanding and agreed to take over the counter Miralax and     stool softners to prevent constipation..       -Resolved.             -Cancer associated muscle weakness      -Patient's weakness has remained stable.      -Continue close observation.      -Continues to have increased fatigue and weakness.       -Will check anemia labs today.             -Cancer associated fatigue      -Patient's fatigue has remained stable.      -Recommended increased physical activity and improved nutrition.      -Ongoing: Check anemia labs today.             Current Plan: September 13, 2018.       -Right breast diagnostic mammogram and US to eval lump in the  upper outer right     breast ASAP      -Radiation consult with Dr. Aguiar: 3rd request. Has not been able to go     secondary to transportation.  to assist with transportation.       -Labs today: CBC, CMP, Vit. D, folate/b-12, iron panel, ferritin.       -Home meds refilled until able to obtain new PCP.      -Return in 1 month to see Dr. Moody.            Diagnosis      Notes      New Medications      * Insulin Asp/Prt/Insulin Aspart (NovoLOG Mix 70/30 FLEXPEN*) 100 UNIT/1 ML       INSULN.PEN: 25 UNITS SUBQ BID INSULIN 30 Days #1         Instructions: Dispense 1 box of 100 pen needles also.      Renewed Medications      * Metformin HCl (Glucophage) 1,000 MG TABLET: 1 TAB PO BID 30 Days #60      * Levothyroxine 0.25 MG TABLET: 50 MCG PO QDAY@07 #30      * Lisinopril* 5 MG TABLET: 5 MG PO QDAY #30      * Citalopram HBr (CeleXA) 40 MG TABLET: 40 MG PO QDAY #30      * glipiZIDE (glipiZIDE*) 5 MG TABLET: 15 MG PO BID #180      Discontinued Medications      * Miconazole Nitrate (Miconazole 7 Vag Cream) 45 GM CREAM.APPL: 1 APL VAG HS #1      * MUPIROCIN (Bactroban 2% Oint) 22 GM OINT...G.: 1 APL TOPICAL BID apply to       affected areas BID #1      * Phenazopyridine HCl (Pyridium*) 100 MG TABLET: 100 MG PO Q6H PRN BURNING #30      New Diagnostics      * Diagnostic Mamm/US if Needed, As Soon As Possible         Dx: Breast cancer - C50.919      * CMP Comp Metabolic Panel, Routine         Dx: Breast cancer - C50.919      * Iron Profile, Routine         Dx: Breast cancer - C50.919      * B12      Dx: Breast cancer - C50.919      * Ferritin Level, Routine         Dx: Breast cancer - C50.919      * LDH, Routine         Dx: Breast cancer - C50.919      * CBC With Auto Diff, Routine         Dx: Breast cancer - C50.919      * Path Review Peripheral Smear, Routine         Dx: Breast cancer - C50.919      * Vitamin D Level, Routine         Dx: Breast cancer - C50.919            Patient Education      Patient Education  Provided:  Yes                 Disclaimer: Converted document may not contain table formatting or lab diagrams. Please see [facility] for the authenticated document.
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
## 2021-05-28 NOTE — PROGRESS NOTES
Patient: KONG FORBES     Acct: [id number]     Report: #[id number]  UNIT #: [id number]     : 1955    Encounter Date:2018  PRIMARY CARE: VILMA ZIEGLER DO  ***Signed***  --------------------------------------------------------------------------------------------------------------------  Visit Type      Established Patient Visit            Chief Complaint      BREAST CA            Referring Provider/Copies To      Provider Not Found in Lookup:  ANTOINETTE ARCE            Allergies      Coded Allergies:             ERYTHROMYCIN BASE (Verified  Adverse Reaction, Unknown, Severe Headache, 18)            Medications      Last Reconciled on 18 12:59 by ANNE NARVAEZ MD      Ondansetron (Ondansetron ODT*) 8 Mg Tab.rapdis      8 MG PO Q8H Y for NAUSEA, #24 TAB.RAPDIS 3 Refills         Prov: MANUEL VEGA         18       Levofloxacin (Levofloxacin) 500 Mg Tablet      500 MG PO QDAY, #10 TAB         Prov: MARVIN GARG         18       Sennosides (Senna) 8.6 Mg Tab      8.6 MG PO QDAY, #30 TAB         Prov: Pita Mcintosh         18       Phenazopyridine HCl (Pyridium*) 100 Mg Tablet      100 MG PO Q6H Y for BURNING, #30 TAB 1 Refill         Prov: Pita Mcintosh         18       oxyCODONE/Acetaminophen 5/325 MG (oxyCODONE/Acetaminophen 5/325 MG) 1 Each     Tablet      1 TAB PO Q4H Y for PAIN, #20 TAB 0 Refills         Prov: Pita Mcintosh         18       Mupirocin (Bactroban 2% Oint) 22 Gm Oint...g.      1 APL TOPICAL BID for apply to affected areas BID, #1 TUBE 1 Refill         Prov: NICK LEDESMA         18       glipiZIDE (glipiZIDE*) 5 Mg Tablet      15 MG PO BID, #180 TAB 4 Refills         Prov: NICK LEDESMA         18       Triamterene/HCTZ (Maxzide 75/50 Mg*) 1 Each Tablet      1 TAB PO QDAY Y for SWELLING for 30 Days, #30 TAB 0 Refills         Prov: ERIC PARIS onc         5/15/18       Meth/Meblue/Sod Phos/Psal/Hyos (Uribel Capsule) 1  Each Capsule      1 EACH PO Y for BLADDER SPASMS, CAP         Reported         5/1/18       Citalopram HBr (CeleXA) 40 Mg Tablet      40 MG PO QDAY, #30 TAB 0 Refills         Prov: Herbert Moody         4/3/18       Lisinopril* (Lisinopril*) 5 Mg Tablet      5 MG PO QDAY, #30 TAB 0 Refills         Prov: Herbert Moody         4/3/18       Levothyroxine (Levothyroxine) 0.25 Mg Tablet      50 MCG PO QDAY@07, #30 TAB 0 Refills         Prov: Herbert Moody         4/3/18       Metformin Hcl* (Glucophage*) 1,000 Mg Tablet      1 TAB PO BID for 30 Days, #60 TAB 0 Refills         Prov: Herbert Moody         4/3/18       Mouthwash (Magic Mouthwash) 1 Each Bottle      15 ML PO Q4H Y for SORENESS, ML         Reported         4/3/18       Acetaminophen* (Tylenol Extra Strength*) 500 Mg Tablet      500 MG PO Q4-6H Y for PAIN OR FEVER, #100 TAB 0 Refills         Reported         4/3/18       Miconazole Nitrate (Miconazole 7 Vag Cream) 45 Gm Cream.appl      1 APL VAG HS, #1 TUBE         Prov: ERIC PARIS onc         3/27/18       Cholecalciferol (Vitamin D3*) 2,000 U Tablet      2000 UNITS PO QDAY, #30 TAB 0 Refills         Reported         3/14/18       Biotin (Biotin) 2,500 Mcg Cap      1000 MCG PO QDAY, #30 CAP         Reported         3/14/18       Hydrocodone/Acetaminophen 5/325 MG (Norco 5/325 Mg) 1 Tab Tab      2 TAB PO Q4-6H Y for pain, #15 TAB 0 Refills         Prov: Melissa Baig         3/13/18       diphenhydrAMINE HCl (Benadryl*) 25 Mg Capsule      25 MG PO Q4-6H Y for ALLERGIES, #100 TAB         Reported         3/12/18       Potassium (Potassium) 99 Mg Tablet      99 MG PO TID, TAB         Reported         3/9/18       Magnesium Amino Acid Chelate (Magnesium*) 100 Mg Tablet      250 MCG PO QDAY, TAB         Reported         3/9/18       Insulin Asp/Prt/Insulin Aspart (NovoLOG Mix 70/30 VIAL*) 100 Units/Ml Vial      25 UNITS SUBQ BID INSULIN, #1 VIAL 0 Refills         Reported         3/30/17       Cetirizine  Hcl (ZyrTEC*) 10 Mg Tablet      10 MG PO QDAY, TAB         Reported         5/6/14      Medications Reviewed:  No Changes made to meds            History and Present Illness      Past Oncology Illness History      This is a very pleasant 62-year-old female with newly diagnosed breast cancer     and a kidney mass.            -February 9-2018.  Mammogram shows a suspicious 1.5 cm spiculated mass at 10:00     in the right breast.  Irregular abnormal right axillary lymph node.      -February .  Ultrasound core needle biopsy returned as invasive lobular     carcinoma.  Hurley histologic score 1.  Size of largest focus 13 mm.      Ancillary studies ER +80%.  NH +70%.  Ki-67 40% HER-2/rhea negative.  Metastatic     lobular carcinoma involves lymph node.      -March 7-2018.  CT chest, abdomen, and pelvis showed a 1.8 cm x 2 sent a meter     heterogeneously enhancing exophytic lower pole of the right kidney.  There is     also a 0.35 cm noncalcified nodule in the left upper lobe.      -March 7-2018.  Bone scan showed no abnormal skeletal uptake.  Multifocal focal     degenerative uptake      -March 8-2018.  There is a 2.6 x 4 x 3.4 cm enhancing mass was spiculated     worsened compatible with no invasive lobular carcinoma.  There are 2 right     axillary lymph nodes are enlarged.      -March -May 2018.  Completed 4 cycles of TC      -July .  Patient had a right sentinel node and right lumpectomy.      Pathology revealed invasive lobular carcinoma.  Negative margins.  Number of     lymph nodes with macro metastases 2.  ER 70%.  NH 80%.  Ki-67 2%.  Tumor size 3     cm.            HPI - Oncology Interim       Patient described chest pain has improved. Feels like sharp pain. Noticed it     since cancer was diagnosed. This is a intermittent pain, worsened with     activities of daily living, and no associated bone or muscle pain.            ECOG Performance Status      1            PAST, FAMILY   Past  Medical History      Past Medical History:  No Diabetes Type 1, Diabetes Type 2, Thyroid Disease, No     COPD, No Emphysema, No Hypertension, No Stroke, No High Cholesterol, No Heart     Attack, No Bleeding Condition, No Low or High RBC Count, No Low or High WBC     Count, No Low or High Platelet Coun, No Hepatitis, No Kidney Disease, No     Depression, No Alzheimer's Disease, No Mental Disease, No Seizures, No Arthritis    , No Osteoporosis, No Osteopenia, No Short of Air, No Sleep apnea, No Liver     Disease, No STD, No Enlarged Prostate, Other (INTERSISTAL CYSTITIS/HIATAL HERNIA    )      Hematology/oncology:  DENIES HX OF: Previous Treatment for CA, Anemia, Bladder     Cancer, Blood cancer, Brain cancer, Breast cancer, Cervical cancer, Coagulopathy    , Colorectal cancer, Endocrine cancer, Eye cancer, GI cancer,  cancer, Kidney     cancer, Leukemia, Leukocytosis, Leukopenia, Liver cancer, Lung cancer, Lymphoma    , Musculoskeletal cancer, Myeloma, Neurologic cancer, Oral cancer, Ovarian     cancer, Skin cancer, Stomach cancer, Thrombocytopenia, Thyroid cancer, Uterine     cancer, Other cancer history, Other hematologic history      Genetic/metabolic:  DENIES HX OF: Cystic fibrosis, Down syndrome, Other genetic     history, Other metabolic history            Past Surgical History      DENIES HX OF: Cataract extraction, Thyroid surgery, Lung biopsy, CABG surgery,     Coronary stent, Valve replacement, Appendectomy, Cholecystectomy, Splenectomy,     Bladder surgery, Nephrectomy, Joint replacement, Frature repair, Skin cancer     removal, Melanoma excision, Spinal surgery, Breast biopsy, Lumpectomy,     Mastectomy, bilateral, Mastectomy, right, Mastectomy, left, Hysterectomy, Peg     Tube Placement, VAD Placement, Biopsy, Other Past Surgical Hx            Family History      REPORTS HX OF: Colorectal cancer (MATERNAL GRANDMOTHER), Leukemia (MOTHER),     DENIES HX OF: Anemia, Blood disorders, Blood Cancer, Breast  cancer, Cervical     cancer, Coagulopathy, Endocrine Cancer, Eye Cancer, GI Cancer,  Cancer,     Kidney Cancer, Leukocytosis, Leukopenia, Liver Cancer, Lung cancer, Lymphoma,     Melanoma, Musculoskeletal Cancer, Myeloma, Neurologic Cancer, Oral Cancer,     Ovarian cancer, Prostate cancer, Skin Cancer, Stomach Cancer, Testicular Cancer    , Thrombocytopenia, Thyroid cancer, Uterine cancer, Other Cancer History, Other     Hematology History            Social History      Lives independently:  Yes            Tobacco Use      Tobacco status:  Never smoker            Alcohol Use      Alcohol intake:  None            Substance Use      Substance use:  Denies use            REVIEW OF SYSTEMS      General:  Denies: Appetite change, Excessive sweating, Fatigue, Fever, Night     sweats, Weight gain, Weight loss, Other      Eyes:  Denies: Blurred vision, Corrective lenses, Diplopia, Eye irritation, Eye     pain, Eye redness, Spots in vision, Vision loss, Other      Ears, nose, mouth, throat:  Denies: Headache, Seizures, Visual Changes, Hearing     loss, Sinus Congestion, Hoarseness, Sore throat, Other      Cardiovascular:  Denies: Chest pain, Irregular heartbeat, Palpitations, Swollen     ankles/legs, Other      Respiratory:  Denies: Chest pain, Shortness of Air, Productive cough, Coughing     blood, Other      Gastrointestinal:  Denies: Nausea, Vomiting, Problem swallowing, Frequent     heartburn, Constipation, Diarrhea, Tarry stools, Bloody stools, Unable to     control bowels, Other      Kidney/Bladder:  Denies: Painful Urination, Change in urinary stream, Blood in     urine, Incontinence, Frequent Urination, Decreased urine stream, Other      Musculoskeletal:  Denies: New Back pain, Leg Cramps, Painful Joints, Swollen     Joints, Muscle Pain, Muscle weakness, Other      Skin:  DENIES: Jaundice, Easy Bleeding, Lesions/changes in moles, Nail changes,     Skin Discoloration, Rash, Other      Neurological:  Denies:  Dizziness, Fainting, Numbness\Tingling, Paralysis,     Seizures, Other      Psychiatric:  Complains of: AAO X 3, Denies: Anxiety, Panic attacks, Depression    , Memory loss, Other      Endocrine:  DiabetesThyroid DisorderOsteoporosisEndocrine Other      Hematologic/lymphatic:  Denies: Bruising, Bleeding, Enlarged Lymph Nodes,     Recurrent infections, Other      Reproductive:  Denies Pregnant, Denies Menopause, Denies Still Menstruating,     Denies Heavy Periods, Denies Other            VITAL SIGNS,PAIN/FATIGUE SCORE      Vitals      Height 5 ft 2.56 in / 158.9 cm      Weight 207 lbs 3.718 oz / 94 kg      BSA 2.08 m2      BMI 37.2 kg/m2      Temperature 98 F / 36.67 C - Temporal      Pulse 107      Respirations 16      Blood Pressure 138/61 Sitting, Left Arm      Pulse Oximetry 100%, RM AIR            Pain Score      Experiencing any pain?:  Yes      Pain Intensity:  8            Fatigue Score      Experiencing any fatigue?:  Yes            PREVENTION      Hx Influenza Vaccination:  No      Influenza Vaccine Declined:  Yes      2 or More Falls Past Year?:  No      Fall Past Year with Injury?:  No      Hx Pneumococcal Vaccination:  No      Encouraged to follow-up with:  PCP regarding preventative exams.      Chart initiated by      SYDNEE CHANDLER MA            IMPRESSION/PLAN      Plan      -Lobular breast cancer      -[id number].  Stage IIB. Right, upper, outer quadrant with 2 axillary nodes.        -CT chest, abdomen, and pelvis and bone scan was done.  No metastatic disease.      As an incidental finding of a 1.8 x 2 cm mass in the lower right renal pole.      It is also a 0.35 cm noncalcified nodule.  This is too small to characterize.      Radiologist recommended a CT follow-up in 12 months      -Post lumpectomy and sentinel lymph node biopsy showed 3 cm residual cancer and     2 positive lymph nodes.       -Patient will follow-up with Dr. Aguiar for radiation treatment.  Ki-67 is 2%.      ER 60% and AR 80%.       -Based on low Ki-67 and strong with positive ER WA cancer, patient will be     treated with adjuvant endocrine treatment after radiation treatment.             -Oncocytoma      -There is a 1.8 x 2 cm renal mass noted on CT abdomen pelvis on March 7-2018.      -Orders placed to follow-up with Dr. Mcintosh for further evaluation.        -negative margins.       -Follow up with Dr. Mcintosh.             -Cancer associated pain      -Continue current pain regimen. Refilled pain medication.      -Counseled patient on over the counter stool softners.       -Patient voiced understanding and agreed to take over the counter Miralax and     stool softners to prevent constipation..             -Cancer associated muscle weakness      -Patient's weakness has remained stable.      -Continue close observation.            -Cancer associated fatigue      -Patient's fatigue has remained stable.      -Recommended increased physical activity and improved nutrition            -Anxiety due to cancer      -Patient's anxiety is well controlled today.       -Continue current management.             -Today's Evaluation      -Patient's imaging exams, blood tests, physicians' notes, and any new findings     since our last visit were reviewed today to reassess patient's medical     treatment plan. .       -Old medical records were reviewed and summarized in chronological order in the     HPI today to maintain an updated medical record.       -Patient's radiology imaging tests from our last visit were reviewed     independently by me by direct visualization of the images.        -Patients current lab tests and medications were carefully reviewed to evaluate     patient's current treatment plan today.       -Patient was advised to call us right away if there are any new symptoms for an     urgent visit for further evaluation because this may be due to cancer     recurrence. Patient voiced understanding and agreed to do so.      -Plan today.   Follow-up radiation oncologist            Diagnosis      Breast cancer - C50.919            Oncocytoma - D36.9            Patient Education      Patient Education Provided:  Yes      Over 50% Time Counseling Pt:  Yes                 Disclaimer: Converted document may not contain table formatting or lab diagrams. Please see [employer] for the authenticated document.

## 2021-05-28 NOTE — PROGRESS NOTES
Patient: KONG FORBES     Acct: [id number]     Report: #[id number]  UNIT #: [id number]     : 1955    Encounter Date:2018  PRIMARY CARE: VILMA ZIEGLER DO  ***Signed***  --------------------------------------------------------------------------------------------------------------------  Chief Complaint      Mar 9, 2018      CT, BONE SCAN, AND MRI FOLLOW-UP OF BREAST      Breast Cancer      Intent of Therapy?:  Curative            Current Plan      -Lobular breast cancer      -T2 N1 M0.  Stage IIB      -CT chest, abdomen, and pelvis and bone scan was done.  No metastatic disease.      As an incidental finding of a 1.8 x 2 cm mass in the lower right renal pole.      It is also a 0.35 cm noncalcified nodule.  This is too small to characterize.      Radiologist recommended a CT follow-up in 12 months      -Patient was given a copy of the national cancer center network guidelines.  If     patient remains a locally advanced stage we discussed treatment options     including neoadjuvant versus adjuvant chemotherapy.  After discussion of risk     and benefits we have agreed for neoadjuvant chemotherapy.      -Patient will have a port placement with Dr. Melissa Baig.      -Orders will be placed for chemotherapy            -Renal mass      -There is a 1.8 x 2 cm renal mass noted on CT abdomen pelvis on .      -Orders placed to follow-up with Dr. Mcintosh for further evaluation.  Appreciate     recommendations            Medical Evaluation of Cancer Associated Symptoms today      -Cancer associated pain      -Patient's pain has remained stable.      -Continue current pain regimen.      -Cancer associated muscle weakness      -Patient's weakness has remained stable.      -Continue close observation.      -Cancer associated fatigue      -Patient's fatigue has remained stable.      -Recommended exercise.      -Anxiety due to cancer      -Patient's anxiety is well controlled today.       -Continue  current management.       -Today's Assessment      -ECOG 1.       -Patient's imaging exams, blood tests, physicians' notes, and any new findings     since our last visit were reviewed today to reassess patient's medical     treatment plan. .       -Old medical records were reviewed and summarized in chronological order in the     HPI today to maintain an updated medical record.       -Patient's radiology imaging tests from our last visit were reviewed     independently by me by direct visualization of the images.        -Patients current lab tests and medications were carefully reviewed to evaluate     patient's current treatment plan today.       -Patient was advised to call us right away if there are any new symptoms for an     urgent visit for further evaluation. Patient voiced understanding and agreed to     do so.      Breast cancer - C50.919            Renal mass - N28.89            Notes      New Medications      * BIOTIN (Biotin) 2,500 MCG CAP: 1,000 MCG PO QDAY #30      * Magnesium Amino Acid Chelate (Magnesium*) 100 MG TABLET: 250 MCG PO QDAY      * Potassium 99 MG TABLET: 99 MG PO TID      * Cholecalciferol (Vitamin D3*) 1,000 UNIT TAB: 2,000 UNITS PO QID #60      Changed Medications      * Levothyroxine 0.25 MG TABLET: 50 MCG PO QDAY@07 #30      New Referrals      * Urology, As Soon As Possible       Pita Mcintosh       REFERRED TO DR. PITA MCINTOSH        Dx: Breast cancer - C50.919      Time Spent:  > 50% /Coord Care      Patient Education Provided:  Yes            History and Present Illness      This is a very pleasant 62-year-old female with newly diagnosed breast cancer     and a kidney mass.            -February 9-2018.  Mammogram shows a suspicious 1.57 m spiculated mass at 10:00     in the right breast.  Irregular abnormal right axillary lymph node.      -February .  Ultrasound core needle biopsy returned as invasive lobular     carcinoma.  [place] histologic score 1.  Size of  largest focus 13 mm.      Ancillary studies ER +80%.  FL +70%.  Ki-67 40% HER-2/rhea negative.  Metastatic     lobular carcinoma involves lymph node.      -March 7-2018.  CT chest, abdomen, and pelvis showed a 1.8 cm x 2 sent a meter     heterogeneously enhancing exophytic lower pole of the right kidney.  There is     also a 0.35 cm noncalcified nodule in the left upper lobe.      -March 7-2018.  Bone scan showed no abnormal skeletal uptake.  Multifocal focal     degenerative uptake      -March 8-2018.  There is a 2.6 x 4 x 3.4 cm enhancing mass was spiculated     worsened compatible with no invasive lobular carcinoma.  There are 2 right     axillary lymph nodes are enlarged.            Vitals      Height 62.56 in / 158.9 cm      Weight 215 lbs 9.758 oz / 97.8 kg      BSA 2.13 m2      BMI 38.7 kg/m2      Temperature 97.7 F / 36.5 C - Temporal      Pulse 89      Pulse Oximetry 100%, ROOM AIR            Allergies      Coded Allergies:             ERYTHROMYCIN BASE (Verified  Adverse Reaction, Unknown, Severe Headache, 3/    9/18)           SULFA (SULFONAMIDE ANTIBIOTICS) (Verified  Adverse Reaction, Unknown, 3/9/    18)       it does not work on her            Medications      Last Reconciled on 3/9/18 19:06 by ANNE NARVAEZ MD      Cholecalciferol (Vitamin D3*) 1,000 Unit Tab      2000 UNITS PO QID, #60 TAB 0 Refills         Reported         3/9/18       Potassium (Potassium) 99 Mg Tablet      99 MG PO TID, TAB         Reported         3/9/18       Magnesium Amino Acid Chelate (Magnesium*) 100 Mg Tablet      250 MCG PO QDAY, TAB         Reported         3/9/18       Biotin (Biotin) 2,500 Mcg Cap      1000 MCG PO QDAY, #30 CAP         Reported         3/9/18       Dexamethasone (Decadron) 4 Mg Tablet      8 MG PO BID, TAB 3 Refills         Reported         2/27/18       Ondansetron Hcl (Zofran ODT*) 8 Mg Tab.rapdis      8 MG PO Q8H for NAUSEA, #30 TAB.RAPDIS 3 Refills         Reported         2/27/18        Prochlorperazine Maleate (Prochlorperazine Maleate) 10 Mg Tab      10 MG PO Q6H Y for NAUSEA AND/OR VOMITING, #60 TAB 3 Refills         Reported         2/27/18       Saccharomyces Boulardii (Florastor) 250 Mg Capsule      250 MG PO BID, #14 CAP         Prov: [place]         4/1/17       Cephalexin Monohydrate (Cephalexin*) 500 Mg Capsule      500 MG PO TID, #15 CAP 0 Refills         Prov: Alok Mahoney         4/1/17       Doxycycline Hyclate (Doxycycline Hyclate*) 100 Mg Capsule      100 MG PO BID, #10 CAP 0 Refills         Prov: Alok Mahoney         4/1/17       Insulin Asp/Prt/Insulin Aspart (NovoLOG Mix 70/30 VIAL*) 100 Units/Ml Vial      20 UNITS SUBQ BID INSULIN, #1 VIAL 0 Refills         Reported         3/30/17       glipiZIDE (glipiZIDE*) 5 Mg Tablet      15 MG PO BID, #180 TAB 0 Refills         Reported         3/30/17       Levothyroxine (Levothyroxine) 0.25 Mg Tablet      50 MCG PO QDAY@07, #30 TAB 0 Refills         Reported         3/30/17       diphenhydrAMINE HCl (diphenhydrAMINE HCl) 25 Mg Capsule      25 MG PO Q4HR, TAB         Reported         3/30/17       glipiZIDE (glipiZIDE*) 5 Mg Tablet      15 MG PO QDAY, #90 TAB 0 Refills         Reported         3/30/17       Cetirizine Hcl (ZyrTEC*) 10 Mg Tablet      1 TAB PO QDAY, TAB         Reported         5/6/14       metFORMIN HCl (Glucophage*) 1,000 Mg Tablet      1 TAB PO BID, TAB 0 Refills         Reported         8/21/09            General Information      Provider Not Found in Lookup:  MELISSA ARCE      [facility] Provider 2:  Melissa Baig      [facility] Provider 3:  Pita Mcintosh            Pain and Fatigue Scales      Pain Assessment:           Experiencing any pain?:  No      Fatigue:           Experiencing any fatigue?:  Yes         Fatigue (0-10 scale):  5            Past Medical History      No Diabetes Type 1, Yes Diabetes Type 2, No Thyroid Disease, No COPD, No     Emphysema, No Hypertension, No Stroke, No High Cholesterol, No Heart Attack,  No     Bleeding Condition, No Low or High RBC Count, No Low or High WBC Count, No Low     or High Platelet Coun, No Hepatitis, No Kidney Disease, No Depression, No     Alzheimer's Disease, No Mental Disease, No Seizures, No Arthritis, No     Osteoporosis, No Osteopenia, No Short of Air, No Sleep apnea, No Liver Disease,     No STD, No Enlarged Prostate, Yes Other (INTERSISTAL CYSTITIS/HIATAL HERNIA)      Hematology/oncology:  DENIES HX OF: Previous Treatment for CA, Anemia, Bladder     Cancer, Blood cancer, Brain cancer, Breast cancer, Cervical cancer, Coagulopathy    , Colorectal cancer, Endocrine cancer, Eye cancer, GI cancer,  cancer, Kidney     cancer, Leukemia, Leukocytosis, Leukopenia, Liver cancer, Lung cancer, Lymphoma    , Musculoskeletal cancer, Myeloma, Neurologic cancer, Oral cancer, Ovarian     cancer, Skin cancer, Stomach cancer, Thrombocytopenia, Thyroid cancer, Uterine     cancer, Other cancer history, Other hematologic history      Genetic/metabolic:  DENIES HX OF: Cystic fibrosis, Down syndrome, Other genetic     history, Other metabolic history            Past Surgical History      DENIES HX OF: Cataract extraction, Thyroid surgery, Lung biopsy, CABG surgery,     Coronary stent, Valve replacement, Appendectomy, Cholecystectomy, Splenectomy,     Bladder surgery, Nephrectomy, Joint replacement, Frature repair, Skin cancer     removal, Melanoma excision, Spinal surgery, Breast biopsy, Lumpectomy,     Mastectomy, bilateral, Mastectomy, right, Mastectomy, left, Hysterectomy, Peg     Tube Placement, VAD Placement, Biopsy, Other Past Surgical Hx            Family History      REPORTS HX OF: Colorectal cancer (MATERNAL GRANDMOTHER), Leukemia (MOTHER),     DENIES HX OF: Anemia, Blood disorders, Blood Cancer, Breast cancer, Cervical     cancer, Coagulopathy, Endocrine Cancer, Eye Cancer, GI Cancer,  Cancer,     Kidney Cancer, Leukocytosis, Leukopenia, Liver Cancer, Lung cancer, Lymphoma,     Melanoma,  Musculoskeletal Cancer, Myeloma, Neurologic Cancer, Oral Cancer,     Ovarian cancer, Prostate cancer, Skin Cancer, Stomach Cancer, Testicular Cancer    , Thrombocytopenia, Thyroid cancer, Uterine cancer, Other Cancer History, Other     Hematology History            Social History      Yes            Tobacco Use      Tobacco status:  Never smoker            Alcohol Use      Alcohol intake:  None            Substance Use      Substance use:  Denies use            ROS      General:  Complains of: Fatigue, Denies: Appetite change, Excessive sweating,     Fever, Night sweats, Weight gain, Weight loss, Other      Eyes:  Denies: Blurred vision, Corrective lenses, Diplopia, Eye irritation, Eye     pain, Eye redness, Spots in vision, Vision loss, Other      Ears, nose, mouth, throat:  Complains of: Sinus Congestion (DRAINAGE), Other (    RIGHT EAR PAIN), Denies: Headache, Seizures, Visual Changes, Hearing loss,     Hoarseness, Sore throat      Cardiovascular:  Denies: Chest pain, Irregular heartbeat, Palpitations, Swollen     ankles/legs, Other      Respiratory:  Denies: Chest pain, Shortness of Air, Productive cough, Coughing     blood, Other      Gastrointestinal:  Denies: Nausea, Vomiting, Problem swallowing, Frequent     heartburn, Constipation, Diarrhea, Tarry stools, Bloody stools, Unable to     control bowels, Other      Kidney/Bladder:  Denies: Painful Urination, Change in urinary stream, Blood in     urine, Incontinence, Frequent Urination, Decreased urine stream, Other      Musculoskeletal:  Denies: New Back pain, Leg Cramps, Painful Joints, Swollen     Joints, Muscle Pain, Muscle weakness, Other      Skin:  DENIES: Jaundice, Easy Bleeding, Lesions/changes in moles, Nail changes,     Skin Discoloration, Rash, Other      Neurological:  Denies: Dizziness, Fainting, Numbness\Tingling, Paralysis,     Seizures, Other      Psychiatric:  Complains of: AAO X 3, Denies: Anxiety, Panic attacks, Depression    , Memory loss,  Other      Hematologic/lymphatic:  Denies: Bruising, Bleeding, Enlarged Lymph Nodes,     Recurrent infections, Other      Reproductive:  Denies Pregnant, Denies Menopause, Denies Still Menstruating,     Denies Heavy Periods, Denies Other            Vitals            Vital Signs              Date Time  Temp Pulse Resp B/P (MAP) Pulse Ox O2 Delivery O2 Flow Rate FiO2             2/22/18 13:02 97.5 106 20 136/70 100 RM AIR              General Appearance:  Alert, Oriented X3, No acute distress      Eyes:  Anicteric Sclerae, Moist Conjunctiva      HEENT:  Orophraynx clear, No Erythema      Neck:  Supple, Full ROM      Respiratory:  CTAB, No Diminished Breath      Abdomen\Gastro:  Soft, No Masses      Cardio:  RRR, No Murmur, No, Peripheral Edema      Skin:  Normal Temperature, Normal Tone, No Rash, lesions, ulcers      Psychiatric:  Appropriate Affect, AAO x3      Neuro:  Cranial Nerve II-XII Inta, No Focal Sensory Deficit      Muscularskeletal:  Normal Gait and Station, Full ROM of extremeties      Extremities:  No Digital Cyanosis, No Digital Ischemia      Lymphatic:  No Cervical, No Supraclavicular, No Axillary            Hx Influenza Vaccination:  No      Influenza Vaccine Declined:  Yes      2 or More Falls Past Year?:  No      Fall Past Year with Injury?:  No      Hx Pneumococcal Vaccination:  No      Encouraged to follow-up with:  PCP regarding preventative exams.      Chart initiated by      CLOVER KEN MA                 Disclaimer: Converted document may not contain table formatting or lab diagrams. Please see [email or web address] [facility] for the authenticated document.

## 2021-05-28 NOTE — PROGRESS NOTES
Patient: KONG FORBES     Acct: [id number]     Report: #[id number]  UNIT #: [id number]     : 1955    Encounter Date:04/10/2018  PRIMARY CARE: VILMA ZIEGLER DO  ***Signed***  --------------------------------------------------------------------------------------------------------------------  Chief Complaint      Apr 10, 2018      Breast Cancer      Intent of Therapy?:  Curative            Current Plan      -Lobular breast cancer      -T2 N1 M0.  Stage IIB. Right, upper, outer quadrant with 2 axillary nodes.        -CT chest, abdomen, and pelvis and bone scan was done.  No metastatic disease.      As an incidental finding of a 1.8 x 2 cm mass in the lower right renal pole.      It is also a 0.35 cm noncalcified nodule.  This is too small to characterize.      Radiologist recommended a CT follow-up in 12 months      -Patient was given a copy of the national cancer center network guidelines.  If     patient remains a locally advanced stage we discussed treatment options     including neoadjuvant versus adjuvant chemotherapy.  After discussion of risk     and benefits we have agreed for neoadjuvant chemotherapy.      -Patient will proceed with neoadjuvant chemotherapy.  Afterwards patient will     have surgery with Dr. Melissa Baig.            -Renal mass      -There is a 1.8 x 2 cm renal mass noted on CT abdomen pelvis on .      -Orders placed to follow-up with Dr. Mcintosh for further evaluation.  Appreciate     recommendations      -Patient will have surgery after chemotherapy.            -Hyperglycemia      -Glucose 189 prior to chemotherapy      -Significant history of insulin-dependent diabetes      -Patient reports blood sugars will spike to 300-400 with pre-treatment steroids     and chemotherapy treatment.        -Patient encouraged to discuss insulin coverage during treatment to manage     elevated blood sugar.      -Encouraged to exercise to promote lower blood sugar; as well as  toussaint ongoing     fatigue.            -Interval Clinic Visit Changes      -Patient received cycle 2  of Cytoxan and Docetaxel      -April 10, 2018-C2D1 Docetaxel   -Patient will follow up with PCP regarding elevated blood sugars.       -Return in 3 weeks for next cycle.             Medical Evaluation of Chemotherapy Associated Toxicities Today      -Chemotherapy associated fatigue      -Patient's weakness has remained stable.      -Recommended Exercise.       -Continue close observation.      -Chemotherapy associated nausea      -Patient's nausea has remained stable.      -Currently on Anti-Nausea medications.       -Continue close observation.      -Chemotherapy associated anemia      -Does not require blood transfusion on today's visit.       -Transfuse blood when hemoglobin is less than 7.      -Continue close observation.      -Chemotherapy associated thrombocytopenia      -Does not require platelet transfusion on today's visit.       -Transfuse platelet when platelet count < 20,000.       -Continue close observation.      -Anxiety due to cancer      -Patient's anxiety is well controlled today.       -Continue current management.       -Today's Assessment      -ECOG 1.       -Patient's imaging exams, blood tests, physicians' notes, and any new findings     since our last visit were reviewed today to reassess patient's medical     treatment plan. .       -Old medical records were reviewed and summarized in chronological order in the     HPI today to maintain an updated medical record.       -Patient's radiology imaging tests from our last visit were reviewed     independently by me by direct visualization of the images.        -Patients current lab tests and medications were carefully reviewed to evaluate     patient's current treatment plan today. Proceed with chemotherapy plan.       -Patient was advised to call us right away if there are any new symptoms for an     urgent visit for further evaluation. Patient  voiced understanding and agreed to     do so.      Breast cancer       Malignant neoplasm of upper-outer quadrant of right female breast, unspecified     estrogen receptor status - C50.411       Breast location: upper outer quadrant of breast       Estrogen receptor status: unspecified       Patient sex: female       Laterality: right            Type II diabetes mellitus       Type 2 diabetes mellitus with complication, with long-term current use of     insulin - E11.8, Z79.4       Diabetes mellitus long term insulin use: with long term use       Diabetes mellitus complication status: with unspecified complications            Fatigue       Fatigue, unspecified type - R53.83       Fatigue type: unspecified      Intensive Monitor for Toxicity:  Labs Reviewed, Chemo Orders Reviewd, Meds\    Narcotics Reviewed      Labs Reviewed During Visit?:  Yes      Time Spent:  > 50% /Coord Care      Patient Education Provided:  Yes      Additional information      Patient was seen and examined by APRN. Patient was seen in clinic, physical     exam was performed, lab and imaging tests were noted, and above medical     documentation was also done by me. Patient information and plan of care was     reviewed with Dr. Moody.approx.  Thank you very much for the opportunity to participate     in your patient's care.approx.  Warm Regards.      ECOG Score:  0            Date      April 10, 2018      Presents for C2 Taxotere   significant side effects.  Indicates her blood sugar increases with premed     steroids and treatment.  Encouraged patient to discuss hyperglycemia with PCP     to provide coverage during signficant elevation of blood glucose. Denies nausea    , vomiting, fever, and numbness/tingling.      ECOG      ECOG Performance Status:  0            History and Present Illness      This is a very pleasant 62-year-old female with newly diagnosed breast cancer     and a kidney mass.            -February 9-2018.  Mammogram  shows a suspicious 1.57 m spiculated mass at 10:00     in the right breast.  Irregular abnormal right axillary lymph node.      -February .  Ultrasound core needle biopsy returned as invasive lobular     carcinoma.  Yosvany histologic score 1.  Size of largest focus 13 mm.      Ancillary studies ER +80%.  [place] +70%.  Ki-67 40% HER-2/rhea negative.  Metastatic     lobular carcinoma involves lymph node.      -March 7-2018.  CT chest, abdomen, and pelvis showed a 1.8 cm x 2 sent a meter     heterogeneously enhancing exophytic lower pole of the right kidney.  There is     also a 0.35 cm noncalcified nodule in the left upper lobe.      -March 7-2018.  Bone scan showed no abnormal skeletal uptake.  Multifocal focal     degenerative uptake      -March 8-2018.  There is a 2.6 x 4 x 3.4 cm enhancing mass was spiculated     worsened compatible with no invasive lobular carcinoma.  There are 2 right     axillary lymph nodes are enlarged.      -March .  Cycle 1 day 1 Docetaxel Cytoxan      -April 10, 2018-Cycle 2 day 1 Docetaxel         Vitals      Height 5 ft 2.56 in / 158.9 cm      Weight 216 lbs 14.922 oz / 98.4 kg      BSA 2.13 m2      BMI 39.0 kg/m2      Temperature 97.3 F / 36.28 C - Temporal      Pulse 97      Blood Pressure 116/64 Sitting      Pulse Oximetry 100%, ROOM AIR            Allergies      Coded Allergies:             ERYTHROMYCIN BASE (Verified  Adverse Reaction, Unknown, Severe Headache, 4/    3/18)           SULFA (SULFONAMIDE ANTIBIOTICS) (Verified  Adverse Reaction, Unknown, 4/3/    18)       it does not work on her            Medications      Last Reconciled on 4/12/18 21:40 by ANNE MOODY MD      Citalopram HBr (CeleXA) 40 Mg Tablet      40 MG PO QDAY, #30 TAB 0 Refills         Prov: Anne Moody         4/3/18       Lisinopril* (Lisinopril*) 5 Mg Tablet      5 MG PO QDAY, #30 TAB 0 Refills         Prov: Anne Moody         4/3/18       Levothyroxine (Levothyroxine) 0.25 Mg Tablet       50 MCG PO QDAY@07, #30 TAB 0 Refills         Prov: Herbert Moody         4/3/18       glipiZIDE (glipiZIDE*) 5 Mg Tablet      15 MG PO BID, #90 TAB 0 Refills         Prov: Herbert Moody         4/3/18       metFORMIN HCl (Glucophage*) 1,000 Mg Tablet      1 TAB PO BID for 30 Days, #60 TAB 0 Refills         Prov: Herbert Moody         4/3/18       Mouthwash (Magic Mouthwash) 1 Each Bottle      15 ML PO Q4H Y for SORENESS, ML         Reported         4/3/18       Acetaminophen* (Tylenol Extra Strength*) 500 Mg Tablet      500 MG PO Q4-6H Y for PAIN OR FEVER, #100 TAB 0 Refills         Reported         4/3/18       Miconazole Nitrate (Miconazole 7 Vag Cream) 45 Gm Cream.appl      1 APL VAG HS, #1 TUBE         Prov: ERIC PARIS onc         3/27/18       Cholecalciferol (Vitamin D3*) 2,000 U Tablet      2000 UNITS PO QDAY, #30 TAB 0 Refills         Reported         3/14/18       Biotin (Biotin) 2,500 Mcg Cap      1000 MCG PO QDAY, #30 CAP         Reported         3/14/18       Prochlorperazine (Compazine*) 10 Mg/2 Ml Vial      10 MG IM Q6H Y for NAUSEA AND/OR VOMITING, VIAL         Reported         3/14/18       Dexamethasone (Dexamethasone) 2 Mg Tab      4 MG PO BID, TAB         Reported         3/14/18       Hydrocodone/Acetaminophen 5/325 MG (Norco 5/325 Mg) 1 Tab Tab      2 TAB PO Q4-6H Y for pain, #15 TAB 0 Refills         Prov: Melissa Baig         3/13/18       Ondansetron (Ondansetron ODT*) 8 Mg Tab.rapdis      8 MG PO Q8H Y for NAUSEA, #24 TAB.RAPDIS 3 Refills         Prov: Herbert Moody         3/13/18       diphenhydrAMINE HCl (Benadryl*) 25 Mg Capsule      25 MG PO Q4-6H Y for ALLERGIES, #100 TAB         Reported         3/12/18       Potassium (Potassium) 99 Mg Tablet      3 PO TID, TAB         Reported         3/9/18       Magnesium Amino Acid Chelate (Magnesium*) 100 Mg Tablet      250 MCG PO QDAY, TAB         Reported         3/9/18       Insulin Asp/Prt/Insulin Aspart (NovoLOG Mix 70/30 VIAL*)  100 Units/Ml Vial      20 UNITS SUBQ BID INSULIN, #1 VIAL 0 Refills         Reported         3/30/17       Cetirizine Hcl (ZyrTEC*) 10 Mg Tablet      1 TAB PO QDAY, TAB         Reported         5/6/14            General Information      Provider Not Found in Lookup:  MELISSA ARCE      [facility] Provider 2:  Melissa Baig      [facility] Provider 3:  Pita Mcintosh            Pain and Fatigue Scales      Pain Assessment:           Experiencing any pain?:  No      Fatigue:           Experiencing any fatigue?:  Yes         Fatigue (0-10 scale):  6            Chemo Status      On Oral Chemotherapy?:  No            PAST, FAMILY   Past Medical History      No Diabetes Type 1, Yes Diabetes Type 2, Yes Thyroid Disease, No COPD, No     Emphysema, No Hypertension, No Stroke, No High Cholesterol, No Heart Attack, No     Bleeding Condition, No Low or High RBC Count, No Low or High WBC Count, No Low     or High Platelet Coun, No Hepatitis, No Kidney Disease, No Depression, No     Alzheimer's Disease, No Mental Disease, No Seizures, No Arthritis, No     Osteoporosis, No Osteopenia, No Short of Air, No Sleep apnea, No Liver Disease,     No STD, No Enlarged Prostate, Yes Other (INTERSISTAL CYSTITIS/HIATAL HERNIA)      Hematology/oncology:  DENIES HX OF: Previous Treatment for CA, Anemia, Bladder     Cancer, Blood cancer, Brain cancer, Breast cancer, Cervical cancer, Coagulopathy    , Colorectal cancer, Endocrine cancer, Eye cancer, GI cancer,  cancer, Kidney     cancer, Leukemia, Leukocytosis, Leukopenia, Liver cancer, Lung cancer, Lymphoma    , Musculoskeletal cancer, Myeloma, Neurologic cancer, Oral cancer, Ovarian     cancer, Skin cancer, Stomach cancer, Thrombocytopenia, Thyroid cancer, Uterine     cancer, Other cancer history, Other hematologic history      Genetic/metabolic:  DENIES HX OF: Cystic fibrosis, Down syndrome, Other genetic     history, Other metabolic history            Past Surgical History      DENIES HX OF: Cataract  extraction, Thyroid surgery, Lung biopsy, CABG surgery,     Coronary stent, Valve replacement, Appendectomy, Cholecystectomy, Splenectomy,     Bladder surgery, Nephrectomy, Joint replacement, Frature repair, Skin cancer     removal, Melanoma excision, Spinal surgery, Breast biopsy, Lumpectomy,     Mastectomy, bilateral, Mastectomy, right, Mastectomy, left, Hysterectomy, Peg     Tube Placement, VAD Placement, Biopsy, Other Past Surgical Hx            Family History      REPORTS HX OF: Colorectal cancer (MATERNAL GRANDMOTHER), Leukemia (MOTHER),     DENIES HX OF: Anemia, Blood disorders, Blood Cancer, Breast cancer, Cervical     cancer, Coagulopathy, Endocrine Cancer, Eye Cancer, GI Cancer,  Cancer,     Kidney Cancer, Leukocytosis, Leukopenia, Liver Cancer, Lung cancer, Lymphoma,     Melanoma, Musculoskeletal Cancer, Myeloma, Neurologic Cancer, Oral Cancer,     Ovarian cancer, Prostate cancer, Skin Cancer, Stomach Cancer, Testicular Cancer    , Thrombocytopenia, Thyroid cancer, Uterine cancer, Other Cancer History, Other     Hematology History            Social History      Yes            Tobacco Use      Tobacco status:  Never smoker            Alcohol Use      Alcohol intake:  None            Substance Use      Substance use:  Denies use            REVIEW OF SYSTEMS      General:  Complains of: Fatigue, Denies: Appetite change, Excessive sweating,     Fever, Night sweats      Eyes:  Complains of: Corrective lenses, Denies: Blurred vision, Spots in vision    , Vision loss      Ears, nose, mouth, throat:  Denies: Headache, Seizures, Visual Changes      Cardiovascular:  Denies: Chest pain, Irregular heartbeat, Palpitations, Swollen     ankles/legs      Respiratory:  Denies: Chest pain, Shortness of Air, Productive cough, Coughing     blood      Gastrointestinal:  Denies: Nausea, Vomiting, Constipation, Diarrhea      Kidney/Bladder:  Complains of: Frequent Urination, Denies: Painful Urination,     Incontinence       Musculoskeletal:  Denies: New Back pain, Leg Cramps, Painful Joints, Swollen     Joints, Muscle Pain      Skin:  DENIES: Jaundice, Easy Bleeding, Lesions/changes in moles, Skin     Discoloration, Rash      Neurological:  Denies: Dizziness, Fainting, Numbness\Tingling, Paralysis,     Seizures      Psychiatric:  Complains of: AAO X 3, Denies: Anxiety, Panic attacks, Depression    , Memory loss      Endocrine:  Denies DiabetesDenies Thyroid Disorder      Hematologic/lymphatic:  Denies: Bruising, Bleeding, Enlarged Lymph Nodes      Reproductive:  Complains of Menopause, Denies Pregnant            Vitals            Vital Signs              Date Time  Temp Pulse Resp B/P (MAP) Pulse Ox O2 Delivery O2 Flow Rate FiO2             4/3/18 14:18 97.1 103  122/69 99 room air               3/20/18 09:15   20                 General Appearance:  Alert, Oriented X3, Cooperative, No acute distress      Eyes:  Anicteric Sclerae, Moist Conjunctiva      HEENT:  Orophraynx clear, No Erythema, No Pallor, No Thrush      Neck:  Supple, Full ROM, No Masses or JVD      Respiratory:  CTAB, No Diminished Breath, No Rales, No Crackels, No Rhonchi      Breast\Chest:  Symmetrical      Abdomen\Gastro:  Soft, No Masses, No Hepatosplenomegaly      Cardio:  RRR, No Murmur, No, Peripheral Edema      Skin:  Normal Temperature, Normal Tone, Normal Texture and Turgor, No Rash,     lesions, ulcers      Psychiatric:  Appropriate Affect, Intact Judgement, AAO x3      Neuro:  Cranial Nerve II-XII Inta, No Focal Sensory Deficit      Genitourinary:  No Mazariegos Catheter      Muscularskeletal:  Normal Gait and Station, Full ROM of extremeties, Full     muscle strength\tone      Extremities:  No Digital Cyanosis, No Digital Ischemia      Lymphatic:  No Cervical, No Supraclavicular, No Infraclavicular, No Axillary,     No Inguinal            Lab Results      Laboratory Tests      4/3/18 16:19            PREVENTION      Hx Influenza Vaccination:  No      Influenza  Vaccine Declined:  Yes      Hx Pneumococcal Vaccination:  No      Encouraged to follow-up with:  PCP regarding preventative exams.      Chart initiated by      Patient was seen in clinic, physical exam was performed, lab and imaging tests     were noted, and above medical documentation was also done by me.  Thank you     very much for the opportunity to participate in your patient's care.  Warm     Regards.             Anne Moody MD FACP      Board Certified Hematologist      Board Certified Medical Oncologist            Electronically signed by ERIC PARIS  04/10/2018 15:55  Electronically signed by NICK LEDESMA  06/11/2020 13:32  Electronically signed by ANNE MOODY  04/12/2018 21:40       Disclaimer: Converted document may not contain table formatting or lab diagrams. Please see [email or web address] for the authenticated document.

## 2021-05-28 NOTE — PROGRESS NOTES
Patient: KONG FORBES     Acct: [id number]     Report: #[id number]  UNIT #: [id number]     : 1955    Encounter Date:2018  PRIMARY CARE: VILMA ZIEGLER DO  ***Signed***  --------------------------------------------------------------------------------------------------------------------  Chief Complaint      Apr 3, 2018      BREAST CANCER      Breast Cancer      Intent of Therapy?:  Curative            Current Plan      -Lobular breast cancer      -T2 N1 M0.  Stage IIB      -CT chest, abdomen, and pelvis and bone scan was done.  No metastatic disease.      As an incidental finding of a 1.8 x 2 cm mass in the lower right renal pole.      It is also a 0.35 cm noncalcified nodule.  This is too small to characterize.      Radiologist recommended a CT follow-up in 12 months      -Patient was given a copy of the national cancer center network guidelines.  If     patient remains a locally advanced stage we discussed treatment options     including neoadjuvant versus adjuvant chemotherapy.  After discussion of risk     and benefits we have agreed for neoadjuvant chemotherapy.      -Patient will proceed with neoadjuvant chemotherapy.  Afterwards patient will     have surgery with Dr. Melissa Baig.            -Renal mass      -There is a 1.8 x 2 cm renal mass noted on CT abdomen pelvis on .      -Orders placed to follow-up with Dr. Mcintosh for further evaluation.  Appreciate     recommendations      -Patient will have surgery after chemotherapy.            -Interval Clinic Visit Changes      -Patient received cycle 1 of Cytoxan and Docetaxel            Medical Evaluation of Cancer Associated Symptoms today      -Cancer associated pain      -Patient's pain has remained stable.      -Continue current pain regimen.      -Cancer associated muscle weakness      -Patient's weakness has remained stable.      -Continue close observation.      -Cancer associated fatigue      -Patient's fatigue has  remained stable.      -Recommended exercise.      -Anxiety due to cancer      -Patient's anxiety is well controlled today.       -Continue current management.       -Today's Assessment      -ECOG 1.       -Patient's imaging exams, blood tests, physicians' notes, and any new findings     since our last visit were reviewed today to reassess patient's medical     treatment plan. .       -Old medical records were reviewed and summarized in chronological order in the     HPI today to maintain an updated medical record.       -Patient's radiology imaging tests from our last visit were reviewed     independently by me by direct visualization of the images.        -Patients current lab tests and medications were carefully reviewed to evaluate     patient's current treatment plan today.       -Patient was advised to call us right away if there are any new symptoms for an     urgent visit for further evaluation. Patient voiced understanding and agreed to     do so.      Breast cancer - C50.919            Notes      New Medications      * Acetaminophen* (Tylenol Extra Strength*) 500 MG TABLET: 500 MG PO Q4-6H PRN     PAIN OR FEVER #100      * MOUTHWASH (Magic Mouthwash) 1 EACH BOTTLE: 15 ML PO Q4H PRN SORENESS       Instructions: This compound contains: 50% Viscous Lidocaine 2% 30% Pepto     Bismol 20% Diphenhydramine 25 mg/10 ml      Renewed Medications      * metFORMIN HCl (Glucophage*) 1,000 MG TABLET:       From: 1 TAB PO BID       To: 1 TAB PO BID 30 Days #60      * glipiZIDE (glipiZIDE*) 5 MG TABLET: 15 MG PO BID #90      * Levothyroxine 0.25 MG TABLET: 50 MCG PO QDAY@07 #30      * Lisinopril* 5 MG TABLET: 5 MG PO QDAY #30      * Citalopram HBr (CeleXA) 40 MG TABLET: 40 MG PO QDAY #30      New Diagnostics      * CBC, As Soon As Possible       Dx: Breast cancer - C50.919      * CMP Comp Metabolic Panel, Stat       Dx: Breast cancer - C50.919      * Hemoglobin A1c, As Soon As Possible       Dx: Breast cancer - C50.919       Time Spent:  > 50% /Coord Care      Patient Education Provided:  Yes            History and Present Illness      This is a very pleasant 62-year-old female with newly diagnosed breast cancer     and a kidney mass.            -February 9-2018.  Mammogram shows a suspicious 1.57 m spiculated mass at 10:00     in the right breast.  Irregular abnormal right axillary lymph node.      -February .  Ultrasound core needle biopsy returned as invasive lobular     carcinoma.  Allen histologic score 1.  Size of largest focus 13 mm.      Ancillary studies ER +80%.  NE +70%.  Ki-67 40% HER-2/rhea negative.  Metastatic     lobular carcinoma involves lymph node.      -March 7-2018.  CT chest, abdomen, and pelvis showed a 1.8 cm x 2 sent a meter     heterogeneously enhancing exophytic lower pole of the right kidney.  There is     also a 0.35 cm noncalcified nodule in the left upper lobe.      -March 7-2018.  Bone scan showed no abnormal skeletal uptake.  Multifocal focal     degenerative uptake      -March 8-2018.  There is a 2.6 x 4 x 3.4 cm enhancing mass was spiculated     worsened compatible with no invasive lobular carcinoma.  There are 2 right     axillary lymph nodes are enlarged.      -March .  Cycle 1 day 1 Docetaxel Cytoxan            Vitals      Height 5 ft 2.56 in / 158.9 cm      Weight 211 lbs 6.738 oz / 95.9 kg      BSA 2.11 m2      BMI 38.0 kg/m2      Temperature 97.1 F / 36.17 C - Temporal      Pulse 103      Blood Pressure 122/69 Sitting, Left Arm      Pulse Oximetry 99%, room air            Allergies      Coded Allergies:             ERYTHROMYCIN BASE (Verified  Adverse Reaction, Unknown, Severe Headache, 4/    3/18)           SULFA (SULFONAMIDE ANTIBIOTICS) (Verified  Adverse Reaction, Unknown, 4/3/    18)       it does not work on her            Medications      Last Reconciled on 4/4/18 20:26 by ANNE NARVAEZ MD      Citalopram HBr (CeleXA) 40 Mg Tablet      40 MG PO QDAY, #30 TAB 0  Refills         Prov: Herbert Moody         4/3/18       Lisinopril* (Lisinopril*) 5 Mg Tablet      5 MG PO QDAY, #30 TAB 0 Refills         Prov: Herbert Moody         4/3/18       Levothyroxine (Levothyroxine) 0.25 Mg Tablet      50 MCG PO QDAY@07, #30 TAB 0 Refills         Prov: Herbert Moody         4/3/18       glipiZIDE (glipiZIDE*) 5 Mg Tablet      15 MG PO BID, #90 TAB 0 Refills         Prov: Herbert Moody         4/3/18       metFORMIN HCl (Glucophage*) 1,000 Mg Tablet      1 TAB PO BID for 30 Days, #60 TAB 0 Refills         Prov: Herbert Moody         4/3/18       Mouthwash (Magic Mouthwash) 1 Each Bottle      15 ML PO Q4H Y for SORENESS, ML         Reported         4/3/18       Acetaminophen* (Tylenol Extra Strength*) 500 Mg Tablet      500 MG PO Q4-6H Y for PAIN OR FEVER, #100 TAB 0 Refills         Reported         4/3/18       Miconazole Nitrate (Miconazole 7 Vag Cream) 45 Gm Cream.appl      1 APL VAG HS, #1 TUBE         Prov: ERIC PARIS onc         3/27/18       Cholecalciferol (Vitamin D3*) 2,000 U Tablet      2000 UNITS PO QDAY, #30 TAB 0 Refills         Reported         3/14/18       Biotin (Biotin) 2,500 Mcg Cap      1000 MCG PO QDAY, #30 CAP         Reported         3/14/18       Prochlorperazine (Compazine*) 10 Mg/2 Ml Vial      10 MG IM Q6H Y for NAUSEA AND/OR VOMITING, VIAL         Reported         3/14/18       Dexamethasone (Dexamethasone) 2 Mg Tab      4 MG PO BID, TAB         Reported         3/14/18       Hydrocodone/Acetaminophen 5/325 MG (Norco 5/325 Mg) 1 Tab Tab      2 TAB PO Q4-6H Y for pain, #15 TAB 0 Refills         Prov: Melissa Baig         3/13/18       Ondansetron (Ondansetron ODT*) 8 Mg Tab.rapdis      8 MG PO Q8H Y for NAUSEA, #24 TAB.RAPDIS 3 Refills         Prov: Herbert Moody         3/13/18       diphenhydrAMINE HCl (Benadryl*) 25 Mg Capsule      25 MG PO Q4-6H Y for ALLERGIES, #100 TAB         Reported         3/12/18       Potassium (Potassium) 99 Mg Tablet       3 PO TID, TAB         Reported         3/9/18       Magnesium Amino Acid Chelate (Magnesium*) 100 Mg Tablet      250 MCG PO QDAY, TAB         Reported         3/9/18       Insulin Asp/Prt/Insulin Aspart (NovoLOG Mix 70/30 VIAL*) 100 Units/Ml Vial      20 UNITS SUBQ BID INSULIN, #1 VIAL 0 Refills         Reported         3/30/17       Cetirizine Hcl (ZyrTEC*) 10 Mg Tablet      1 TAB PO QDAY, TAB         Reported         5/6/14            General Information      Provider Not Found in Lookup:  MELISSA ARCE      [facility] Provider 2:  Melissa Baig      [facility] Provider 3:  Pita Mcintosh            Pain and Fatigue Scales      Pain Assessment:           Experiencing any pain?:  Yes         Pain Scale Used:  Numerical         Pain Intensity:  4         Pain Location:  Back         Description:  Aching         Duration:  Continuous      Fatigue:           Experiencing any fatigue?:  Yes         Fatigue (0-10 scale):  6            Past Medical History      No Diabetes Type 1, Yes Diabetes Type 2, No Thyroid Disease, No COPD, No     Emphysema, No Hypertension, No Stroke, No High Cholesterol, No Heart Attack, No     Bleeding Condition, No Low or High RBC Count, No Low or High WBC Count, No Low     or High Platelet Coun, No Hepatitis, No Kidney Disease, No Depression, No     Alzheimer's Disease, No Mental Disease, No Seizures, No Arthritis, No     Osteoporosis, No Osteopenia, No Short of Air, No Sleep apnea, No Liver Disease,     No STD, No Enlarged Prostate, Yes Other (INTERSISTAL CYSTITIS/HIATAL HERNIA)      Hematology/oncology:  DENIES HX OF: Previous Treatment for CA, Anemia, Bladder     Cancer, Blood cancer, Brain cancer, Breast cancer, Cervical cancer, Coagulopathy    , Colorectal cancer, Endocrine cancer, Eye cancer, GI cancer,  cancer, Kidney     cancer, Leukemia, Leukocytosis, Leukopenia, Liver cancer, Lung cancer, Lymphoma    , Musculoskeletal cancer, Myeloma, Neurologic cancer, Oral cancer, Ovarian     cancer, Skin  cancer, Stomach cancer, Thrombocytopenia, Thyroid cancer, Uterine     cancer, Other cancer history, Other hematologic history      Genetic/metabolic:  DENIES HX OF: Cystic fibrosis, Down syndrome, Other genetic     history, Other metabolic history            Past Surgical History      DENIES HX OF: Cataract extraction, Thyroid surgery, Lung biopsy, CABG surgery,     Coronary stent, Valve replacement, Appendectomy, Cholecystectomy, Splenectomy,     Bladder surgery, Nephrectomy, Joint replacement, Frature repair, Skin cancer     removal, Melanoma excision, Spinal surgery, Breast biopsy, Lumpectomy,     Mastectomy, bilateral, Mastectomy, right, Mastectomy, left, Hysterectomy, Peg     Tube Placement, VAD Placement, Biopsy, Other Past Surgical Hx            Family History      REPORTS HX OF: Colorectal cancer (MATERNAL GRANDMOTHER), Leukemia (MOTHER),     DENIES HX OF: Anemia, Blood disorders, Blood Cancer, Breast cancer, Cervical     cancer, Coagulopathy, Endocrine Cancer, Eye Cancer, GI Cancer,  Cancer,     Kidney Cancer, Leukocytosis, Leukopenia, Liver Cancer, Lung cancer, Lymphoma,     Melanoma, Musculoskeletal Cancer, Myeloma, Neurologic Cancer, Oral Cancer,     Ovarian cancer, Prostate cancer, Skin Cancer, Stomach Cancer, Testicular Cancer    , Thrombocytopenia, Thyroid cancer, Uterine cancer, Other Cancer History, Other     Hematology History            Social History      Yes            Tobacco Use      Tobacco status:  Never smoker            Alcohol Use      Alcohol intake:  None            Substance Use      Substance use:  Denies use            ROS      General:  Complains of: Fatigue, Denies: Appetite change, Excessive sweating,     Fever, Night sweats, Weight gain, Weight loss, Other      Eyes:  Denies: Blurred vision, Corrective lenses, Diplopia, Eye irritation, Eye     pain, Eye redness, Spots in vision, Vision loss, Other      Ears, nose, mouth, throat:  Denies: Headache, Seizures, Visual Changes,  Hearing     loss, Sinus Congestion, Hoarseness, Sore throat, Other      Cardiovascular:  Denies: Chest pain, Irregular heartbeat, Palpitations, Swollen     ankles/legs, Other      Respiratory:  Denies: Chest pain, Shortness of Air, Productive cough, Coughing     blood, Other      Gastrointestinal:  Denies: Nausea, Vomiting, Problem swallowing, Frequent     heartburn, Constipation, Diarrhea, Tarry stools, Bloody stools, Unable to     control bowels, Other      Kidney/Bladder:  Complains of: Painful Urination, Frequent Urination, Denies:     Change in urinary stream, Blood in urine, Incontinence, Decreased urine stream,     Other      Musculoskeletal:  Denies: New Back pain, Leg Cramps, Painful Joints, Swollen     Joints, Muscle Pain, Muscle weakness, Other      Skin:  DENIES: Jaundice, Easy Bleeding, Lesions/changes in moles, Nail changes,     Skin Discoloration, Rash, Other      Neurological:  Denies: Dizziness, Fainting, Numbness\Tingling, Paralysis,     Seizures, Other      Psychiatric:  Complains of: AAO X 3, Denies: Anxiety, Panic attacks, Depression    , Memory loss, Other      Endocrine:  DiabetesThyroid DisorderOsteoporosisEndocrine Other      Hematologic/lymphatic:  Denies: Bruising, Bleeding, Enlarged Lymph Nodes,     Recurrent infections, Other      Reproductive:  Denies Pregnant, Denies Menopause, Denies Still Menstruating,     Denies Heavy Periods, Denies Other            Vitals            Vital Signs              Date Time  Temp Pulse Resp B/P (MAP) Pulse Ox O2 Delivery O2 Flow Rate FiO2             3/20/18 09:15 96.8 96 20 142/69 99 ROOM AIR              General Appearance:  Alert, Oriented X3, No acute distress      Eyes:  Anicteric Sclerae, Moist Conjunctiva      HEENT:  Orophraynx clear, No Erythema      Neck:  Supple, Full ROM      Respiratory:  CTAB, No Diminished Breath      Abdomen\Gastro:  Soft, No Masses      Cardio:  RRR, No Murmur, No, Peripheral Edema      Skin:  Normal Temperature, Normal  Tone, No Rash, lesions, ulcers      Psychiatric:  Appropriate Affect, AAO x3      Neuro:  Cranial Nerve II-XII Inta, No Focal Sensory Deficit      Muscularskeletal:  Normal Gait and Station, Full ROM of extremeties      Extremities:  No Digital Cyanosis, No Digital Ischemia      Lymphatic:  No Cervical, No Supraclavicular, No Axillary            Lab Results      Laboratory Tests      3/20/18 09:15            Hx Influenza Vaccination:  No      Influenza Vaccine Declined:  Yes      2 or More Falls Past Year?:  No      Fall Past Year with Injury?:  No      Hx Pneumococcal Vaccination:  No      Encouraged to follow-up with:  PCP regarding preventative exams.      Chart initiated by      KIZZY NORMAN CMA                 Disclaimer: Converted document may not contain table formatting or lab diagrams. Please see [employer] for the authenticated document.

## 2021-05-28 NOTE — PROGRESS NOTES
Patient: KONG PATEL     Acct: [id number]     Report: #[id number]  UNIT #: [id number]     : 1955    Encounter Date:2019  PRIMARY CARE: VILMA ZIEGLER DO  ***Signed***  --------------------------------------------------------------------------------------------------------------------  NURSE INTAKE      Visit Type      Established Patient Visit            Chief Complaint      BREAST CA            Referring Provider/Copies To      Referring Provider:  Pita Mcintosh      Provider Not Found in Lookup:  [facility]      PCP Not Found in Lookup:  [facility]            History and Present Illness      Past Oncology Illness History      Ms. Patel is a very pleasant 62-year-old WF with newly diagnosed breast     cancer and a kidney mass.  .  Mammogram shows a suspicious 1.5 cm    spiculated mass at 10:00 in the right breast.  Irregular abnormal right axillary    lymph node.  .  Ultrasound core needle biopsy returned as     invasive lobular carcinoma.  [place] histologic score 1.  Size of largest     focus 13 mm.  Ancillary studies ER +80%.  [place] +70%.  Ki-67 40% HER-2/rhea     negative.  Metastatic lobular carcinoma involves lymph node.  .  CT     chest, abdomen, and pelvis showed a 1.8 cm x 2 sent a meter heterogeneously enh    ancing exophytic lower pole of the right kidney.  There is also a 0.35 cm     noncalcified nodule in the left upper lobe.  .  Bone scan showed no     abnormal skeletal uptake.  Multifocal focal degenerative uptake. .      There is a 2.6 x 4 x 3.4 cm enhancing mass was spiculated worsened compatible     with no invasive lobular carcinoma.  There are 2 right axillary lymph nodes are     enlarged.  .  Patient had a right sentinel node and right     lumpectomy.  Pathology revealed invasive lobular carcinoma.  Negative margins.      Number of lymph nodes with macro metastases 2.   ER 70%.  OR 80%.  Ki-67 2%.      Tumor size 3 cm. The patient also underwent right robotic partial nephrectomy     per Dr. 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  September 13, 2018. Presents for follow     up for breast cancer. Patient has had 2 radiation consults and has not been to     either consult  secondary to poor transportation to see Dr. Aguiar. Discussed     with patient regarding the importance of possible radiation to the breast.     Breast exam completed and did palpate a lump in the right upper breast about the    10:00 to 12:00 position. Patient reports she has felt an area to same location.     She is looking for new PCP since hers has left [facility].Requests home     meds be filled until she finds new PCP. Did have surgery for right tumor to the     right kidney which she states was benign. Has follow up appointment with Dr. Mcintosh today. November 7-2018 DEXA scan was normal.            HPI - Oncology Interim      Patient returns today for follow-up of her breast cancer.  At her last visit,     she was started on Arimidex 1 mg daily.  She is compliant with her medication.      She denies any problems or side effects from the Arimidex.  The breast has     recovered from surgery and radiation.  She denies any new masses or breast     changes.  She is eating and drinking adequately, her weight is maintained.  Her     energy level is good.  She does report low back pain which comes and goes and     has been present for the last couple of weeks.  She denies any obvious trauma to    the area or over exertion.  The pain is localized to the lumbar spine area and     does not radiate.  There is no neurologic changes.  Tylenol usually relieves her    symptoms            Cancer Details            Right breast invasive lobular carcinoma ER/OR+ HER2- Ki 6740%      Right kidney Oncocytoma            Clinical Staging      Stage IIB ([id number])            Treatments      Chemotherapy      3/20/18 thru 5/2018  completed 4C TC     2/14/19 Arimidex initiated      Radiation Therapy      4/18 thru 11/16/18 received [id number] XRT rt breast/subclavian nodes            Clinical Trial Participant      No            ECOG Performance Status      0            PAST, FAMILY   Past Medical History      Past Medical History:  Alzheimer's Disease, Depression, Diabetes Type 2,     Hypertension, Thyroid Disease      Hematology/Oncology (F):  Anemia, Breast Cancer            Past Surgical History      Lumpectomy            Family History      Family History:  Colorectal Cancer (maternal grandmother, ), Leukemia (mother)            Social History      Marital Status:        Lives independently:  Yes      Number of Children:  2      Occupation:  RETIRED TEACHER            Tobacco Use      Tobacco status:  Never smoker            Alcohol Use      Alcohol intake:  None            Substance Use      Substance use:  Denies use            REVIEW OF SYSTEMS      General:  Denies: Appetite Change, Fatigue, Fever, Night Sweats, Weight Gain,     Weight Loss      Eye:  Denies: Blurred Vision, Corrective Lenses, Diplopia, Eye Irritation, Eye     Pain, Eye Redness, Spots in Vision, Vision Loss      ENT:  Denies: Headache, Hearing Loss, Hoarseness, Seizures, Sinus Congestion,     Sore Throat      Cardiovascular:  Denies: Chest Pain, Edema Ankles, Edema Legs, Irregular     Heartbeat, Palpitations      Respiratory:  Denies: Coughing Blood, Productive Cough, Shortness of Air,     Wheezing      Gastrointestinal:  Denies: Bloody Stools, Constipation, Diarrhea, Frequent     Heartburn, Nausea, Problem Swallowing, Tarry Stools, Unable to Control Bowels,     Vomiting      Genitourinary (female):  Denies: Blood in Urine, Decrease Urine Stream, Frequent    Urination, Incontinence, Painful Urination      Musculoskeletal:  Admits: Back Pain;          Denies: Leg Cramps, Muscle Pain, Muscle Weakness, Painful Joints, Swollen     Joints      Integumentary:   Denies: Bleeds Easily, Bruises Easily, Hair Changes, Jaundice,     Lesions, Mole Changes, Nail Changes, Pigment Changes, Rash, Skin Discoloration      Neurologic:  Denies: Dizziness, Fainting, Numbness\Tingling, Paralysis, Seizures      Psychiatric:  Denies: Anxiety, Confused, Depression, Disoriented, Memory Loss      Endocrine:  Admits: Diabetes;          Denies: Cold Intolerance, Excessive Sweating, Excessive Thirst, Excessive     Urination, Heat Intolerance, Flushing, Hyperthyroidism, Hypothyroidism      Hematologic/Lymphatic:  Denies: Bruising, Bleeding, Enlarged Lymph Nodes,     Recurrent Infections, Transfusions            VITAL SIGNS AND SCORES      Vitals      Height 5 ft 2.56 in / 158.9 cm      Weight 211 lbs 6.738 oz / 95.9 kg      BSA 1.97 m2      BMI 38.0 kg/m2      Temperature 97.6 F / 36.44 C - Temporal      Pulse 98      Blood Pressure 120/60 Sitting, Left Arm      Pulse Oximetry 99%, ROOM AIR            Pain Score      Experiencing any pain?:  Yes      Pain Scale Used:  Numerical      Pain Intensity:  4            Fatigue Score      Experiencing any fatigue?:  Yes      Fatigue (0-10 scale):  4            EXAM      General Appearance:  Positive for: Alert, Oriented x3, Cooperative;          Negative for: Acute Distress      Neck:  Positive for: Supple (Dating some chair);          Negative for: JVD, Masses      Respiratory:  Positive for: CTAB, Normal Respiratory Effort      Breast - Right:  Positive for: Appearance (Well-healed surgical incision on the     breast and axilla);          Negative for: Adenopathy, Discharge, Mass, Skin Changes      Chest:  Port in Place      Abdomen/Gastro:  Positive for: Normal Active Bowel Sounds, Soft;          Negative for: Distention, Hepatosplenomegaly, Tenderness      Cardiovascular:  Positive for: RRR;          Negative for: Gallop (Mr. Plaza is a 6.3/6.20 patient), Murmur, Peripheral     Edema, Rub      Psychiatric:  Positive for: Appropriate Affect, Intact  Judgement      Lymphatic:  Negative for: Axillary, Cervical, Infraclavicular, Supraclavicular            PREVENTION      Influenza Vaccine Declined:  Yes      2 or More Falls Past Year?:  No      Fall Past Year with Injury?:  No      Encouraged to follow-up with:  PCP regarding preventative exams.      Chart initiated by      LARA VALLES MA            ALLERGY/MEDS      Allergies      Coded Allergies:             ERYTHROMYCIN BASE (Verified  Adverse Reaction, Unknown, Severe Headache,     3/12/19)            Medications      Last Reconciled on 3/12/19 15:27 by CESILIA GREWAL      Anastrozole (Anastrozole) 1 Mg Tablet      1 MG PO QDAY, #90 TAB 3 Refills         Prov: CESILIA GREWAL         3/12/19       Aspirin EC (Asprin EC) 325 Mg Tablet.dr      325 MG PO QDAY, #100 TAB 0 Refills         Reported         3/12/19       Levothyroxine (Levothyroxine) 0.25 Mg Tablet      50 MCG PO QDAY@07, #30 TAB 2 Refills         Prov: ERIC PARIS onc         1/2/19       glipiZIDE (glipiZIDE*) 5 Mg Tablet      15 MG PO BID, #180 TAB 2 Refills         Prov: Herbert Moody         11/21/18       Citalopram HBr (CeleXA) 40 Mg Tablet      40 MG PO QDAY, #30 TAB 2 Refills         Prov: Herbert Moody         11/21/18       Lisinopril* (Lisinopril*) 5 Mg Tablet      5 MG PO QDAY, #30 TAB 2 Refills         Prov: Herbert Moody         11/21/18       Metformin HCl (Glucophage) 1,000 Mg Tablet      1 TAB PO BID for 30 Days, #60 TAB 2 Refills         Prov: Herbert Moody         11/21/18       Insulin Asp/Prt/Insulin Aspart (NovoLOG Mix 70/30 FLEXPEN*) 100 Unit/1 Ml     Insuln.pen      25 UNITS SUBQ BID INSULIN for 30 Days, #1 BOX 2 Refills         Prov: ERIC PARIS onc         9/13/18       Acetaminophen Es (Tylenol Extra Strength) 500 Mg Tablet      500 MG PO Q4-6H PRN for PAIN OR FEVER, #100 TAB 0 Refills         Reported         4/3/18       Cholecalciferol (Vitamin D3*) 2,000 U Tablet      2000 UNITS PO QDAY, #30 TAB 0  Refills         Reported         3/14/18       Biotin (Biotin) 2,500 Mcg Cap      1000 MCG PO QDAY, #30 CAP         Reported         3/14/18       diphenhydrAMINE HCl (BENADRYL) 25 Mg Capsule      25 MG PO Q4-6H PRN for ALLERGIES, #100 TAB         Reported         3/12/18       Potassium (Potassium) 99 Mg Tablet      99 MG PO TID, TAB         Reported         3/9/18       Magnesium Amino Acid Chelate (Magnesium*) 100 Mg Tablet      250 MCG PO QDAY, TAB         Reported         3/9/18       Cetirizine Hcl (ZyrTEC) 10 Mg Tablet      10 MG PO QDAY, TAB         Reported         5/6/14      Medications Reviewed:  Changes made to meds            IMPRESSION/PLAN      Impression      Breast cancer            Diagnosis      Breast cancer         Malignant neoplasm of right breast in female, estrogen receptor positive,        unspecified site of breast - C50.911, Z17.0         Breast location: unspecified site of breast         Estrogen receptor status: positive         Patient sex: female         Laterality: right      Patient recently completed radiation and is now on adjuvant Arimidex started     2/19.  Tolerating well.  I see no evidence of disease recurrence by history or     physical examination.  Port flush today.  We discussed low-fat low-cholesterol     diet and routine aerobic exercise.  RTC 3 months for ongoing surveillance.            Low back pain         Acute bilateral low back pain without sciatica - M54.5         Chronicity: acute         Back pain laterality: bilateral         Sciatica presence: without sciatica      Likely musculoskeletal.  We discussed conservative measures including heating     pad, over-the-counter Motrin 3 or 4 times daily for 1-2 weeks.  If no improveme    nt follow-up with PCP            Anemia         Anemia, unspecified type         Anemia type: unspecified type      Repeat lab work today as outlined below.            Notes      New Medications      * Aspirin EC (Asprin EC) 325 MG  TABLET.DR: 325 MG PO QDAY #100      Renewed Medications      * Anastrozole 1 MG TABLET:         From: 1 MG PO QDAY #30         To: 1 MG PO QDAY #90      New Diagnostics      * CBC With Auto Diff, Routine         Dx: Breast cancer - C50.919      * CMP Comp Metabolic Panel, Routine         Dx: Breast cancer - C50.919      * Iron Profile, Routine         Dx: Breast cancer - C50.919      * Ferritin Level, Routine         Dx: Breast cancer - C50.919            Patient Education      Patient Education Provided:  Yes                 Disclaimer: Converted document may not contain table formatting or lab diagrams. Please see [employer] for the authenticated document.

## 2021-05-28 NOTE — PROGRESS NOTES
Patient: KONG FORBES     Acct: [id number]     Report: #[id number]  UNIT #: [id number]     : 1955    Encounter Date:2018  PRIMARY CARE: VILMA ZIEGLER DO  ***Signed***  --------------------------------------------------------------------------------------------------------------------  Chief Complaint      2018      Breast Cancer      Intent of Therapy?:  Curative            Current Plan      -Lobular breast cancer      -T1 N1 M0.  Stage IIA      -Patient will have a CT chest, abdomen, and pelvis to complete staging.  We'll     also get a bone scan.      -Patient was given a copy of the national cancer center network guidelines.  If     patient remains a locally advanced stage we discussed treatment options     including neoadjuvant versus adjuvant chemotherapy.  After discussion of risk     and benefits we have agreed for neoadjuvant chemotherapy.      -Patient will have a port placement with Dr. Melissa Baig.      -Orders will be placed for chemotherapy            Medical Evaluation of Cancer Associated Symptoms today      -Cancer associated pain      -Patient's pain has remained stable.      -Continue current pain regimen.      -Cancer associated muscle weakness      -Patient's weakness has remained stable.      -Continue close observation.      -Cancer associated fatigue      -Patient's fatigue has remained stable.      -Recommended exercise.      -Anxiety due to cancer      -Patient's anxiety is well controlled today.       -Continue current management.       -Today's Assessment      -ECOG 1.       -Patient's imaging exams, blood tests, physicians' notes, and any new findings     since our last visit were reviewed today to reassess patient's medical     treatment plan. .       -Old medical records were reviewed and summarized in chronological order in the     HPI today to maintain an updated medical record.       -Patient's radiology imaging tests from our last visit were  reviewed     independently by me by direct visualization of the images.        -Patients current lab tests and medications were carefully reviewed to evaluate     patient's current treatment plan today.       -Patient was advised to call us right away if there are any new symptoms for an     urgent visit for further evaluation. Patient voiced understanding and agreed to     do so.      Breast cancer - C50.919            Notes      New Diagnostics      * Abd/Pel W/ Cont CT, SCHEDULED PROCEDURE       Dx: Breast cancer - C50.919      * Bone Scan Wh Body NM, SCHEDULED PROCEDURE       Dx: Breast cancer - C50.919      * Chest W/Wo Cont CT, SCHEDULED PROCEDURE       Dx: Breast cancer - C50.919      Time Spent:  > 50% /Coord Care      Patient Education Provided:  Yes            History and Present Illness      This is a very pleasant 62-year-old female with newly diagnosed breast cancer.            -February 9-2018.  Mammogram shows a suspicious 1.57 m spiculated mass at 10:00     in the right breast.  Irregular abnormal right axillary lymph node.      -February .  Ultrasound core needle biopsy returned as invasive lobular     carcinoma.  [place] histologic score 1.  Size of largest focus 13 mm.      Ancillary studies [place] +80%.  [place] +70%.  Ki-67 40% HER-2/rhea negative.  Metastatic     lobular carcinoma involves lymph node.            Allergies      Coded Allergies:             ERYTHROMYCIN BASE (Verified  Adverse Reaction, Unknown, Severe Headache, 2/ 22/18)           SULFA (SULFONAMIDE ANTIBIOTICS) (Verified  Adverse Reaction, Unknown, 2/22/ 18)       it does not work on her            Medications      Last Reconciled on 2/22/18 19:37 by ANNE NARVAEZ MD      Saccharomyces Boulardii (Florastor) 250 Mg Capsule      250 MG PO BID, #14 CAP         Prov: Alok Mahoney         4/1/17       Cephalexin Monohydrate (Cephalexin*) 500 Mg Capsule      500 MG PO TID, #15 CAP 0 Refills         Prov: Alok Mahoney          4/1/17       Doxycycline Hyclate (Doxycycline Hyclate*) 100 Mg Capsule      100 MG PO BID, #10 CAP 0 Refills         Prov: Denzel,Alok         4/1/17       Insulin Asp/Prt/Insulin Aspart (NovoLOG Mix 70/30 VIAL*) 100 Units/Ml Vial      20 UNITS SUBQ BID INSULIN, #1 VIAL 0 Refills         Reported         3/30/17       glipiZIDE (glipiZIDE*) 5 Mg Tablet      15 MG PO BID, #180 TAB 0 Refills         Reported         3/30/17       Ergocalciferol (Ergocalciferol*) 50,000 Unit Capsule      00174 UNITS PO TU@09, #4 CAP 0 Refills         Reported         3/30/17       Citalopram HBr* (Citalopram HBr*) 40 Mg Tablet      40 MG PO QDAY, #30 TAB 0 Refills         Reported         3/30/17       Levothyroxine (Levothyroxine) 0.25 Mg Tablet      0.025 MG PO QDAY@07, #30 TAB 0 Refills         Reported         3/30/17       diphenhydrAMINE HCl (diphenhydrAMINE HCl) 25 Mg Capsule      25 MG PO Q4HR, TAB         Reported         3/30/17       glipiZIDE (glipiZIDE*) 5 Mg Tablet      15 MG PO QDAY, #90 TAB 0 Refills         Reported         3/30/17       Cetirizine Hcl (ZyrTEC*) 10 Mg Tablet      1 TAB PO QDAY, TAB         Reported         5/6/14       Lisinopril* (Lisinopril*) 5 Mg Tablet      1 TAB PO QDAY, TAB         Reported         5/6/14       metFORMIN HCl (Glucophage*) 1,000 Mg Tablet      1 TAB PO BID, TAB 0 Refills         Reported         8/21/09            General Information      Provider Not Found in Lookup:  [facility] ARCE            Past Medical History      Yes Diabetes Type 2, Yes Other (INTERSISTAL CYSTITIS/HIATAL HERNIA)            Family History      REPORTS HX OF: Colorectal cancer (MATERNAL GRANDMOTHER), Leukemia (MOTHER)            Social History      Yes            Tobacco Use      Tobacco status:  Never smoker            Alcohol Use      Alcohol intake:  None            Substance Use      Substance use:  Denies use            ROS      General:  Denies: Appetite change, Excessive sweating, Fatigue,  Fever, Night     sweats, Weight gain, Weight loss, Other      Eyes:  Denies: Blurred vision, Corrective lenses, Diplopia, Eye irritation, Eye     pain, Eye redness, Spots in vision, Vision loss, Other      Ears, nose, mouth, throat:  Denies: Headache, Seizures, Visual Changes, Hearing     loss, Sinus Congestion, Hoarseness, Sore throat, Other      Cardiovascular:  Denies: Chest pain, Irregular heartbeat, Palpitations, Swollen     ankles/legs, Other      Respiratory:  Denies: Chest pain, Shortness of Air, Productive cough, Coughing     blood, Other      Gastrointestinal:  Denies: Nausea, Vomiting, Problem swallowing, Frequent     heartburn, Constipation, Diarrhea, Tarry stools, Bloody stools, Unable to     control bowels, Other      Kidney/Bladder:  Denies: Painful Urination, Change in urinary stream, Blood in     urine, Incontinence, Frequent Urination, Decreased urine stream, Other      Musculoskeletal:  Denies: New Back pain, Leg Cramps, Painful Joints, Swollen     Joints, Muscle Pain, Muscle weakness, Other      Skin:  DENIES: Jaundice, Easy Bleeding, Lesions/changes in moles, Nail changes,     Skin Discoloration, Rash, Other      Neurological:  Denies: Dizziness, Fainting, Numbness\Tingling, Paralysis,     Seizures, Other      Psychiatric:  Complains of: AAO X 3, Denies: Anxiety, Panic attacks, Depression    , Memory loss, Other      Hematologic/lymphatic:  Denies: Bruising, Bleeding, Enlarged Lymph Nodes,     Recurrent infections, Other      Reproductive:  Denies Pregnant, Denies Menopause, Denies Still Menstruating,     Denies Heavy Periods, Denies Other            Vitals            Vital Signs              Date Time  Temp Pulse Resp B/P (MAP) Pulse Ox O2 Delivery O2 Flow Rate FiO2             2/22/18 13:02 97.5 106 20 136/70 100 RM AIR              General Appearance:  Alert, Oriented X3, No acute distress      Eyes:  Anicteric Sclerae, Moist Conjunctiva      HEENT:  Orophraynx clear, No Erythema      Neck:   Supple, Full ROM      Respiratory:  CTAB, No Diminished Breath      Abdomen\Gastro:  Soft, No Masses      Cardio:  RRR, No Murmur, No, Peripheral Edema      Skin:  Normal Temperature, Normal Tone, No Rash, lesions, ulcers      Psychiatric:  Appropriate Affect, AAO x3      Neuro:  Cranial Nerve II-XII Inta, No Focal Sensory Deficit      Muscularskeletal:  Normal Gait and Station, Full ROM of extremeties      Extremities:  No Digital Cyanosis, No Digital Ischemia      Lymphatic:  No Cervical, No Supraclavicular, No Axillary            Hx Influenza Vaccination:  No      Hx Pneumococcal Vaccination:  No      Encouraged to follow-up with:  PCP regarding preventative exams.      Chart initiated by      Total time spent with patient was 60 minutes of which 35 minutes was spent     discussing patient's diagnosis. During this time we had a face-to-face     counseling and coordination of care with the patient. We also discussed in     depth the medical diagnosis and recommended treatment plan.  Patient was seen,     physical exam was performed, lab and imaging tests were noted, and above     medical documentation was also done by me.              Herbert Moody MD FACP      Board Certified Hematologist      Board Certified Medical Oncologist                 Disclaimer: Converted document may not contain table formatting or lab diagrams. Please see [employer] for the authenticated document.

## 2021-05-28 NOTE — PROGRESS NOTES
Patient: KONG PATEL     Acct: [id number]     Report: #[id number]  UNIT #: [id number]     : 1955    Encounter Date:2021  PRIMARY CARE: VILMA ZIEGLER DO  ***Signed***  --------------------------------------------------------------------------------------------------------------------  NURSE INTAKE      Visit Type      Established Patient Visit            Chief Complaint      BREAST CA F/U      Intent of Therapy:  Curative            Referring Provider/Copies To      Copies To:   Pita Mcintosh; Javier Aguiar            History and Present Illness      Past Oncology Illness History      Patient presents to the clinic for follow up of her breast cancer. Ms. Patel     is a very pleasant 62-year-old WF with newly diagnosed breast cancer and a     kidney mass.  .  Ultrasound core needle biopsy returned as in    vasive lobular carcinoma.  [place] histologic score 1.  Size of largest focus    13 mm.  Ancillary studies ER +80%.  HI +70%.  Ki-67 40% HER-2/rhea negative.      Metastatic lobular carcinoma involves lymph node.  .  CT chest,     abdomen, and pelvis showed a 1.8 cm x 2 sent a meter heterogeneously enhancing     exophytic lower pole of the right kidney.  There is also a 0.35 cm noncalcified     nodule in the left upper lobe.  .  There is a 2.6 x 4 x 3.4 cm     enhancing mass was spiculated worsened compatible with no invasive lobular     carcinoma.  There are 2 right axillary lymph nodes are enlarged.   2018. Presents for follow up for breast cancer. Patient has had 2 radiation     consults and has not been to either consult  secondary to poor transportation to    see Dr. Aguiar. Discussed with patient regarding the importance of possible     radiation to the breast. Breast exam completed and did palpate a lump in the r    ight upper breast about the 10:00 to 12:00 position. Patient reports she has     felt an area to same  location. She is looking for new PCP since hers has left     [facility].Requests home meds be filled until she finds new PCP. Did have     surgery for right tumor to the right kidney which she states was benign            HPI - Oncology Interim      Patient returns for follow-up of her breast cancer.  She has been noncompliant     with office visits for port flushes and has not had either for more than a year.     On return today, she states she has still been taking her anastrozole and     denies any problems or side effects from the medication.  She reports some     tenderness in the right breast at the incision.  She reports mammogram scheduled    for the 26th of this month.  She states that she is been having some pain in the    right upper quadrant for the last several weeks.  No obvious inciting events.      She denies nausea or vomiting, diarrhea.  She continues to eat adequately.  She     notes that her energy level is not as good as she would like but adequate for     her ADLs.  No obvious masses or adenopathy.            Cancer Details            Right breast invasive lobular carcinoma ER/SD+ HER2- Ki 6740%      Right kidney Oncocytoma            Clinical Staging      Stage IIB ([id number])            Treatments      Chemotherapy      3/20/18 thru 5/2018 completed 4C TC     2/14/19 Arimidex initiated      Radiation Therapy      4/18 thru 11/16/18 received [id number] XRT rt breast/subclavian nodes      Surgeries       July .  P right sentinel node and right lumpectomy.  Pathology revealed     invasive lobular carcinoma.  Negative margins.  Number of lymph nodes with macro    metastases 2.  ER 70%.  SD 80%.  Ki-67 2%.  Tumor size 3 cm. right robotic     partial nephrectomy per Dr. 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            Clinical Trial Participant      No            ECOG Performance Status      0            Most Recent Lab Findings            Item Value  Date Time             White Blood Count  6.76 10*3/uL 2/4/21 1136             Red Blood Count 4.33 10*6/uL 2/4/21 1136             Hemoglobin 12.4 g/dL 2/4/21 1136             Hematocrit 37.8 % 2/4/21 1136             Mean Corpuscular Volume 87.3 fL 2/4/21 1136             Mean Corpuscular Hemoglobin 28.6 pg 2/4/21 1136             Mean Corpuscular Hemoglobin Concent 32.8 L 2/4/21 1136             Red Cell Distribution Width 17.7 % H 2/4/21 1136             RDW Standard Deviation 55.6 fL 2/4/21 1136             Platelet Count 206 10*3/uL 2/4/21 1136             Mean Platelet Volume 11.8 fL H 2/4/21 1136             Granulocytes (%) 77.0 % 2/4/21 1136             Lymphocytes % 11.1 % L 2/4/21 1136             Monocytes % 10.4 % H 2/4/21 1136             Sodium Level 130 mmol/L L 2/4/21 1125             Potassium Level 4.2 mmol/L 2/4/21 1125             Chloride Level 100 mmol/L 2/4/21 1125             Carbon Dioxide Level 22 mmol/L 2/4/21 1125             Anion Gap 12 mmol/L 2/4/21 1125             Blood Urea Nitrogen 10 mg/dL 2/4/21 1125             Creatinine 0.74 mg/dL 2/4/21 1125             Glomerular Filtration Rate Calc >60 mL/min/{1.73_m2} 2/4/21 1125             BUN/Creatinine Ratio 14 {ratio} 2/4/21 1125             Glucose Level 262 mg/dL H 2/4/21 1125             Calculated Osmolality 278 2/4/21 1125             Calcium Level 10.6 mg/dL H 2/4/21 1125             Total Bilirubin 6.64 mg/dL H 2/4/21 1125             Aspartate Amino Transf (AST/SGOT) 186 U/L H 2/4/21 1125             Alanine Aminotransferase 144 U/L H 2/4/21 1125             Alkaline Phosphatase 602 U/L H 2/4/21 1125             Total Protein 5.9 g/dL L 2/4/21 1125             Albumin 3.3 g/dL L 2/4/21 1125             Globulin 2.6 g/dL 2/4/21 1125             Albumin/Globulin Ratio 1.3 {ratio} L 2/4/21 1125            PAST, FAMILY   Past Medical History      Past Medical History:  Alzheimer's Disease, Depression, Diabetes Type 2,     Hypertension, Thyroid Disease       Hematology/Oncology (F):  Anemia, Breast Cancer            Past Surgical History      Lumpectomy            Family History      Family History:  Colorectal Cancer, Leukemia            Social History      Lives independently:  Yes      Number of Children:  2      Occupation:  RETIRED TEACHER            Tobacco Use      Tobacco status:  Never smoker            Substance Use      Substance use:  Denies use            REVIEW OF SYSTEMS      General:  Admits: Fatigue;          Denies: Weight Loss      Eye:  Admits Corrective Lenses      ENT:  Admits Hearing Loss      Cardiovascular:  Denies Chest Pain      Respiratory:  Admits: Shortness of Air      Gastrointestinal:  Denies Diarrhea      Musculoskeletal:  Admits Muscle Pain      Neurologic:  Denies Numbness\Tingling      Psychiatric:  Admits Depression            VITAL SIGNS AND SCORES      Vitals      Weight 209 lbs 6.992 oz / 95 kg      Temperature 98 F / 36.67 C - Temporal      Pulse 96      Respirations 20      Blood Pressure 151/66 Sitting, Left Arm      Pulse Oximetry 100%, RM AIR            Pain Score      Pain Scale Used:  Numerical      Pain Intensity:  6            Fatigue Score      Fatigue (0-10 scale):  8            EXAM      General Appearance:  Positive for: Alert, Cooperative;          Negative for: Acute Distress      Eye:  Positive for: Anicteric Sclerae, Moist Conjunctiva      Neck:  Positive for: Supple;          Negative for: JVD, Masses      Respiratory:  Positive for: CTAB, Normal Respiratory Effort      Breast - Left:  Positive for: Appearance (Normal-appearing female breast);          Negative for: Adenopathy      Breast - Right:  Positive for: Appearance (Well-healed surgical incision on the     breast and axilla.  There is dense scar tissue underlying the breast incision);          Negative for: Adenopathy      Chest:  Port in Place      Abdomen/Gastro:  Positive for: Normal Active Bowel Sounds, Soft, Tenderness     (Mild right upper  quadrant tenderness without rebound or guarding);          Negative for: Distention, Hepatosplenomegaly      Cardiovascular:  Positive for: RRR;          Negative for: Gallop, Murmur, Peripheral Edema, Rub      Psychiatric:  Positive for: Appropriate Affect, Intact Judgement      Lymphatic:  Negative for: Axillary, Cervical, Infraclavicular, Supraclavicular            PREVENTION      Hx Influenza Vaccination:  No      Influenza Vaccine Declined:  Yes      2 or More Falls in Past Year?:  No      Fall Past Year with Injury?:  No      Encouraged to follow-up with:  PCP regarding preventative exams.      Chart initiated by      SYDNEE CHANDLER MA            ALLERGY/MEDS      Allergies      Coded Allergies:             ALBUTEROL (Verified  Allergy, Unknown, 2/4/21)           AZITHROMYCIN (Verified  Allergy, Unknown, 2/4/21)           ERYTHROMYCIN BASE (Verified  Adverse Reaction, Unknown, Severe Headache,     2/4/21)            Medications      Last Reconciled on 2/4/21 12:42 by CESILIA GREWAL      Anastrozole (Anastrozole) 1 Mg Tablet      1 MG PO QDAY for 90 Days, #90 TAB 1 Refill         Prov: CESILIA GREWAL         2/4/21       Pantoprazole (Protonix) 20 Mg Tablet.dr      40 MG PO QDAY, TAB         Reported         2/4/21       Meloxicam (Meloxicam*) 7.5 Mg Tablet      15 MG PO QDAY, #60 TAB 0 Refills         Reported         2/4/21       Methocarbamol (Methocarbamol*) 500 Mg Tablet      1000 MG PO TID PRN for ABDOMINAL CRAMPING, TAB         Reported         2/4/21       Anastrozole (Anastrozole) Unknown Strength Tablet      PO QDAY, #30 TAB         Reported         8/31/19       Insulin NPH Hum/Reg Insulin Hm (novoLIN 70-30 Vial) 100 Unit/1 Ml Vial      5 UNITS SUBQ BK INSULIN, #1 VIAL         Reported         6/12/19       Levothyroxine (Levothyroxine) 0.25 Mg Tablet      50 MCG PO QDAY@07, #30 TAB 2 Refills         Prov: ERIC PARIS onc         1/2/19       glipiZIDE (glipiZIDE) 5 Mg Tablet      15 MG PO  BID, #180 TAB 2 Refills         Prov: ANNE NARVAEZ         11/21/18       Citalopram HBr (CeleXA) 40 Mg Tablet      40 MG PO QDAY, #30 TAB 2 Refills         Prov: ANNE NARVAEZ         11/21/18       Lisinopril* (Lisinopril*) 5 Mg Tablet      5 MG PO QDAY, #30 TAB 2 Refills         Prov: ANNE NARVAEZ         11/21/18       Metformin HCl (Glucophage) 1,000 Mg Tablet      1 TAB PO BID for 30 Days, #60 TAB 2 Refills         Prov: ANNE NARVAEZ         11/21/18       Cholecalciferol (Vitamin D3) (Vitamin D3) 2,000 U Tablet      2000 UNITS PO QDAY, #30 TAB 0 Refills         Reported         3/14/18       diphenhydrAMINE HCl (BENADRYL) 25 Mg Capsule      25 MG PO Q4-6H PRN for ALLERGIES, #100 TAB         Reported         3/12/18       Potassium Gluconate (Potassium Gluconate) 99 Mg Tablet      99 MG PO TID, TAB         Reported         3/9/18       Magnesium Amino Acid Chelate (Magnesium*) 100 Mg Tablet      250 MCG PO QDAY, TAB         Reported         3/9/18       Cetirizine Hcl (zyrTEC) 10 Mg Tablet      10 MG PO QDAY, TAB         Reported         5/6/14      Medications Reviewed:  Changes made to meds            IMPRESSION/PLAN      Diagnosis      Breast cancer         Malignant neoplasm of right breast in female, estrogen receptor positive,        unspecified site of breast         Breast location: unspecified site of breast         Estrogen receptor status: positive         Patient sex: female         Laterality: right      Patient is status post treatments as outlined.  She is on adjuvant anastrozole.     She has been noncompliant with follow-up for port flushes having been absent for    more than a year.  On examination today, the scar on the right breast does have     some ill-defined density underlying.  This could represent organized scar tissue    or potentially local recurrence.  She does have mammogram already scheduled.      She also complains of right upper quadrant abdominal pain.  She will have lab      work today and CT/bone scans.  Refill anastrozole.  I will see her back in the     near future to review her imaging            Notes      New Medications      * METHOCARBAMOL (Methocarbamol*) 500 MG TABLET: 1,000 MG PO TID PRN ABDOMINAL       CRAMPING      * Meloxicam (Meloxicam*) 7.5 MG TABLET: 15 MG PO QDAY #60      * PANTOPRAZOLE (Protonix) 20 MG TABLET.DR: 40 MG PO QDAY      Renewed Medications      * Anastrozole 1 MG TABLET: 1 MG PO QDAY 90 Days #90      Discontinued Medications      * DM/PSE/BPM 10-30-2 MG/5ML (BROMFED DM COUGH SYRUP) 473 ML SYRUP: 10 ML PO Q4-      6H PRN COUGH AND CONGESTION #118      * CEFDINIR 300 MG CAP: 300 MG PO BID #20      New Diagnostics      * CCC CBC With Auto Diff, 02/04/21         Dx: Breast cancer - C50.919      * CCC Comp Metabolic Panel, 02/04/21         Dx: Breast cancer - C50.919      * CT Abd/Pelvis/Chest W/Contrast, As Soon As Possible         Dx: Breast cancer - C50.919      * Bone Scan Wh Body NM, As Soon As Possible         Dx: Breast cancer - C50.919      New Office Procedures      * Port Flush, 6 WEEKS         Dx: Breast cancer - C50.919            Pain      Pain Zero Today            Advanced Care Plan Discussion      Declines Discussion [id number]            Patient Education            Aerobic Exercise      Patient Education Provided:  Yes            Electronically signed by CESILIA GREWAL  02/04/2021 12:43       Disclaimer: Converted document may not contain table formatting or lab diagrams. Please see [employer] for the authenticated document.